# Patient Record
Sex: MALE | Race: WHITE | NOT HISPANIC OR LATINO | Employment: OTHER | URBAN - METROPOLITAN AREA
[De-identification: names, ages, dates, MRNs, and addresses within clinical notes are randomized per-mention and may not be internally consistent; named-entity substitution may affect disease eponyms.]

---

## 2023-01-01 ENCOUNTER — HOSPITAL ENCOUNTER (OUTPATIENT)
Dept: CARDIOLOGY | Facility: HOSPITAL | Age: 83
Discharge: HOME | End: 2023-12-05

## 2023-01-01 ENCOUNTER — APPOINTMENT (OUTPATIENT)
Dept: RADIOLOGY | Facility: HOSPITAL | Age: 83
DRG: 871 | End: 2023-01-01
Payer: MEDICARE

## 2023-01-01 ENCOUNTER — HOSPITAL ENCOUNTER (INPATIENT)
Facility: HOSPITAL | Age: 83
LOS: 1 days | Discharge: ANOTHER HOSPITAL | DRG: 871 | End: 2023-12-05
Attending: STUDENT IN AN ORGANIZED HEALTH CARE EDUCATION/TRAINING PROGRAM | Admitting: STUDENT IN AN ORGANIZED HEALTH CARE EDUCATION/TRAINING PROGRAM
Payer: MEDICARE

## 2023-01-01 ENCOUNTER — HOSPITAL ENCOUNTER (INPATIENT)
Facility: HOSPITAL | Age: 83
LOS: 7 days | DRG: 871 | End: 2023-12-12
Attending: INTERNAL MEDICINE | Admitting: INTERNAL MEDICINE
Payer: MEDICARE

## 2023-01-01 ENCOUNTER — APPOINTMENT (OUTPATIENT)
Dept: CARDIOLOGY | Facility: HOSPITAL | Age: 83
DRG: 871 | End: 2023-01-01
Payer: MEDICARE

## 2023-01-01 ENCOUNTER — OFFICE VISIT (OUTPATIENT)
Dept: PRIMARY CARE | Facility: CLINIC | Age: 83
End: 2023-01-01

## 2023-01-01 ENCOUNTER — TELEPHONE (OUTPATIENT)
Dept: PRIMARY CARE | Facility: CLINIC | Age: 83
End: 2023-01-01

## 2023-01-01 ENCOUNTER — HOSPITAL ENCOUNTER (OUTPATIENT)
Dept: CARDIOLOGY | Facility: HOSPITAL | Age: 83
Discharge: HOME | End: 2023-12-06

## 2023-01-01 VITALS
HEIGHT: 74 IN | OXYGEN SATURATION: 100 % | RESPIRATION RATE: 20 BRPM | SYSTOLIC BLOOD PRESSURE: 102 MMHG | BODY MASS INDEX: 15.67 KG/M2 | DIASTOLIC BLOOD PRESSURE: 56 MMHG | TEMPERATURE: 97.2 F | HEART RATE: 78 BPM | WEIGHT: 122.14 LBS

## 2023-01-01 VITALS
TEMPERATURE: 97.5 F | DIASTOLIC BLOOD PRESSURE: 56 MMHG | SYSTOLIC BLOOD PRESSURE: 106 MMHG | WEIGHT: 138.45 LBS | BODY MASS INDEX: 17.77 KG/M2 | OXYGEN SATURATION: 91 % | HEIGHT: 74 IN | HEART RATE: 101 BPM | RESPIRATION RATE: 15 BRPM

## 2023-01-01 VITALS
HEIGHT: 72 IN | SYSTOLIC BLOOD PRESSURE: 104 MMHG | OXYGEN SATURATION: 93 % | HEART RATE: 66 BPM | BODY MASS INDEX: 19.26 KG/M2 | TEMPERATURE: 97.7 F | RESPIRATION RATE: 16 BRPM | DIASTOLIC BLOOD PRESSURE: 56 MMHG

## 2023-01-01 DIAGNOSIS — J96.01 ACUTE HYPOXEMIC RESPIRATORY FAILURE (MULTI): Primary | ICD-10-CM

## 2023-01-01 DIAGNOSIS — I95.9 HYPOTENSION, UNSPECIFIED HYPOTENSION TYPE: ICD-10-CM

## 2023-01-01 DIAGNOSIS — R09.02 HYPOXIA: ICD-10-CM

## 2023-01-01 DIAGNOSIS — J43.9 PULMONARY EMPHYSEMA, UNSPECIFIED EMPHYSEMA TYPE (MULTI): Primary | ICD-10-CM

## 2023-01-01 DIAGNOSIS — J18.9 PNEUMONIA OF LEFT LUNG DUE TO INFECTIOUS ORGANISM, UNSPECIFIED PART OF LUNG: Primary | ICD-10-CM

## 2023-01-01 DIAGNOSIS — A41.9 SEPSIS, DUE TO UNSPECIFIED ORGANISM, UNSPECIFIED WHETHER ACUTE ORGAN DYSFUNCTION PRESENT (MULTI): ICD-10-CM

## 2023-01-01 DIAGNOSIS — R63.4 WEIGHT LOSS: ICD-10-CM

## 2023-01-01 DIAGNOSIS — R06.02 SHORTNESS OF BREATH: ICD-10-CM

## 2023-01-01 LAB
ABO GROUP (TYPE) IN BLOOD: NORMAL
ALBUMIN MFR UR ELPH: 20.6 %
ALBUMIN SERPL BCP-MCNC: 2.1 G/DL (ref 3.4–5)
ALBUMIN SERPL BCP-MCNC: 2.1 G/DL (ref 3.4–5)
ALBUMIN SERPL BCP-MCNC: 2.2 G/DL (ref 3.4–5)
ALBUMIN SERPL BCP-MCNC: 2.3 G/DL (ref 3.4–5)
ALBUMIN SERPL BCP-MCNC: 2.3 G/DL (ref 3.4–5)
ALBUMIN SERPL BCP-MCNC: 2.5 G/DL (ref 3.4–5)
ALBUMIN SERPL BCP-MCNC: 3.1 G/DL (ref 3.4–5)
ALBUMIN SERPL BCP-MCNC: 3.2 G/DL (ref 3.4–5)
ALBUMIN SERPL BCP-MCNC: 3.2 G/DL (ref 3.4–5)
ALBUMIN: 2 G/DL (ref 3.4–5)
ALP SERPL-CCNC: 68 U/L (ref 33–136)
ALP SERPL-CCNC: 77 U/L (ref 33–136)
ALP SERPL-CCNC: 80 U/L (ref 33–136)
ALP SERPL-CCNC: 91 U/L (ref 33–136)
ALPHA 1 GLOBULIN: 0.6 G/DL (ref 0.2–0.6)
ALPHA 2 GLOBULIN: 0.8 G/DL (ref 0.4–1.1)
ALPHA1 GLOB MFR UR ELPH: 18.4 %
ALPHA2 GLOB MFR UR ELPH: 10.4 %
ALT SERPL W P-5'-P-CCNC: 6 U/L (ref 10–52)
ALT SERPL W P-5'-P-CCNC: 7 U/L (ref 10–52)
AMMONIA PLAS-SCNC: 78 UMOL/L (ref 16–53)
ANION GAP BLDA CALCULATED.4IONS-SCNC: 10 MMO/L (ref 10–25)
ANION GAP BLDA CALCULATED.4IONS-SCNC: 10 MMO/L (ref 10–25)
ANION GAP BLDA CALCULATED.4IONS-SCNC: 12 MMO/L (ref 10–25)
ANION GAP BLDA CALCULATED.4IONS-SCNC: 5 MMO/L (ref 10–25)
ANION GAP BLDA CALCULATED.4IONS-SCNC: 5 MMO/L (ref 10–25)
ANION GAP BLDA CALCULATED.4IONS-SCNC: 6 MMO/L (ref 10–25)
ANION GAP BLDA CALCULATED.4IONS-SCNC: 6 MMO/L (ref 10–25)
ANION GAP BLDA CALCULATED.4IONS-SCNC: 7 MMO/L (ref 10–25)
ANION GAP BLDA CALCULATED.4IONS-SCNC: 8 MMO/L (ref 10–25)
ANION GAP BLDV CALCULATED.4IONS-SCNC: 10 MMOL/L (ref 10–25)
ANION GAP SERPL CALC-SCNC: 10 MMOL/L (ref 10–20)
ANION GAP SERPL CALC-SCNC: 13 MMOL/L (ref 10–20)
ANION GAP SERPL CALC-SCNC: 13 MMOL/L (ref 10–20)
ANION GAP SERPL CALC-SCNC: 15 MMOL/L (ref 10–20)
ANION GAP SERPL CALC-SCNC: 16 MMOL/L (ref 10–20)
ANTIBODY SCREEN: NORMAL
AORTIC VALVE MEAN GRADIENT: 11
AORTIC VALVE PEAK VELOCITY: 2.48
APPARATUS: ABNORMAL
APPEARANCE UR: ABNORMAL
APTT PPP: 32 SECONDS (ref 27–38)
ARTERIAL PATENCY WRIST A: NEGATIVE
AST SERPL W P-5'-P-CCNC: 13 U/L (ref 9–39)
AST SERPL W P-5'-P-CCNC: 14 U/L (ref 9–39)
AST SERPL W P-5'-P-CCNC: 15 U/L (ref 9–39)
AST SERPL W P-5'-P-CCNC: 17 U/L (ref 9–39)
AV PEAK GRADIENT: 24.6
AVA (PEAK VEL): 1.2
AVA (VTI): 1.33
B-GLOBULIN MFR UR ELPH: 19.2 %
BACTERIA #/AREA URNS AUTO: ABNORMAL /HPF
BACTERIA BLD CULT: NORMAL
BACTERIA BLD CULT: NORMAL
BACTERIA SPEC RESP CULT: ABNORMAL
BACTERIA UR CULT: ABNORMAL
BACTERIA UR CULT: NORMAL
BASE EXCESS BLDA CALC-SCNC: 1.2 MMOL/L (ref -2–3)
BASE EXCESS BLDA CALC-SCNC: 1.8 MMOL/L (ref -2–3)
BASE EXCESS BLDA CALC-SCNC: 1.9 MMOL/L (ref -2–3)
BASE EXCESS BLDA CALC-SCNC: 3 MMOL/L (ref -2–3)
BASE EXCESS BLDA CALC-SCNC: 3.4 MMOL/L (ref -2–3)
BASE EXCESS BLDA CALC-SCNC: 4.3 MMOL/L (ref -2–3)
BASE EXCESS BLDA CALC-SCNC: 4.8 MMOL/L (ref -2–3)
BASE EXCESS BLDA CALC-SCNC: 4.9 MMOL/L (ref -2–3)
BASE EXCESS BLDA CALC-SCNC: 4.9 MMOL/L (ref -2–3)
BASE EXCESS BLDA CALC-SCNC: 5.6 MMOL/L (ref -2–3)
BASE EXCESS BLDA CALC-SCNC: 5.9 MMOL/L (ref -2–3)
BASE EXCESS BLDA CALC-SCNC: 6.3 MMOL/L (ref -2–3)
BASE EXCESS BLDA CALC-SCNC: 8.5 MMOL/L (ref -2–3)
BASE EXCESS BLDA CALC-SCNC: 9.7 MMOL/L (ref -2–3)
BASE EXCESS BLDV CALC-SCNC: 0.8 MMOL/L (ref -2–3)
BASOPHILS # BLD AUTO: 0.01 X10*3/UL (ref 0–0.1)
BASOPHILS # BLD AUTO: 0.02 X10*3/UL (ref 0–0.1)
BASOPHILS # BLD AUTO: 0.03 X10*3/UL (ref 0–0.1)
BASOPHILS # BLD AUTO: 0.04 X10*3/UL (ref 0–0.1)
BASOPHILS # BLD AUTO: 0.11 X10*3/UL (ref 0–0.1)
BASOPHILS NFR BLD AUTO: 0.1 %
BASOPHILS NFR BLD AUTO: 0.3 %
BETA GLOBULIN: 0.7 G/DL (ref 0.5–1.2)
BILIRUB SERPL-MCNC: 0.7 MG/DL (ref 0–1.2)
BILIRUB SERPL-MCNC: 0.8 MG/DL (ref 0–1.2)
BILIRUB SERPL-MCNC: 1.7 MG/DL (ref 0–1.2)
BILIRUB SERPL-MCNC: 1.7 MG/DL (ref 0–1.2)
BILIRUB UR STRIP.AUTO-MCNC: NEGATIVE MG/DL
BNP SERPL-MCNC: 365 PG/ML (ref 0–99)
BODY TEMPERATURE: 37 DEGREES CELSIUS
BODY TEMPERATURE: ABNORMAL
BUN SERPL-MCNC: 27 MG/DL (ref 6–23)
BUN SERPL-MCNC: 28 MG/DL (ref 6–23)
BUN SERPL-MCNC: 29 MG/DL (ref 6–23)
BUN SERPL-MCNC: 29 MG/DL (ref 6–23)
BUN SERPL-MCNC: 34 MG/DL (ref 6–23)
BUN SERPL-MCNC: 34 MG/DL (ref 6–23)
BUN SERPL-MCNC: 35 MG/DL (ref 6–23)
BUN SERPL-MCNC: 37 MG/DL (ref 6–23)
BUN SERPL-MCNC: 37 MG/DL (ref 6–23)
CA-I BLD-SCNC: 1.08 MMOL/L (ref 1.1–1.33)
CA-I BLDA-SCNC: 1.07 MMOL/L (ref 1.1–1.33)
CA-I BLDA-SCNC: 1.07 MMOL/L (ref 1.1–1.33)
CA-I BLDA-SCNC: 1.1 MMOL/L (ref 1.1–1.33)
CA-I BLDA-SCNC: 1.12 MMOL/L (ref 1.1–1.33)
CA-I BLDA-SCNC: 1.12 MMOL/L (ref 1.1–1.33)
CA-I BLDA-SCNC: 1.14 MMOL/L (ref 1.1–1.33)
CA-I BLDA-SCNC: 1.15 MMOL/L (ref 1.1–1.33)
CA-I BLDA-SCNC: 1.16 MMOL/L (ref 1.1–1.33)
CA-I BLDA-SCNC: 1.16 MMOL/L (ref 1.1–1.33)
CA-I BLDA-SCNC: 1.17 MMOL/L (ref 1.1–1.33)
CA-I BLDA-SCNC: 1.2 MMOL/L (ref 1.1–1.33)
CA-I BLDV-SCNC: 1.08 MMOL/L (ref 1.1–1.33)
CALCIUM SERPL-MCNC: 8.3 MG/DL (ref 8.6–10.6)
CALCIUM SERPL-MCNC: 8.3 MG/DL (ref 8.6–10.6)
CALCIUM SERPL-MCNC: 8.4 MG/DL (ref 8.6–10.6)
CALCIUM SERPL-MCNC: 8.5 MG/DL (ref 8.6–10.6)
CALCIUM SERPL-MCNC: 8.8 MG/DL (ref 8.6–10.3)
CALCIUM SERPL-MCNC: 9.2 MG/DL (ref 8.6–10.3)
CALCIUM SERPL-MCNC: 9.2 MG/DL (ref 8.6–10.3)
CARDIAC TROPONIN I PNL SERPL HS: 21 NG/L (ref 0–20)
CARDIAC TROPONIN I PNL SERPL HS: 27 NG/L (ref 0–20)
CARDIAC TROPONIN I PNL SERPL HS: 29 NG/L (ref 0–53)
CARDIAC TROPONIN I PNL SERPL HS: 30 NG/L (ref 0–53)
CARDIAC TROPONIN I PNL SERPL HS: 31 NG/L (ref 0–53)
CARDIAC TROPONIN I PNL SERPL HS: 32 NG/L (ref 0–20)
CHLORIDE BLDA-SCNC: 100 MMOL/L (ref 98–107)
CHLORIDE BLDA-SCNC: 100 MMOL/L (ref 98–107)
CHLORIDE BLDA-SCNC: 101 MMOL/L (ref 98–107)
CHLORIDE BLDA-SCNC: 101 MMOL/L (ref 98–107)
CHLORIDE BLDA-SCNC: 102 MMOL/L (ref 98–107)
CHLORIDE BLDA-SCNC: 107 MMOL/L (ref 98–107)
CHLORIDE BLDA-SCNC: 108 MMOL/L (ref 98–107)
CHLORIDE BLDA-SCNC: 109 MMOL/L (ref 98–107)
CHLORIDE BLDA-SCNC: 113 MMOL/L (ref 98–107)
CHLORIDE BLDA-SCNC: 97 MMOL/L (ref 98–107)
CHLORIDE BLDA-SCNC: 98 MMOL/L (ref 98–107)
CHLORIDE BLDV-SCNC: 107 MMOL/L (ref 98–107)
CHLORIDE SERPL-SCNC: 102 MMOL/L (ref 98–107)
CHLORIDE SERPL-SCNC: 104 MMOL/L (ref 98–107)
CHLORIDE SERPL-SCNC: 105 MMOL/L (ref 98–107)
CHLORIDE SERPL-SCNC: 105 MMOL/L (ref 98–107)
CHLORIDE SERPL-SCNC: 107 MMOL/L (ref 98–107)
CHLORIDE SERPL-SCNC: 95 MMOL/L (ref 98–107)
CHLORIDE SERPL-SCNC: 95 MMOL/L (ref 98–107)
CHLORIDE SERPL-SCNC: 97 MMOL/L (ref 98–107)
CHLORIDE SERPL-SCNC: 99 MMOL/L (ref 98–107)
CK SERPL-CCNC: 64 U/L (ref 0–325)
CO2 SERPL-SCNC: 26 MMOL/L (ref 21–32)
CO2 SERPL-SCNC: 26 MMOL/L (ref 21–32)
CO2 SERPL-SCNC: 28 MMOL/L (ref 21–32)
CO2 SERPL-SCNC: 30 MMOL/L (ref 21–32)
CO2 SERPL-SCNC: 34 MMOL/L (ref 21–32)
CO2 SERPL-SCNC: 35 MMOL/L (ref 21–32)
CO2 SERPL-SCNC: 36 MMOL/L (ref 21–32)
COLOR UR: ABNORMAL
CREAT SERPL-MCNC: 1.08 MG/DL (ref 0.5–1.3)
CREAT SERPL-MCNC: 1.16 MG/DL (ref 0.5–1.3)
CREAT SERPL-MCNC: 1.29 MG/DL (ref 0.5–1.3)
CREAT SERPL-MCNC: 1.4 MG/DL (ref 0.5–1.3)
CREAT SERPL-MCNC: 1.4 MG/DL (ref 0.5–1.3)
CREAT SERPL-MCNC: 1.41 MG/DL (ref 0.5–1.3)
CREAT SERPL-MCNC: 1.42 MG/DL (ref 0.5–1.3)
CREAT SERPL-MCNC: 1.43 MG/DL (ref 0.5–1.3)
CREAT SERPL-MCNC: 1.72 MG/DL (ref 0.5–1.3)
CREAT UR-MCNC: 82.3 MG/DL (ref 20–370)
CRITICAL CALL TIME: 45
CRITICAL CALLED BY: ABNORMAL
CRITICAL CALLED TO: ABNORMAL
CRITICAL READ BACK: ABNORMAL
EJECTION FRACTION APICAL 4 CHAMBER: 63.1
EOSINOPHIL # BLD AUTO: 0 X10*3/UL (ref 0–0.4)
EOSINOPHIL # BLD AUTO: 0.01 X10*3/UL (ref 0–0.4)
EOSINOPHIL NFR BLD AUTO: 0 %
EOSINOPHIL NFR BLD AUTO: 0.1 %
ERYTHROCYTE [DISTWIDTH] IN BLOOD BY AUTOMATED COUNT: 13.6 % (ref 11.5–14.5)
ERYTHROCYTE [DISTWIDTH] IN BLOOD BY AUTOMATED COUNT: 13.8 % (ref 11.5–14.5)
ERYTHROCYTE [DISTWIDTH] IN BLOOD BY AUTOMATED COUNT: 13.9 % (ref 11.5–14.5)
ERYTHROCYTE [DISTWIDTH] IN BLOOD BY AUTOMATED COUNT: 14 % (ref 11.5–14.5)
EST. AVERAGE GLUCOSE BLD GHB EST-MCNC: 126 MG/DL
FLUAV RNA RESP QL NAA+PROBE: NOT DETECTED
FLUBV RNA RESP QL NAA+PROBE: NOT DETECTED
GAMMA GLOB MFR UR ELPH: 31.4 %
GAMMA GLOBULIN: 1.9 G/DL (ref 0.5–1.4)
GFR SERPL CREATININE-BSD FRML MDRD: 39 ML/MIN/1.73M*2
GFR SERPL CREATININE-BSD FRML MDRD: 48 ML/MIN/1.73M*2
GFR SERPL CREATININE-BSD FRML MDRD: 49 ML/MIN/1.73M*2
GFR SERPL CREATININE-BSD FRML MDRD: 49 ML/MIN/1.73M*2
GFR SERPL CREATININE-BSD FRML MDRD: 50 ML/MIN/1.73M*2
GFR SERPL CREATININE-BSD FRML MDRD: 50 ML/MIN/1.73M*2
GFR SERPL CREATININE-BSD FRML MDRD: 55 ML/MIN/1.73M*2
GFR SERPL CREATININE-BSD FRML MDRD: 62 ML/MIN/1.73M*2
GFR SERPL CREATININE-BSD FRML MDRD: 68 ML/MIN/1.73M*2
GLUCOSE BLD MANUAL STRIP-MCNC: 100 MG/DL (ref 74–99)
GLUCOSE BLD MANUAL STRIP-MCNC: 113 MG/DL (ref 74–99)
GLUCOSE BLD MANUAL STRIP-MCNC: 118 MG/DL (ref 74–99)
GLUCOSE BLD MANUAL STRIP-MCNC: 123 MG/DL (ref 74–99)
GLUCOSE BLD MANUAL STRIP-MCNC: 133 MG/DL (ref 74–99)
GLUCOSE BLD MANUAL STRIP-MCNC: 136 MG/DL (ref 74–99)
GLUCOSE BLD MANUAL STRIP-MCNC: 136 MG/DL (ref 74–99)
GLUCOSE BLD MANUAL STRIP-MCNC: 147 MG/DL (ref 74–99)
GLUCOSE BLD MANUAL STRIP-MCNC: 152 MG/DL (ref 74–99)
GLUCOSE BLD MANUAL STRIP-MCNC: 154 MG/DL (ref 74–99)
GLUCOSE BLD MANUAL STRIP-MCNC: 156 MG/DL (ref 74–99)
GLUCOSE BLD MANUAL STRIP-MCNC: 165 MG/DL (ref 74–99)
GLUCOSE BLD MANUAL STRIP-MCNC: 68 MG/DL (ref 74–99)
GLUCOSE BLD MANUAL STRIP-MCNC: 70 MG/DL (ref 74–99)
GLUCOSE BLD MANUAL STRIP-MCNC: 71 MG/DL (ref 74–99)
GLUCOSE BLD MANUAL STRIP-MCNC: 72 MG/DL (ref 74–99)
GLUCOSE BLD MANUAL STRIP-MCNC: 73 MG/DL (ref 74–99)
GLUCOSE BLD MANUAL STRIP-MCNC: 79 MG/DL (ref 74–99)
GLUCOSE BLD MANUAL STRIP-MCNC: 86 MG/DL (ref 74–99)
GLUCOSE BLD MANUAL STRIP-MCNC: 88 MG/DL (ref 74–99)
GLUCOSE BLD MANUAL STRIP-MCNC: 89 MG/DL (ref 74–99)
GLUCOSE BLD MANUAL STRIP-MCNC: 92 MG/DL (ref 74–99)
GLUCOSE BLD MANUAL STRIP-MCNC: 93 MG/DL (ref 74–99)
GLUCOSE BLD MANUAL STRIP-MCNC: 96 MG/DL (ref 74–99)
GLUCOSE BLD MANUAL STRIP-MCNC: 97 MG/DL (ref 74–99)
GLUCOSE BLDA-MCNC: 104 MG/DL (ref 74–99)
GLUCOSE BLDA-MCNC: 113 MG/DL (ref 74–99)
GLUCOSE BLDA-MCNC: 119 MG/DL (ref 74–99)
GLUCOSE BLDA-MCNC: 135 MG/DL (ref 74–99)
GLUCOSE BLDA-MCNC: 144 MG/DL (ref 74–99)
GLUCOSE BLDA-MCNC: 152 MG/DL (ref 74–99)
GLUCOSE BLDA-MCNC: 155 MG/DL (ref 74–99)
GLUCOSE BLDA-MCNC: 174 MG/DL (ref 74–99)
GLUCOSE BLDA-MCNC: 177 MG/DL (ref 74–99)
GLUCOSE BLDA-MCNC: 193 MG/DL (ref 74–99)
GLUCOSE BLDA-MCNC: 88 MG/DL (ref 74–99)
GLUCOSE BLDA-MCNC: 92 MG/DL (ref 74–99)
GLUCOSE BLDA-MCNC: 93 MG/DL (ref 74–99)
GLUCOSE BLDV-MCNC: 148 MG/DL (ref 74–99)
GLUCOSE SERPL-MCNC: 111 MG/DL (ref 74–99)
GLUCOSE SERPL-MCNC: 132 MG/DL (ref 74–99)
GLUCOSE SERPL-MCNC: 141 MG/DL (ref 74–99)
GLUCOSE SERPL-MCNC: 165 MG/DL (ref 74–99)
GLUCOSE SERPL-MCNC: 175 MG/DL (ref 74–99)
GLUCOSE SERPL-MCNC: 67 MG/DL (ref 74–99)
GLUCOSE SERPL-MCNC: 90 MG/DL (ref 74–99)
GLUCOSE SERPL-MCNC: 96 MG/DL (ref 74–99)
GLUCOSE SERPL-MCNC: 96 MG/DL (ref 74–99)
GLUCOSE UR STRIP.AUTO-MCNC: NEGATIVE MG/DL
GRAM STN SPEC: ABNORMAL
GRAM STN SPEC: ABNORMAL
HAPTOGLOB SERPL-MCNC: 265 MG/DL (ref 37–246)
HBA1C MFR BLD: 6 %
HCO3 BLDA-SCNC: 24.7 MMOL/L (ref 22–26)
HCO3 BLDA-SCNC: 26.3 MMOL/L (ref 22–26)
HCO3 BLDA-SCNC: 27.8 MMOL/L (ref 22–26)
HCO3 BLDA-SCNC: 28.2 MMOL/L (ref 22–26)
HCO3 BLDA-SCNC: 28.5 MMOL/L (ref 22–26)
HCO3 BLDA-SCNC: 29.4 MMOL/L (ref 22–26)
HCO3 BLDA-SCNC: 29.7 MMOL/L (ref 22–26)
HCO3 BLDA-SCNC: 29.7 MMOL/L (ref 22–26)
HCO3 BLDA-SCNC: 29.8 MMOL/L (ref 22–26)
HCO3 BLDA-SCNC: 31.6 MMOL/L (ref 22–26)
HCO3 BLDA-SCNC: 31.6 MMOL/L (ref 22–26)
HCO3 BLDA-SCNC: 31.8 MMOL/L (ref 22–26)
HCO3 BLDA-SCNC: 37.9 MMOL/L (ref 22–26)
HCO3 BLDA-SCNC: 38.4 MMOL/L (ref 22–26)
HCO3 BLDV-SCNC: 25.3 MMOL/L (ref 22–26)
HCT VFR BLD AUTO: 29.7 % (ref 41–52)
HCT VFR BLD AUTO: 30.4 % (ref 41–52)
HCT VFR BLD AUTO: 30.4 % (ref 41–52)
HCT VFR BLD AUTO: 32.3 % (ref 41–52)
HCT VFR BLD AUTO: 32.3 % (ref 41–52)
HCT VFR BLD AUTO: 35 % (ref 41–52)
HCT VFR BLD AUTO: 44.6 % (ref 41–52)
HCT VFR BLD AUTO: 45.6 % (ref 41–52)
HCT VFR BLD EST: 28 % (ref 41–52)
HCT VFR BLD EST: 32 % (ref 41–52)
HCT VFR BLD EST: 33 % (ref 41–52)
HCT VFR BLD EST: 34 % (ref 41–52)
HCT VFR BLD EST: 35 % (ref 41–52)
HCT VFR BLD EST: 36 % (ref 41–52)
HCT VFR BLD EST: 36 % (ref 41–52)
HCT VFR BLD EST: 41 % (ref 41–52)
HCT VFR BLD EST: 41 % (ref 41–52)
HGB BLD-MCNC: 10.3 G/DL (ref 13.5–17.5)
HGB BLD-MCNC: 10.5 G/DL (ref 13.5–17.5)
HGB BLD-MCNC: 11.1 G/DL (ref 13.5–17.5)
HGB BLD-MCNC: 13.7 G/DL (ref 13.5–17.5)
HGB BLD-MCNC: 14.3 G/DL (ref 13.5–17.5)
HGB BLD-MCNC: 9.5 G/DL (ref 13.5–17.5)
HGB BLDA-MCNC: 10.5 G/DL (ref 13.5–17.5)
HGB BLDA-MCNC: 10.8 G/DL (ref 13.5–17.5)
HGB BLDA-MCNC: 10.8 G/DL (ref 13.5–17.5)
HGB BLDA-MCNC: 11.1 G/DL (ref 13.5–17.5)
HGB BLDA-MCNC: 11.3 G/DL (ref 13.5–17.5)
HGB BLDA-MCNC: 11.5 G/DL (ref 13.5–17.5)
HGB BLDA-MCNC: 11.6 G/DL (ref 13.5–17.5)
HGB BLDA-MCNC: 11.7 G/DL (ref 13.5–17.5)
HGB BLDA-MCNC: 11.7 G/DL (ref 13.5–17.5)
HGB BLDA-MCNC: 12.1 G/DL (ref 13.5–17.5)
HGB BLDA-MCNC: 13.6 G/DL (ref 13.5–17.5)
HGB BLDA-MCNC: 13.8 G/DL (ref 13.5–17.5)
HGB BLDA-MCNC: 9.3 G/DL (ref 13.5–17.5)
HGB BLDV-MCNC: 12 G/DL (ref 13.5–17.5)
HGB RETIC QN: 32 PG (ref 28–38)
HOLD SPECIMEN: NORMAL
IMM GRANULOCYTES # BLD AUTO: 0.09 X10*3/UL (ref 0–0.5)
IMM GRANULOCYTES # BLD AUTO: 0.1 X10*3/UL (ref 0–0.5)
IMM GRANULOCYTES # BLD AUTO: 0.12 X10*3/UL (ref 0–0.5)
IMM GRANULOCYTES # BLD AUTO: 0.18 X10*3/UL (ref 0–0.5)
IMM GRANULOCYTES # BLD AUTO: 0.19 X10*3/UL (ref 0–0.5)
IMM GRANULOCYTES # BLD AUTO: 0.27 X10*3/UL (ref 0–0.5)
IMM GRANULOCYTES # BLD AUTO: 0.44 X10*3/UL (ref 0–0.5)
IMM GRANULOCYTES NFR BLD AUTO: 0.6 % (ref 0–0.9)
IMM GRANULOCYTES NFR BLD AUTO: 0.7 % (ref 0–0.9)
IMM GRANULOCYTES NFR BLD AUTO: 0.9 % (ref 0–0.9)
IMM GRANULOCYTES NFR BLD AUTO: 0.9 % (ref 0–0.9)
IMM GRANULOCYTES NFR BLD AUTO: 1.1 % (ref 0–0.9)
IMMATURE RETIC FRACTION: 11.3 %
IMMUNOFIXATION COMMENT: ABNORMAL
IMMUNOFIXATION COMMENT: NORMAL
INHALED O2 CONCENTRATION: 100 %
INHALED O2 CONCENTRATION: 15 %
INHALED O2 CONCENTRATION: 30 %
INHALED O2 CONCENTRATION: 40 %
INHALED O2 CONCENTRATION: 50 %
INHALED O2 CONCENTRATION: 90 %
INR PPP: 1.8 (ref 0.9–1.1)
KETONES UR STRIP.AUTO-MCNC: ABNORMAL MG/DL
LACTATE BLDA-SCNC: 0.9 MMOL/L (ref 0.4–2)
LACTATE BLDA-SCNC: 0.9 MMOL/L (ref 0.4–2)
LACTATE BLDA-SCNC: 1 MMOL/L (ref 0.4–2)
LACTATE BLDA-SCNC: 1.2 MMOL/L (ref 0.4–2)
LACTATE BLDA-SCNC: 1.4 MMOL/L (ref 0.4–2)
LACTATE BLDA-SCNC: 1.6 MMOL/L (ref 0.4–2)
LACTATE BLDA-SCNC: 1.6 MMOL/L (ref 0.4–2)
LACTATE BLDA-SCNC: 1.7 MMOL/L (ref 0.4–2)
LACTATE BLDA-SCNC: 1.9 MMOL/L (ref 0.4–2)
LACTATE BLDA-SCNC: 2 MMOL/L (ref 0.4–2)
LACTATE BLDA-SCNC: 2.1 MMOL/L (ref 0.4–2)
LACTATE BLDA-SCNC: 2.3 MMOL/L (ref 0.4–2)
LACTATE BLDA-SCNC: 2.4 MMOL/L (ref 0.4–2)
LACTATE BLDV-SCNC: 1.1 MMOL/L (ref 0.4–2)
LACTATE BLDV-SCNC: 2.1 MMOL/L (ref 0.4–2)
LACTATE SERPL-SCNC: 1.6 MMOL/L (ref 0.4–2)
LACTATE SERPL-SCNC: 1.6 MMOL/L (ref 0.4–2)
LACTATE SERPL-SCNC: 1.9 MMOL/L (ref 0.4–2)
LACTATE SERPL-SCNC: 2.1 MMOL/L (ref 0.4–2)
LACTATE SERPL-SCNC: 2.1 MMOL/L (ref 0.4–2)
LACTATE SERPL-SCNC: 2.7 MMOL/L (ref 0.4–2)
LDH SERPL L TO P-CCNC: 249 U/L (ref 84–246)
LEFT VENTRICLE INTERNAL DIMENSION DIASTOLE: 3.09 (ref 3.5–6)
LEFT VENTRICULAR OUTFLOW TRACT DIAMETER: 2
LEGIONELLA AG UR QL: NEGATIVE
LEUKOCYTE ESTERASE UR QL STRIP.AUTO: ABNORMAL
LYMPHOCYTES # BLD AUTO: 0.41 X10*3/UL (ref 0.8–3)
LYMPHOCYTES # BLD AUTO: 0.57 X10*3/UL (ref 0.8–3)
LYMPHOCYTES # BLD AUTO: 0.59 X10*3/UL (ref 0.8–3)
LYMPHOCYTES # BLD AUTO: 0.75 X10*3/UL (ref 0.8–3)
LYMPHOCYTES # BLD AUTO: 0.85 X10*3/UL (ref 0.8–3)
LYMPHOCYTES # BLD AUTO: 0.88 X10*3/UL (ref 0.8–3)
LYMPHOCYTES # BLD AUTO: 0.95 X10*3/UL (ref 0.8–3)
LYMPHOCYTES NFR BLD AUTO: 1.7 %
LYMPHOCYTES NFR BLD AUTO: 2.2 %
LYMPHOCYTES NFR BLD AUTO: 3.2 %
LYMPHOCYTES NFR BLD AUTO: 3.5 %
LYMPHOCYTES NFR BLD AUTO: 3.5 %
LYMPHOCYTES NFR BLD AUTO: 3.7 %
LYMPHOCYTES NFR BLD AUTO: 6.7 %
M-PROTEIN 1 URINE %: 9 %
M-PROTEIN 1: 0.3 G/DL
MAGNESIUM SERPL-MCNC: 1.76 MG/DL (ref 1.6–2.4)
MAGNESIUM SERPL-MCNC: 1.84 MG/DL (ref 1.6–2.4)
MAGNESIUM SERPL-MCNC: 2.1 MG/DL (ref 1.6–2.4)
MAGNESIUM SERPL-MCNC: 2.14 MG/DL (ref 1.6–2.4)
MAGNESIUM SERPL-MCNC: 2.23 MG/DL (ref 1.6–2.4)
MAGNESIUM SERPL-MCNC: 2.32 MG/DL (ref 1.6–2.4)
MCH RBC QN AUTO: 29 PG (ref 26–34)
MCH RBC QN AUTO: 29.7 PG (ref 26–34)
MCH RBC QN AUTO: 30 PG (ref 26–34)
MCH RBC QN AUTO: 30.1 PG (ref 26–34)
MCH RBC QN AUTO: 30.1 PG (ref 26–34)
MCH RBC QN AUTO: 30.3 PG (ref 26–34)
MCH RBC QN AUTO: 30.7 PG (ref 26–34)
MCH RBC QN AUTO: 30.8 PG (ref 26–34)
MCHC RBC AUTO-ENTMCNC: 30 G/DL (ref 32–36)
MCHC RBC AUTO-ENTMCNC: 30 G/DL (ref 32–36)
MCHC RBC AUTO-ENTMCNC: 31.3 G/DL (ref 32–36)
MCHC RBC AUTO-ENTMCNC: 31.3 G/DL (ref 32–36)
MCHC RBC AUTO-ENTMCNC: 31.9 G/DL (ref 32–36)
MCHC RBC AUTO-ENTMCNC: 32 G/DL (ref 32–36)
MCHC RBC AUTO-ENTMCNC: 32.1 G/DL (ref 32–36)
MCHC RBC AUTO-ENTMCNC: 34.4 G/DL (ref 32–36)
MCV RBC AUTO: 101 FL (ref 80–100)
MCV RBC AUTO: 90 FL (ref 80–100)
MCV RBC AUTO: 93 FL (ref 80–100)
MCV RBC AUTO: 94 FL (ref 80–100)
MCV RBC AUTO: 96 FL (ref 80–100)
MCV RBC AUTO: 97 FL (ref 80–100)
MITRAL VALVE E/A RATIO: 1.12
MITRAL VALVE E/E' RATIO: 17.5
MONOCYTES # BLD AUTO: 0.79 X10*3/UL (ref 0.05–0.8)
MONOCYTES # BLD AUTO: 0.83 X10*3/UL (ref 0.05–0.8)
MONOCYTES # BLD AUTO: 0.85 X10*3/UL (ref 0.05–0.8)
MONOCYTES # BLD AUTO: 0.89 X10*3/UL (ref 0.05–0.8)
MONOCYTES # BLD AUTO: 1.15 X10*3/UL (ref 0.05–0.8)
MONOCYTES # BLD AUTO: 1.73 X10*3/UL (ref 0.05–0.8)
MONOCYTES # BLD AUTO: 2.15 X10*3/UL (ref 0.05–0.8)
MONOCYTES NFR BLD AUTO: 3.4 %
MONOCYTES NFR BLD AUTO: 3.7 %
MONOCYTES NFR BLD AUTO: 5 %
MONOCYTES NFR BLD AUTO: 5.5 %
MONOCYTES NFR BLD AUTO: 5.7 %
MONOCYTES NFR BLD AUTO: 5.9 %
MONOCYTES NFR BLD AUTO: 8.7 %
MRSA DNA SPEC QL NAA+PROBE: NOT DETECTED
MUCOUS THREADS #/AREA URNS AUTO: ABNORMAL /LPF
NEUTROPHILS # BLD AUTO: 11.09 X10*3/UL (ref 1.6–5.5)
NEUTROPHILS # BLD AUTO: 13.97 X10*3/UL (ref 1.6–5.5)
NEUTROPHILS # BLD AUTO: 15.08 X10*3/UL (ref 1.6–5.5)
NEUTROPHILS # BLD AUTO: 19.38 X10*3/UL (ref 1.6–5.5)
NEUTROPHILS # BLD AUTO: 23.2 X10*3/UL (ref 1.6–5.5)
NEUTROPHILS # BLD AUTO: 26.32 X10*3/UL (ref 1.6–5.5)
NEUTROPHILS # BLD AUTO: 35.36 X10*3/UL (ref 1.6–5.5)
NEUTROPHILS NFR BLD AUTO: 83.8 %
NEUTROPHILS NFR BLD AUTO: 89.8 %
NEUTROPHILS NFR BLD AUTO: 89.9 %
NEUTROPHILS NFR BLD AUTO: 90.7 %
NEUTROPHILS NFR BLD AUTO: 90.9 %
NEUTROPHILS NFR BLD AUTO: 91.8 %
NEUTROPHILS NFR BLD AUTO: 94.1 %
NITRITE UR QL STRIP.AUTO: NEGATIVE
NRBC BLD-RTO: 0 /100 WBCS (ref 0–0)
OXYHGB MFR BLDA: 83.1 % (ref 94–98)
OXYHGB MFR BLDA: 88.4 % (ref 94–98)
OXYHGB MFR BLDA: 95.9 % (ref 94–98)
OXYHGB MFR BLDA: 96.2 % (ref 94–98)
OXYHGB MFR BLDA: 96.5 % (ref 94–98)
OXYHGB MFR BLDA: 97.1 % (ref 94–98)
OXYHGB MFR BLDA: 97.4 % (ref 94–98)
OXYHGB MFR BLDA: 97.5 % (ref 94–98)
OXYHGB MFR BLDA: 97.7 % (ref 94–98)
OXYHGB MFR BLDA: 97.9 % (ref 94–98)
OXYHGB MFR BLDA: 98 % (ref 94–98)
OXYHGB MFR BLDA: 98 % (ref 94–98)
OXYHGB MFR BLDA: 98.2 % (ref 94–98)
OXYHGB MFR BLDA: 98.5 % (ref 94–98)
OXYHGB MFR BLDV: 96.6 % (ref 45–75)
PATH REVIEW - SERUM IMMUNOFIXATION: ABNORMAL
PATH REVIEW - URINE IMMUNOFIXATION: NORMAL
PATH REVIEW-SERUM PROTEIN ELECTROPHORESIS: ABNORMAL
PATH REVIEW-URINE PROTEIN ELECTROPHORESIS: ABNORMAL
PCO2 BLDA: 118 MM HG (ref 38–42)
PCO2 BLDA: 31 MM HG (ref 38–42)
PCO2 BLDA: 33 MM HG (ref 38–42)
PCO2 BLDA: 34 MM HG (ref 38–42)
PCO2 BLDA: 36 MM HG (ref 38–42)
PCO2 BLDA: 38 MM HG (ref 38–42)
PCO2 BLDA: 39 MM HG (ref 38–42)
PCO2 BLDA: 47 MM HG (ref 38–42)
PCO2 BLDA: 51 MM HG (ref 38–42)
PCO2 BLDA: 51 MM HG (ref 38–42)
PCO2 BLDA: 55 MM HG (ref 38–42)
PCO2 BLDA: 56 MM HG (ref 38–42)
PCO2 BLDA: 58 MM HG (ref 38–42)
PCO2 BLDA: 67 MM HG (ref 38–42)
PCO2 BLDV: 39 MM HG (ref 41–51)
PEEP CMH2O: 7 CM H2O
PH BLDA: 7.12 PH (ref 7.38–7.42)
PH BLDA: 7.3 PH (ref 7.38–7.42)
PH BLDA: 7.34 PH (ref 7.38–7.42)
PH BLDA: 7.35 PH (ref 7.38–7.42)
PH BLDA: 7.36 PH (ref 7.38–7.42)
PH BLDA: 7.36 PH (ref 7.38–7.42)
PH BLDA: 7.4 PH (ref 7.38–7.42)
PH BLDA: 7.41 PH (ref 7.38–7.42)
PH BLDA: 7.49 PH (ref 7.38–7.42)
PH BLDA: 7.51 PH (ref 7.38–7.42)
PH BLDA: 7.51 PH (ref 7.38–7.42)
PH BLDA: 7.52 PH (ref 7.38–7.42)
PH BLDA: 7.52 PH (ref 7.38–7.42)
PH BLDA: 7.53 PH (ref 7.38–7.42)
PH BLDV: 7.42 PH (ref 7.33–7.43)
PH UR STRIP.AUTO: 5 [PH]
PHOSPHATE SERPL-MCNC: 2.3 MG/DL (ref 2.5–4.9)
PHOSPHATE SERPL-MCNC: 3.2 MG/DL (ref 2.5–4.9)
PHOSPHATE SERPL-MCNC: 3.3 MG/DL (ref 2.5–4.9)
PHOSPHATE SERPL-MCNC: 3.4 MG/DL (ref 2.5–4.9)
PHOSPHATE SERPL-MCNC: 3.5 MG/DL (ref 2.5–4.9)
PHOSPHATE SERPL-MCNC: 8.3 MG/DL (ref 2.5–4.9)
PLATELET # BLD AUTO: 176 X10*3/UL (ref 150–450)
PLATELET # BLD AUTO: 180 X10*3/UL (ref 150–450)
PLATELET # BLD AUTO: 180 X10*3/UL (ref 150–450)
PLATELET # BLD AUTO: 208 X10*3/UL (ref 150–450)
PLATELET # BLD AUTO: 242 X10*3/UL (ref 150–450)
PLATELET # BLD AUTO: 246 X10*3/UL (ref 150–450)
PLATELET # BLD AUTO: 283 X10*3/UL (ref 150–450)
PLATELET # BLD AUTO: 292 X10*3/UL (ref 150–450)
PO2 BLDA: 103 MM HG (ref 85–95)
PO2 BLDA: 115 MM HG (ref 85–95)
PO2 BLDA: 118 MM HG (ref 85–95)
PO2 BLDA: 122 MM HG (ref 85–95)
PO2 BLDA: 143 MM HG (ref 85–95)
PO2 BLDA: 146 MM HG (ref 85–95)
PO2 BLDA: 163 MM HG (ref 85–95)
PO2 BLDA: 185 MM HG (ref 85–95)
PO2 BLDA: 303 MM HG (ref 85–95)
PO2 BLDA: 387 MM HG (ref 85–95)
PO2 BLDA: 60 MM HG (ref 85–95)
PO2 BLDA: 65 MM HG (ref 85–95)
PO2 BLDA: 92 MM HG (ref 85–95)
PO2 BLDA: 93 MM HG (ref 85–95)
PO2 BLDV: 94 MM HG (ref 35–45)
POTASSIUM BLDA-SCNC: 2.9 MMOL/L (ref 3.5–5.3)
POTASSIUM BLDA-SCNC: 3 MMOL/L (ref 3.5–5.3)
POTASSIUM BLDA-SCNC: 3.1 MMOL/L (ref 3.5–5.3)
POTASSIUM BLDA-SCNC: 3.4 MMOL/L (ref 3.5–5.3)
POTASSIUM BLDA-SCNC: 3.4 MMOL/L (ref 3.5–5.3)
POTASSIUM BLDA-SCNC: 3.5 MMOL/L (ref 3.5–5.3)
POTASSIUM BLDA-SCNC: 3.5 MMOL/L (ref 3.5–5.3)
POTASSIUM BLDA-SCNC: 3.6 MMOL/L (ref 3.5–5.3)
POTASSIUM BLDA-SCNC: 3.7 MMOL/L (ref 3.5–5.3)
POTASSIUM BLDA-SCNC: 3.9 MMOL/L (ref 3.5–5.3)
POTASSIUM BLDA-SCNC: 4 MMOL/L (ref 3.5–5.3)
POTASSIUM BLDA-SCNC: 4.2 MMOL/L (ref 3.5–5.3)
POTASSIUM BLDA-SCNC: 4.6 MMOL/L (ref 3.5–5.3)
POTASSIUM BLDV-SCNC: 3.3 MMOL/L (ref 3.5–5.3)
POTASSIUM SERPL-SCNC: 2.9 MMOL/L (ref 3.5–5.3)
POTASSIUM SERPL-SCNC: 3.2 MMOL/L (ref 3.5–5.3)
POTASSIUM SERPL-SCNC: 3.3 MMOL/L (ref 3.5–5.3)
POTASSIUM SERPL-SCNC: 3.5 MMOL/L (ref 3.5–5.3)
POTASSIUM SERPL-SCNC: 3.9 MMOL/L (ref 3.5–5.3)
POTASSIUM SERPL-SCNC: 4.1 MMOL/L (ref 3.5–5.3)
POTASSIUM SERPL-SCNC: 4.5 MMOL/L (ref 3.5–5.3)
PROCALCITONIN SERPL-MCNC: 0.72 NG/ML
PROCALCITONIN SERPL-MCNC: 0.79 NG/ML
PROT SERPL-MCNC: 6 G/DL (ref 6.4–8.2)
PROT SERPL-MCNC: 6.8 G/DL (ref 6.4–8.2)
PROT SERPL-MCNC: 8 G/DL (ref 6.4–8.2)
PROT SERPL-MCNC: 8.4 G/DL (ref 6.4–8.2)
PROT SERPL-MCNC: 8.5 G/DL (ref 6.4–8.2)
PROT UR STRIP.AUTO-MCNC: ABNORMAL MG/DL
PROT UR-ACNC: 156 MG/DL (ref 5–25)
PROT UR-ACNC: 156 MG/DL (ref 5–25)
PROT/CREAT UR: 1.9 MG/MG CREAT (ref 0–0.17)
PROTEIN ELECTROPHORESIS COMMENT: ABNORMAL
PROTHROMBIN TIME: 20.8 SECONDS (ref 9.8–12.8)
RBC # BLD AUTO: 3.16 X10*6/UL (ref 4.5–5.9)
RBC # BLD AUTO: 3.16 X10*6/UL (ref 4.5–5.9)
RBC # BLD AUTO: 3.2 X10*6/UL (ref 4.5–5.9)
RBC # BLD AUTO: 3.43 X10*6/UL (ref 4.5–5.9)
RBC # BLD AUTO: 3.6 X10*6/UL (ref 4.5–5.9)
RBC # BLD AUTO: 3.62 X10*6/UL (ref 4.5–5.9)
RBC # BLD AUTO: 4.52 X10*6/UL (ref 4.5–5.9)
RBC # BLD AUTO: 4.66 X10*6/UL (ref 4.5–5.9)
RBC # UR STRIP.AUTO: ABNORMAL /UL
RBC #/AREA URNS AUTO: ABNORMAL /HPF
RETICS #: 0.04 X10*6/UL (ref 0.02–0.11)
RETICS/RBC NFR AUTO: 1.1 % (ref 0.5–2)
RH FACTOR (ANTIGEN D): NORMAL
RIGHT VENTRICLE FREE WALL PEAK S': 10.3
RIGHT VENTRICLE PEAK SYSTOLIC PRESSURE: 40.5
S PNEUM AG UR QL: NEGATIVE
SAO2 % BLDA: 100 % (ref 94–100)
SAO2 % BLDA: 85 % (ref 94–100)
SAO2 % BLDA: 90 % (ref 94–100)
SAO2 % BLDA: 98 % (ref 94–100)
SAO2 % BLDA: 98 % (ref 94–100)
SAO2 % BLDA: 99 % (ref 94–100)
SAO2 % BLDV: 99 % (ref 45–75)
SARS-COV-2 RNA RESP QL NAA+PROBE: NOT DETECTED
SODIUM BLDA-SCNC: 135 MMOL/L (ref 136–145)
SODIUM BLDA-SCNC: 136 MMOL/L (ref 136–145)
SODIUM BLDA-SCNC: 137 MMOL/L (ref 136–145)
SODIUM BLDA-SCNC: 138 MMOL/L (ref 136–145)
SODIUM BLDA-SCNC: 140 MMOL/L (ref 136–145)
SODIUM BLDV-SCNC: 139 MMOL/L (ref 136–145)
SODIUM SERPL-SCNC: 140 MMOL/L (ref 136–145)
SODIUM SERPL-SCNC: 140 MMOL/L (ref 136–145)
SODIUM SERPL-SCNC: 141 MMOL/L (ref 136–145)
SODIUM SERPL-SCNC: 141 MMOL/L (ref 136–145)
SODIUM SERPL-SCNC: 142 MMOL/L (ref 136–145)
SODIUM SERPL-SCNC: 142 MMOL/L (ref 136–145)
SODIUM SERPL-SCNC: 143 MMOL/L (ref 136–145)
SODIUM SERPL-SCNC: 143 MMOL/L (ref 136–145)
SODIUM SERPL-SCNC: 145 MMOL/L (ref 136–145)
SP GR UR STRIP.AUTO: 1.02
SPECIMEN DRAWN FROM PATIENT: ABNORMAL
SQUAMOUS #/AREA URNS AUTO: ABNORMAL /HPF
STAPHYLOCOCCUS SPEC CULT: NORMAL
STAPHYLOCOCCUS SPEC CULT: NORMAL
TIDAL VOLUME: 450 ML
TSH SERPL-ACNC: 1.59 MIU/L (ref 0.44–3.98)
URINE ELECTROPHORESIS COMMENT: ABNORMAL
UROBILINOGEN UR STRIP.AUTO-MCNC: <2 MG/DL
VANCOMYCIN SERPL-MCNC: 10.6 UG/ML (ref 5–20)
VANCOMYCIN SERPL-MCNC: 11.5 UG/ML (ref 5–20)
VANCOMYCIN SERPL-MCNC: 12.1 UG/ML (ref 5–20)
VENTILATOR MODE: ABNORMAL
VENTILATOR MODE: ABNORMAL
VENTILATOR RATE: 20 BPM
WBC # BLD AUTO: 13.3 X10*3/UL (ref 4.4–11.3)
WBC # BLD AUTO: 13.3 X10*3/UL (ref 4.4–11.3)
WBC # BLD AUTO: 15.6 X10*3/UL (ref 4.4–11.3)
WBC # BLD AUTO: 16.6 X10*3/UL (ref 4.4–11.3)
WBC # BLD AUTO: 21.1 X10*3/UL (ref 4.4–11.3)
WBC # BLD AUTO: 24.7 X10*3/UL (ref 4.4–11.3)
WBC # BLD AUTO: 29.3 X10*3/UL (ref 4.4–11.3)
WBC # BLD AUTO: 38.9 X10*3/UL (ref 4.4–11.3)
WBC #/AREA URNS AUTO: >50 /HPF
WBC CLUMPS #/AREA URNS AUTO: ABNORMAL /HPF

## 2023-01-01 PROCEDURE — 84484 ASSAY OF TROPONIN QUANT: CPT | Performed by: STUDENT IN AN ORGANIZED HEALTH CARE EDUCATION/TRAINING PROGRAM

## 2023-01-01 PROCEDURE — 36620 INSERTION CATHETER ARTERY: CPT

## 2023-01-01 PROCEDURE — 2500000004 HC RX 250 GENERAL PHARMACY W/ HCPCS (ALT 636 FOR OP/ED): Performed by: INTERNAL MEDICINE

## 2023-01-01 PROCEDURE — 36415 COLL VENOUS BLD VENIPUNCTURE: CPT | Performed by: STUDENT IN AN ORGANIZED HEALTH CARE EDUCATION/TRAINING PROGRAM

## 2023-01-01 PROCEDURE — 2500000001 HC RX 250 WO HCPCS SELF ADMINISTERED DRUGS (ALT 637 FOR MEDICARE OP): Performed by: STUDENT IN AN ORGANIZED HEALTH CARE EDUCATION/TRAINING PROGRAM

## 2023-01-01 PROCEDURE — 82435 ASSAY OF BLOOD CHLORIDE: CPT | Performed by: STUDENT IN AN ORGANIZED HEALTH CARE EDUCATION/TRAINING PROGRAM

## 2023-01-01 PROCEDURE — 93010 ELECTROCARDIOGRAM REPORT: CPT | Performed by: INTERNAL MEDICINE

## 2023-01-01 PROCEDURE — 82947 ASSAY GLUCOSE BLOOD QUANT: CPT | Performed by: STUDENT IN AN ORGANIZED HEALTH CARE EDUCATION/TRAINING PROGRAM

## 2023-01-01 PROCEDURE — 99233 SBSQ HOSP IP/OBS HIGH 50: CPT | Performed by: STUDENT IN AN ORGANIZED HEALTH CARE EDUCATION/TRAINING PROGRAM

## 2023-01-01 PROCEDURE — 2500000004 HC RX 250 GENERAL PHARMACY W/ HCPCS (ALT 636 FOR OP/ED): Performed by: STUDENT IN AN ORGANIZED HEALTH CARE EDUCATION/TRAINING PROGRAM

## 2023-01-01 PROCEDURE — 84295 ASSAY OF SERUM SODIUM: CPT

## 2023-01-01 PROCEDURE — 80069 RENAL FUNCTION PANEL: CPT | Performed by: STUDENT IN AN ORGANIZED HEALTH CARE EDUCATION/TRAINING PROGRAM

## 2023-01-01 PROCEDURE — 87081 CULTURE SCREEN ONLY: CPT | Mod: ELYLAB | Performed by: HOSPITALIST

## 2023-01-01 PROCEDURE — 37799 UNLISTED PX VASCULAR SURGERY: CPT

## 2023-01-01 PROCEDURE — 71045 X-RAY EXAM CHEST 1 VIEW: CPT | Performed by: RADIOLOGY

## 2023-01-01 PROCEDURE — 2500000004 HC RX 250 GENERAL PHARMACY W/ HCPCS (ALT 636 FOR OP/ED)

## 2023-01-01 PROCEDURE — 83735 ASSAY OF MAGNESIUM: CPT | Performed by: HOSPITALIST

## 2023-01-01 PROCEDURE — 2020000001 HC ICU ROOM DAILY

## 2023-01-01 PROCEDURE — 36415 COLL VENOUS BLD VENIPUNCTURE: CPT | Performed by: HOSPITALIST

## 2023-01-01 PROCEDURE — 83605 ASSAY OF LACTIC ACID: CPT | Performed by: STUDENT IN AN ORGANIZED HEALTH CARE EDUCATION/TRAINING PROGRAM

## 2023-01-01 PROCEDURE — 87040 BLOOD CULTURE FOR BACTERIA: CPT | Mod: ELYLAB | Performed by: STUDENT IN AN ORGANIZED HEALTH CARE EDUCATION/TRAINING PROGRAM

## 2023-01-01 PROCEDURE — 80202 ASSAY OF VANCOMYCIN: CPT | Performed by: STUDENT IN AN ORGANIZED HEALTH CARE EDUCATION/TRAINING PROGRAM

## 2023-01-01 PROCEDURE — 2500000005 HC RX 250 GENERAL PHARMACY W/O HCPCS: Performed by: HOSPITALIST

## 2023-01-01 PROCEDURE — 87075 CULTR BACTERIA EXCEPT BLOOD: CPT | Mod: ELYLAB | Performed by: STUDENT IN AN ORGANIZED HEALTH CARE EDUCATION/TRAINING PROGRAM

## 2023-01-01 PROCEDURE — 87086 URINE CULTURE/COLONY COUNT: CPT

## 2023-01-01 PROCEDURE — 85025 COMPLETE CBC W/AUTO DIFF WBC: CPT

## 2023-01-01 PROCEDURE — 96372 THER/PROPH/DIAG INJ SC/IM: CPT

## 2023-01-01 PROCEDURE — 87070 CULTURE OTHR SPECIMN AEROBIC: CPT | Mod: ELYLAB | Performed by: HOSPITALIST

## 2023-01-01 PROCEDURE — 70490 CT SOFT TISSUE NECK W/O DYE: CPT

## 2023-01-01 PROCEDURE — 85027 COMPLETE CBC AUTOMATED: CPT | Performed by: STUDENT IN AN ORGANIZED HEALTH CARE EDUCATION/TRAINING PROGRAM

## 2023-01-01 PROCEDURE — 94002 VENT MGMT INPAT INIT DAY: CPT

## 2023-01-01 PROCEDURE — 93306 TTE W/DOPPLER COMPLETE: CPT

## 2023-01-01 PROCEDURE — 94003 VENT MGMT INPAT SUBQ DAY: CPT

## 2023-01-01 PROCEDURE — 93005 ELECTROCARDIOGRAM TRACING: CPT

## 2023-01-01 PROCEDURE — 71250 CT THORAX DX C-: CPT

## 2023-01-01 PROCEDURE — 84166 PROTEIN E-PHORESIS/URINE/CSF: CPT | Performed by: STUDENT IN AN ORGANIZED HEALTH CARE EDUCATION/TRAINING PROGRAM

## 2023-01-01 PROCEDURE — 2580000001 HC RX 258 IV SOLUTIONS: Performed by: STUDENT IN AN ORGANIZED HEALTH CARE EDUCATION/TRAINING PROGRAM

## 2023-01-01 PROCEDURE — 36556 INSERT NON-TUNNEL CV CATH: CPT

## 2023-01-01 PROCEDURE — 82330 ASSAY OF CALCIUM: CPT

## 2023-01-01 PROCEDURE — 82947 ASSAY GLUCOSE BLOOD QUANT: CPT

## 2023-01-01 PROCEDURE — 83735 ASSAY OF MAGNESIUM: CPT | Performed by: STUDENT IN AN ORGANIZED HEALTH CARE EDUCATION/TRAINING PROGRAM

## 2023-01-01 PROCEDURE — 84145 PROCALCITONIN (PCT): CPT | Mod: ELYLAB | Performed by: STUDENT IN AN ORGANIZED HEALTH CARE EDUCATION/TRAINING PROGRAM

## 2023-01-01 PROCEDURE — 86900 BLOOD TYPING SEROLOGIC ABO: CPT | Performed by: STUDENT IN AN ORGANIZED HEALTH CARE EDUCATION/TRAINING PROGRAM

## 2023-01-01 PROCEDURE — 94668 MNPJ CHEST WALL SBSQ: CPT

## 2023-01-01 PROCEDURE — 87449 NOS EACH ORGANISM AG IA: CPT | Mod: ELYLAB | Performed by: HOSPITALIST

## 2023-01-01 PROCEDURE — 96372 THER/PROPH/DIAG INJ SC/IM: CPT | Performed by: STUDENT IN AN ORGANIZED HEALTH CARE EDUCATION/TRAINING PROGRAM

## 2023-01-01 PROCEDURE — 87075 CULTR BACTERIA EXCEPT BLOOD: CPT

## 2023-01-01 PROCEDURE — 1100000001 HC PRIVATE ROOM DAILY

## 2023-01-01 PROCEDURE — 71045 X-RAY EXAM CHEST 1 VIEW: CPT | Mod: FY

## 2023-01-01 PROCEDURE — 84132 ASSAY OF SERUM POTASSIUM: CPT

## 2023-01-01 PROCEDURE — 99291 CRITICAL CARE FIRST HOUR: CPT | Performed by: HOSPITALIST

## 2023-01-01 PROCEDURE — 2500000005 HC RX 250 GENERAL PHARMACY W/O HCPCS: Performed by: INTERNAL MEDICINE

## 2023-01-01 PROCEDURE — 84484 ASSAY OF TROPONIN QUANT: CPT | Performed by: INTERNAL MEDICINE

## 2023-01-01 PROCEDURE — 37799 UNLISTED PX VASCULAR SURGERY: CPT | Performed by: STUDENT IN AN ORGANIZED HEALTH CARE EDUCATION/TRAINING PROGRAM

## 2023-01-01 PROCEDURE — 83010 ASSAY OF HAPTOGLOBIN QUANT: CPT | Performed by: STUDENT IN AN ORGANIZED HEALTH CARE EDUCATION/TRAINING PROGRAM

## 2023-01-01 PROCEDURE — 96375 TX/PRO/DX INJ NEW DRUG ADDON: CPT

## 2023-01-01 PROCEDURE — 2500000001 HC RX 250 WO HCPCS SELF ADMINISTERED DRUGS (ALT 637 FOR MEDICARE OP): Performed by: INTERNAL MEDICINE

## 2023-01-01 PROCEDURE — 80053 COMPREHEN METABOLIC PANEL: CPT | Performed by: STUDENT IN AN ORGANIZED HEALTH CARE EDUCATION/TRAINING PROGRAM

## 2023-01-01 PROCEDURE — 81001 URINALYSIS AUTO W/SCOPE: CPT | Performed by: STUDENT IN AN ORGANIZED HEALTH CARE EDUCATION/TRAINING PROGRAM

## 2023-01-01 PROCEDURE — 5A1945Z RESPIRATORY VENTILATION, 24-96 CONSECUTIVE HOURS: ICD-10-PCS | Performed by: STUDENT IN AN ORGANIZED HEALTH CARE EDUCATION/TRAINING PROGRAM

## 2023-01-01 PROCEDURE — 0BH17EZ INSERTION OF ENDOTRACHEAL AIRWAY INTO TRACHEA, VIA NATURAL OR ARTIFICIAL OPENING: ICD-10-PCS | Performed by: HOSPITALIST

## 2023-01-01 PROCEDURE — 85018 HEMOGLOBIN: CPT

## 2023-01-01 PROCEDURE — 2500000001 HC RX 250 WO HCPCS SELF ADMINISTERED DRUGS (ALT 637 FOR MEDICARE OP)

## 2023-01-01 PROCEDURE — 82947 ASSAY GLUCOSE BLOOD QUANT: CPT | Performed by: HOSPITALIST

## 2023-01-01 PROCEDURE — 99291 CRITICAL CARE FIRST HOUR: CPT

## 2023-01-01 PROCEDURE — 71250 CT THORAX DX C-: CPT | Performed by: RADIOLOGY

## 2023-01-01 PROCEDURE — 84132 ASSAY OF SERUM POTASSIUM: CPT | Performed by: STUDENT IN AN ORGANIZED HEALTH CARE EDUCATION/TRAINING PROGRAM

## 2023-01-01 PROCEDURE — 84484 ASSAY OF TROPONIN QUANT: CPT | Performed by: HOSPITALIST

## 2023-01-01 PROCEDURE — 87086 URINE CULTURE/COLONY COUNT: CPT | Mod: ELYLAB | Performed by: STUDENT IN AN ORGANIZED HEALTH CARE EDUCATION/TRAINING PROGRAM

## 2023-01-01 PROCEDURE — C9113 INJ PANTOPRAZOLE SODIUM, VIA: HCPCS | Performed by: HOSPITALIST

## 2023-01-01 PROCEDURE — 99223 1ST HOSP IP/OBS HIGH 75: CPT | Performed by: STUDENT IN AN ORGANIZED HEALTH CARE EDUCATION/TRAINING PROGRAM

## 2023-01-01 PROCEDURE — 80202 ASSAY OF VANCOMYCIN: CPT

## 2023-01-01 PROCEDURE — 70490 CT SOFT TISSUE NECK W/O DYE: CPT | Performed by: RADIOLOGY

## 2023-01-01 PROCEDURE — 94762 N-INVAS EAR/PLS OXIMTRY CONT: CPT

## 2023-01-01 PROCEDURE — 84145 PROCALCITONIN (PCT): CPT | Mod: ELYLAB | Performed by: HOSPITALIST

## 2023-01-01 PROCEDURE — 71045 X-RAY EXAM CHEST 1 VIEW: CPT

## 2023-01-01 PROCEDURE — 84484 ASSAY OF TROPONIN QUANT: CPT

## 2023-01-01 PROCEDURE — C9113 INJ PANTOPRAZOLE SODIUM, VIA: HCPCS | Performed by: STUDENT IN AN ORGANIZED HEALTH CARE EDUCATION/TRAINING PROGRAM

## 2023-01-01 PROCEDURE — 51702 INSERT TEMP BLADDER CATH: CPT

## 2023-01-01 PROCEDURE — 94640 AIRWAY INHALATION TREATMENT: CPT

## 2023-01-01 PROCEDURE — 86320 SERUM IMMUNOELECTROPHORESIS: CPT | Performed by: STUDENT IN AN ORGANIZED HEALTH CARE EDUCATION/TRAINING PROGRAM

## 2023-01-01 PROCEDURE — 5A0935A ASSISTANCE WITH RESPIRATORY VENTILATION, LESS THAN 24 CONSECUTIVE HOURS, HIGH NASAL FLOW/VELOCITY: ICD-10-PCS | Performed by: STUDENT IN AN ORGANIZED HEALTH CARE EDUCATION/TRAINING PROGRAM

## 2023-01-01 PROCEDURE — 84295 ASSAY OF SERUM SODIUM: CPT | Performed by: HOSPITALIST

## 2023-01-01 PROCEDURE — 82805 BLOOD GASES W/O2 SATURATION: CPT

## 2023-01-01 PROCEDURE — 94667 MNPJ CHEST WALL 1ST: CPT

## 2023-01-01 PROCEDURE — 85025 COMPLETE CBC W/AUTO DIFF WBC: CPT | Performed by: STUDENT IN AN ORGANIZED HEALTH CARE EDUCATION/TRAINING PROGRAM

## 2023-01-01 PROCEDURE — 2500000004 HC RX 250 GENERAL PHARMACY W/ HCPCS (ALT 636 FOR OP/ED): Performed by: HOSPITALIST

## 2023-01-01 PROCEDURE — S0166 INJ OLANZAPINE 2.5MG: HCPCS | Performed by: INTERNAL MEDICINE

## 2023-01-01 PROCEDURE — 83605 ASSAY OF LACTIC ACID: CPT | Performed by: NURSE PRACTITIONER

## 2023-01-01 PROCEDURE — 82550 ASSAY OF CK (CPK): CPT | Performed by: STUDENT IN AN ORGANIZED HEALTH CARE EDUCATION/TRAINING PROGRAM

## 2023-01-01 PROCEDURE — 83880 ASSAY OF NATRIURETIC PEPTIDE: CPT | Performed by: STUDENT IN AN ORGANIZED HEALTH CARE EDUCATION/TRAINING PROGRAM

## 2023-01-01 PROCEDURE — 1159F MED LIST DOCD IN RCRD: CPT | Performed by: FAMILY MEDICINE

## 2023-01-01 PROCEDURE — 87040 BLOOD CULTURE FOR BACTERIA: CPT

## 2023-01-01 PROCEDURE — 84100 ASSAY OF PHOSPHORUS: CPT

## 2023-01-01 PROCEDURE — 96365 THER/PROPH/DIAG IV INF INIT: CPT | Mod: 59

## 2023-01-01 PROCEDURE — 97530 THERAPEUTIC ACTIVITIES: CPT | Mod: GO

## 2023-01-01 PROCEDURE — 0CJS8ZZ INSPECTION OF LARYNX, VIA NATURAL OR ARTIFICIAL OPENING ENDOSCOPIC: ICD-10-PCS

## 2023-01-01 PROCEDURE — 3E033XZ INTRODUCTION OF VASOPRESSOR INTO PERIPHERAL VEIN, PERCUTANEOUS APPROACH: ICD-10-PCS | Performed by: STUDENT IN AN ORGANIZED HEALTH CARE EDUCATION/TRAINING PROGRAM

## 2023-01-01 PROCEDURE — 83036 HEMOGLOBIN GLYCOSYLATED A1C: CPT | Mod: ELYLAB | Performed by: NURSE PRACTITIONER

## 2023-01-01 PROCEDURE — 2500000002 HC RX 250 W HCPCS SELF ADMINISTERED DRUGS (ALT 637 FOR MEDICARE OP, ALT 636 FOR OP/ED): Performed by: EMERGENCY MEDICINE

## 2023-01-01 PROCEDURE — 2500000001 HC RX 250 WO HCPCS SELF ADMINISTERED DRUGS (ALT 637 FOR MEDICARE OP): Performed by: HOSPITALIST

## 2023-01-01 PROCEDURE — 74018 RADEX ABDOMEN 1 VIEW: CPT | Mod: FY

## 2023-01-01 PROCEDURE — 87081 CULTURE SCREEN ONLY: CPT

## 2023-01-01 PROCEDURE — 80069 RENAL FUNCTION PANEL: CPT

## 2023-01-01 PROCEDURE — 31500 INSERT EMERGENCY AIRWAY: CPT | Mod: GC | Performed by: INTERNAL MEDICINE

## 2023-01-01 PROCEDURE — 96372 THER/PROPH/DIAG INJ SC/IM: CPT | Performed by: INTERNAL MEDICINE

## 2023-01-01 PROCEDURE — 31720 CLEARANCE OF AIRWAYS: CPT

## 2023-01-01 PROCEDURE — 85018 HEMOGLOBIN: CPT | Performed by: HOSPITALIST

## 2023-01-01 PROCEDURE — 97166 OT EVAL MOD COMPLEX 45 MIN: CPT | Mod: GO

## 2023-01-01 PROCEDURE — 99213 OFFICE O/P EST LOW 20 MIN: CPT | Performed by: STUDENT IN AN ORGANIZED HEALTH CARE EDUCATION/TRAINING PROGRAM

## 2023-01-01 PROCEDURE — 99231 SBSQ HOSP IP/OBS SF/LOW 25: CPT | Performed by: INTERNAL MEDICINE

## 2023-01-01 PROCEDURE — 84156 ASSAY OF PROTEIN URINE: CPT | Performed by: STUDENT IN AN ORGANIZED HEALTH CARE EDUCATION/TRAINING PROGRAM

## 2023-01-01 PROCEDURE — 5A1935Z RESPIRATORY VENTILATION, LESS THAN 24 CONSECUTIVE HOURS: ICD-10-PCS | Performed by: INTERNAL MEDICINE

## 2023-01-01 PROCEDURE — 84165 PROTEIN E-PHORESIS SERUM: CPT | Performed by: STUDENT IN AN ORGANIZED HEALTH CARE EDUCATION/TRAINING PROGRAM

## 2023-01-01 PROCEDURE — 82140 ASSAY OF AMMONIA: CPT | Performed by: HOSPITALIST

## 2023-01-01 PROCEDURE — 82570 ASSAY OF URINE CREATININE: CPT | Performed by: STUDENT IN AN ORGANIZED HEALTH CARE EDUCATION/TRAINING PROGRAM

## 2023-01-01 PROCEDURE — 83735 ASSAY OF MAGNESIUM: CPT

## 2023-01-01 PROCEDURE — 71045 X-RAY EXAM CHEST 1 VIEW: CPT | Performed by: STUDENT IN AN ORGANIZED HEALTH CARE EDUCATION/TRAINING PROGRAM

## 2023-01-01 PROCEDURE — 5A1935Z RESPIRATORY VENTILATION, LESS THAN 24 CONSECUTIVE HOURS: ICD-10-PCS | Performed by: HOSPITALIST

## 2023-01-01 PROCEDURE — 36415 COLL VENOUS BLD VENIPUNCTURE: CPT

## 2023-01-01 PROCEDURE — 83605 ASSAY OF LACTIC ACID: CPT

## 2023-01-01 PROCEDURE — 83615 LACTATE (LD) (LDH) ENZYME: CPT | Performed by: STUDENT IN AN ORGANIZED HEALTH CARE EDUCATION/TRAINING PROGRAM

## 2023-01-01 PROCEDURE — 99213 OFFICE O/P EST LOW 20 MIN: CPT | Performed by: FAMILY MEDICINE

## 2023-01-01 PROCEDURE — 99232 SBSQ HOSP IP/OBS MODERATE 35: CPT | Performed by: INTERNAL MEDICINE

## 2023-01-01 PROCEDURE — 84295 ASSAY OF SERUM SODIUM: CPT | Performed by: INTERNAL MEDICINE

## 2023-01-01 PROCEDURE — 31500 INSERT EMERGENCY AIRWAY: CPT | Performed by: INTERNAL MEDICINE

## 2023-01-01 PROCEDURE — 80069 RENAL FUNCTION PANEL: CPT | Performed by: INTERNAL MEDICINE

## 2023-01-01 PROCEDURE — 82330 ASSAY OF CALCIUM: CPT | Performed by: STUDENT IN AN ORGANIZED HEALTH CARE EDUCATION/TRAINING PROGRAM

## 2023-01-01 PROCEDURE — 84100 ASSAY OF PHOSPHORUS: CPT | Performed by: HOSPITALIST

## 2023-01-01 PROCEDURE — 96361 HYDRATE IV INFUSION ADD-ON: CPT

## 2023-01-01 PROCEDURE — 1210000001 HC SEMI-PRIVATE ROOM DAILY

## 2023-01-01 PROCEDURE — 74018 RADEX ABDOMEN 1 VIEW: CPT

## 2023-01-01 PROCEDURE — 85025 COMPLETE CBC W/AUTO DIFF WBC: CPT | Performed by: HOSPITALIST

## 2023-01-01 PROCEDURE — 82330 ASSAY OF CALCIUM: CPT | Performed by: HOSPITALIST

## 2023-01-01 PROCEDURE — 84155 ASSAY OF PROTEIN SERUM: CPT | Performed by: STUDENT IN AN ORGANIZED HEALTH CARE EDUCATION/TRAINING PROGRAM

## 2023-01-01 PROCEDURE — 2500000005 HC RX 250 GENERAL PHARMACY W/O HCPCS

## 2023-01-01 PROCEDURE — 97110 THERAPEUTIC EXERCISES: CPT | Mod: GP | Performed by: PHYSICAL THERAPIST

## 2023-01-01 PROCEDURE — 93306 TTE W/DOPPLER COMPLETE: CPT | Performed by: INTERNAL MEDICINE

## 2023-01-01 PROCEDURE — 85610 PROTHROMBIN TIME: CPT | Performed by: STUDENT IN AN ORGANIZED HEALTH CARE EDUCATION/TRAINING PROGRAM

## 2023-01-01 PROCEDURE — 2500000005 HC RX 250 GENERAL PHARMACY W/O HCPCS: Performed by: STUDENT IN AN ORGANIZED HEALTH CARE EDUCATION/TRAINING PROGRAM

## 2023-01-01 PROCEDURE — 36600 WITHDRAWAL OF ARTERIAL BLOOD: CPT

## 2023-01-01 PROCEDURE — 2500000004 HC RX 250 GENERAL PHARMACY W/ HCPCS (ALT 636 FOR OP/ED): Performed by: EMERGENCY MEDICINE

## 2023-01-01 PROCEDURE — 99291 CRITICAL CARE FIRST HOUR: CPT | Mod: 25 | Performed by: STUDENT IN AN ORGANIZED HEALTH CARE EDUCATION/TRAINING PROGRAM

## 2023-01-01 PROCEDURE — 96372 THER/PROPH/DIAG INJ SC/IM: CPT | Performed by: HOSPITALIST

## 2023-01-01 PROCEDURE — 83605 ASSAY OF LACTIC ACID: CPT | Performed by: HOSPITALIST

## 2023-01-01 PROCEDURE — 2500000002 HC RX 250 W HCPCS SELF ADMINISTERED DRUGS (ALT 637 FOR MEDICARE OP, ALT 636 FOR OP/ED): Performed by: STUDENT IN AN ORGANIZED HEALTH CARE EDUCATION/TRAINING PROGRAM

## 2023-01-01 PROCEDURE — 99221 1ST HOSP IP/OBS SF/LOW 40: CPT | Performed by: SURGERY

## 2023-01-01 PROCEDURE — 74176 CT ABD & PELVIS W/O CONTRAST: CPT | Performed by: RADIOLOGY

## 2023-01-01 PROCEDURE — 36620 INSERTION CATHETER ARTERY: CPT | Performed by: EMERGENCY MEDICINE

## 2023-01-01 PROCEDURE — 3074F SYST BP LT 130 MM HG: CPT | Performed by: FAMILY MEDICINE

## 2023-01-01 PROCEDURE — 3078F DIAST BP <80 MM HG: CPT | Performed by: FAMILY MEDICINE

## 2023-01-01 PROCEDURE — 2500000002 HC RX 250 W HCPCS SELF ADMINISTERED DRUGS (ALT 637 FOR MEDICARE OP, ALT 636 FOR OP/ED): Performed by: NURSE PRACTITIONER

## 2023-01-01 PROCEDURE — 84132 ASSAY OF SERUM POTASSIUM: CPT | Performed by: NURSE PRACTITIONER

## 2023-01-01 PROCEDURE — 82805 BLOOD GASES W/O2 SATURATION: CPT | Performed by: STUDENT IN AN ORGANIZED HEALTH CARE EDUCATION/TRAINING PROGRAM

## 2023-01-01 PROCEDURE — 85018 HEMOGLOBIN: CPT | Performed by: INTERNAL MEDICINE

## 2023-01-01 PROCEDURE — 0BH17EZ INSERTION OF ENDOTRACHEAL AIRWAY INTO TRACHEA, VIA NATURAL OR ARTIFICIAL OPENING: ICD-10-PCS | Performed by: INTERNAL MEDICINE

## 2023-01-01 PROCEDURE — 82435 ASSAY OF BLOOD CHLORIDE: CPT

## 2023-01-01 PROCEDURE — 74018 RADEX ABDOMEN 1 VIEW: CPT | Performed by: RADIOLOGY

## 2023-01-01 PROCEDURE — 99239 HOSP IP/OBS DSCHRG MGMT >30: CPT | Performed by: INTERNAL MEDICINE

## 2023-01-01 PROCEDURE — 31500 INSERT EMERGENCY AIRWAY: CPT | Performed by: HOSPITALIST

## 2023-01-01 PROCEDURE — 86325 OTHER IMMUNOELECTROPHORESIS: CPT | Performed by: STUDENT IN AN ORGANIZED HEALTH CARE EDUCATION/TRAINING PROGRAM

## 2023-01-01 PROCEDURE — 74176 CT ABD & PELVIS W/O CONTRAST: CPT

## 2023-01-01 PROCEDURE — 86334 IMMUNOFIX E-PHORESIS SERUM: CPT | Performed by: STUDENT IN AN ORGANIZED HEALTH CARE EDUCATION/TRAINING PROGRAM

## 2023-01-01 PROCEDURE — 3E043XZ INTRODUCTION OF VASOPRESSOR INTO CENTRAL VEIN, PERCUTANEOUS APPROACH: ICD-10-PCS | Performed by: RADIOLOGY

## 2023-01-01 PROCEDURE — 02HV33Z INSERTION OF INFUSION DEVICE INTO SUPERIOR VENA CAVA, PERCUTANEOUS APPROACH: ICD-10-PCS

## 2023-01-01 PROCEDURE — 97162 PT EVAL MOD COMPLEX 30 MIN: CPT | Mod: GP | Performed by: PHYSICAL THERAPIST

## 2023-01-01 PROCEDURE — 87636 SARSCOV2 & INF A&B AMP PRB: CPT | Performed by: STUDENT IN AN ORGANIZED HEALTH CARE EDUCATION/TRAINING PROGRAM

## 2023-01-01 PROCEDURE — 99203 OFFICE O/P NEW LOW 30 MIN: CPT | Performed by: STUDENT IN AN ORGANIZED HEALTH CARE EDUCATION/TRAINING PROGRAM

## 2023-01-01 PROCEDURE — 85045 AUTOMATED RETICULOCYTE COUNT: CPT | Performed by: STUDENT IN AN ORGANIZED HEALTH CARE EDUCATION/TRAINING PROGRAM

## 2023-01-01 PROCEDURE — 87641 MR-STAPH DNA AMP PROBE: CPT | Performed by: EMERGENCY MEDICINE

## 2023-01-01 PROCEDURE — 02HV33Z INSERTION OF INFUSION DEVICE INTO SUPERIOR VENA CAVA, PERCUTANEOUS APPROACH: ICD-10-PCS | Performed by: RADIOLOGY

## 2023-01-01 PROCEDURE — 87899 AGENT NOS ASSAY W/OPTIC: CPT | Mod: ELYLAB | Performed by: HOSPITALIST

## 2023-01-01 PROCEDURE — 36556 INSERT NON-TUNNEL CV CATH: CPT | Performed by: EMERGENCY MEDICINE

## 2023-01-01 PROCEDURE — 84443 ASSAY THYROID STIM HORMONE: CPT | Performed by: HOSPITALIST

## 2023-01-01 RX ORDER — POTASSIUM CHLORIDE 29.8 MG/ML
40 INJECTION INTRAVENOUS ONCE
Status: DISCONTINUED | OUTPATIENT
Start: 2023-01-01 | End: 2023-01-01

## 2023-01-01 RX ORDER — MAGNESIUM SULFATE HEPTAHYDRATE 40 MG/ML
INJECTION, SOLUTION INTRAVENOUS
Status: COMPLETED
Start: 2023-01-01 | End: 2023-01-01

## 2023-01-01 RX ORDER — POLYETHYLENE GLYCOL 3350 17 G/17G
17 POWDER, FOR SOLUTION ORAL DAILY
Status: DISCONTINUED | OUTPATIENT
Start: 2023-01-01 | End: 2023-01-01

## 2023-01-01 RX ORDER — ACETAMINOPHEN 160 MG/5ML
650 SOLUTION ORAL EVERY 4 HOURS PRN
Status: DISCONTINUED | OUTPATIENT
Start: 2023-01-01 | End: 2023-01-01 | Stop reason: HOSPADM

## 2023-01-01 RX ORDER — CLINDAMYCIN PHOSPHATE 900 MG/50ML
900 INJECTION, SOLUTION INTRAVENOUS EVERY 8 HOURS
Status: DISCONTINUED | OUTPATIENT
Start: 2023-01-01 | End: 2023-01-01 | Stop reason: HOSPADM

## 2023-01-01 RX ORDER — FERROUS SULFATE, DRIED 160(50) MG
1 TABLET, EXTENDED RELEASE ORAL DAILY
Status: DISCONTINUED | OUTPATIENT
Start: 2023-01-01 | End: 2023-01-01 | Stop reason: HOSPADM

## 2023-01-01 RX ORDER — ACETAMINOPHEN 650 MG/1
650 SUPPOSITORY RECTAL EVERY 4 HOURS PRN
Status: DISCONTINUED | OUTPATIENT
Start: 2023-01-01 | End: 2023-01-01

## 2023-01-01 RX ORDER — ACETAMINOPHEN 325 MG/1
650 TABLET ORAL EVERY 4 HOURS PRN
COMMUNITY

## 2023-01-01 RX ORDER — SUCCINYLCHOLINE CHLORIDE 20 MG/ML
INJECTION INTRAMUSCULAR; INTRAVENOUS
Status: COMPLETED
Start: 2023-01-01 | End: 2023-01-01

## 2023-01-01 RX ORDER — TIOTROPIUM BROMIDE 18 UG/1
1 CAPSULE ORAL; RESPIRATORY (INHALATION)
Status: ON HOLD | COMMUNITY
End: 2023-01-01 | Stop reason: ENTERED-IN-ERROR

## 2023-01-01 RX ORDER — LEVOFLOXACIN 5 MG/ML
500 INJECTION, SOLUTION INTRAVENOUS ONCE
Status: COMPLETED | OUTPATIENT
Start: 2023-01-01 | End: 2023-01-01

## 2023-01-01 RX ORDER — DEXTROSE 50 % IN WATER (D50W) INTRAVENOUS SYRINGE
25
Status: DISCONTINUED | OUTPATIENT
Start: 2023-01-01 | End: 2023-01-01 | Stop reason: HOSPADM

## 2023-01-01 RX ORDER — METOPROLOL TARTRATE 100 MG/1
100 TABLET ORAL DAILY
COMMUNITY
End: 2023-01-01 | Stop reason: ENTERED-IN-ERROR

## 2023-01-01 RX ORDER — ESOMEPRAZOLE MAGNESIUM 20 MG/1
20 GRANULE, DELAYED RELEASE ORAL
Status: DISCONTINUED | OUTPATIENT
Start: 2023-01-01 | End: 2023-01-01

## 2023-01-01 RX ORDER — HEPARIN SODIUM 5000 [USP'U]/ML
5000 INJECTION, SOLUTION INTRAVENOUS; SUBCUTANEOUS EVERY 8 HOURS
Status: CANCELLED | OUTPATIENT
Start: 2023-01-01

## 2023-01-01 RX ORDER — ALBUTEROL SULFATE 0.83 MG/ML
2.5 SOLUTION RESPIRATORY (INHALATION) EVERY 4 HOURS
Status: DISCONTINUED | OUTPATIENT
Start: 2023-01-01 | End: 2023-01-01 | Stop reason: HOSPADM

## 2023-01-01 RX ORDER — ALBUTEROL SULFATE 0.83 MG/ML
2.5 SOLUTION RESPIRATORY (INHALATION) EVERY 4 HOURS
Status: DISCONTINUED | OUTPATIENT
Start: 2023-01-01 | End: 2023-01-01

## 2023-01-01 RX ORDER — ROCURONIUM BROMIDE 10 MG/ML
100 INJECTION, SOLUTION INTRAVENOUS ONCE
Status: COMPLETED | OUTPATIENT
Start: 2023-01-01 | End: 2023-01-01

## 2023-01-01 RX ORDER — METOPROLOL TARTRATE 100 MG/1
100 TABLET ORAL NIGHTLY
COMMUNITY

## 2023-01-01 RX ORDER — SUCCINYLCHOLINE CHLORIDE 20 MG/ML
50 INJECTION INTRAMUSCULAR; INTRAVENOUS ONCE
Status: COMPLETED | OUTPATIENT
Start: 2023-01-01 | End: 2023-01-01

## 2023-01-01 RX ORDER — HYOSCYAMINE SULFATE 0.12 MG/1
0.12 TABLET, ORALLY DISINTEGRATING ORAL EVERY 4 HOURS PRN
Status: DISCONTINUED | OUTPATIENT
Start: 2023-01-01 | End: 2023-01-01 | Stop reason: HOSPADM

## 2023-01-01 RX ORDER — MORPHINE SULFATE 2 MG/ML
1 INJECTION, SOLUTION INTRAMUSCULAR; INTRAVENOUS ONCE
Status: COMPLETED | OUTPATIENT
Start: 2023-01-01 | End: 2023-01-01

## 2023-01-01 RX ORDER — GUAIFENESIN 1200 MG
TABLET, EXTENDED RELEASE 12 HR ORAL 4 TIMES DAILY
COMMUNITY
End: 2023-01-01 | Stop reason: ENTERED-IN-ERROR

## 2023-01-01 RX ORDER — MIDAZOLAM HYDROCHLORIDE 1 MG/ML
2 INJECTION, SOLUTION INTRAMUSCULAR; INTRAVENOUS ONCE
Status: COMPLETED | OUTPATIENT
Start: 2023-01-01 | End: 2023-01-01

## 2023-01-01 RX ORDER — ACETAMINOPHEN 325 MG/1
650 TABLET ORAL EVERY 4 HOURS PRN
Status: DISCONTINUED | OUTPATIENT
Start: 2023-01-01 | End: 2023-01-01

## 2023-01-01 RX ORDER — FENTANYL CITRATE 50 UG/ML
100 INJECTION, SOLUTION INTRAMUSCULAR; INTRAVENOUS ONCE
Status: COMPLETED | OUTPATIENT
Start: 2023-01-01 | End: 2023-01-01

## 2023-01-01 RX ORDER — LOVASTATIN 40 MG/1
40 TABLET ORAL NIGHTLY
COMMUNITY
End: 2023-01-01

## 2023-01-01 RX ORDER — FENTANYL CITRATE-0.9 % NACL/PF 10 MCG/ML
25-200 PLASTIC BAG, INJECTION (ML) INTRAVENOUS CONTINUOUS
Status: DISCONTINUED | OUTPATIENT
Start: 2023-01-01 | End: 2023-01-01

## 2023-01-01 RX ORDER — ALBUTEROL SULFATE 0.83 MG/ML
2.5 SOLUTION RESPIRATORY (INHALATION) EVERY 6 HOURS PRN
Status: DISCONTINUED | OUTPATIENT
Start: 2023-01-01 | End: 2023-01-01

## 2023-01-01 RX ORDER — DEXMEDETOMIDINE HYDROCHLORIDE 4 UG/ML
.1-1.5 INJECTION, SOLUTION INTRAVENOUS CONTINUOUS
Status: DISCONTINUED | OUTPATIENT
Start: 2023-01-01 | End: 2023-01-01

## 2023-01-01 RX ORDER — MORPHINE SULFATE ORAL SOLUTION 10 MG/5ML
5 SOLUTION ORAL
Status: DISCONTINUED | OUTPATIENT
Start: 2023-01-01 | End: 2023-01-01 | Stop reason: HOSPADM

## 2023-01-01 RX ORDER — ETOMIDATE 2 MG/ML
INJECTION INTRAVENOUS
Status: COMPLETED
Start: 2023-01-01 | End: 2023-01-01

## 2023-01-01 RX ORDER — DEXTROSE MONOHYDRATE 100 MG/ML
0.3 INJECTION, SOLUTION INTRAVENOUS ONCE AS NEEDED
Status: DISCONTINUED | OUTPATIENT
Start: 2023-01-01 | End: 2023-01-01 | Stop reason: HOSPADM

## 2023-01-01 RX ORDER — INSULIN LISPRO 100 [IU]/ML
0-5 INJECTION, SOLUTION INTRAVENOUS; SUBCUTANEOUS
Status: DISCONTINUED | OUTPATIENT
Start: 2023-01-01 | End: 2023-01-01 | Stop reason: HOSPADM

## 2023-01-01 RX ORDER — SODIUM CHLORIDE AND POTASSIUM CHLORIDE 300; 900 MG/100ML; MG/100ML
50 INJECTION, SOLUTION INTRAVENOUS CONTINUOUS
Status: DISCONTINUED | OUTPATIENT
Start: 2023-01-01 | End: 2023-01-01

## 2023-01-01 RX ORDER — ALBUTEROL SULFATE 0.83 MG/ML
2.5 SOLUTION RESPIRATORY (INHALATION)
Status: ON HOLD | COMMUNITY
End: 2023-01-01 | Stop reason: ENTERED-IN-ERROR

## 2023-01-01 RX ORDER — FENTANYL CITRATE 50 UG/ML
INJECTION, SOLUTION INTRAMUSCULAR; INTRAVENOUS
Status: COMPLETED
Start: 2023-01-01 | End: 2023-01-01

## 2023-01-01 RX ORDER — HEPARIN SODIUM 5000 [USP'U]/ML
5000 INJECTION, SOLUTION INTRAVENOUS; SUBCUTANEOUS EVERY 8 HOURS
Status: DISCONTINUED | OUTPATIENT
Start: 2023-01-01 | End: 2023-01-01

## 2023-01-01 RX ORDER — CLINDAMYCIN PHOSPHATE 900 MG/50ML
900 INJECTION, SOLUTION INTRAVENOUS EVERY 8 HOURS
Status: DISCONTINUED | OUTPATIENT
Start: 2023-01-01 | End: 2023-01-01

## 2023-01-01 RX ORDER — ATORVASTATIN CALCIUM 10 MG/1
10 TABLET, FILM COATED ORAL NIGHTLY
Status: DISCONTINUED | OUTPATIENT
Start: 2023-01-01 | End: 2023-01-01 | Stop reason: HOSPADM

## 2023-01-01 RX ORDER — ONDANSETRON HYDROCHLORIDE 2 MG/ML
4 INJECTION, SOLUTION INTRAVENOUS EVERY 8 HOURS PRN
Status: DISCONTINUED | OUTPATIENT
Start: 2023-01-01 | End: 2023-01-01

## 2023-01-01 RX ORDER — TRAMADOL HYDROCHLORIDE 50 MG/1
50 TABLET ORAL EVERY 6 HOURS PRN
COMMUNITY

## 2023-01-01 RX ORDER — SENNOSIDES 8.6 MG/1
2 TABLET ORAL 2 TIMES DAILY
Status: DISCONTINUED | OUTPATIENT
Start: 2023-01-01 | End: 2023-01-01

## 2023-01-01 RX ORDER — LOVASTATIN 40 MG/1
40 TABLET ORAL NIGHTLY
COMMUNITY

## 2023-01-01 RX ORDER — LACTULOSE 10 G/15ML
30 SOLUTION ORAL ONCE
Status: COMPLETED | OUTPATIENT
Start: 2023-01-01 | End: 2023-01-01

## 2023-01-01 RX ORDER — NOREPINEPHRINE BITARTRATE/D5W 8 MG/250ML
.01-1 PLASTIC BAG, INJECTION (ML) INTRAVENOUS CONTINUOUS
Status: DISCONTINUED | OUTPATIENT
Start: 2023-01-01 | End: 2023-01-01 | Stop reason: HOSPADM

## 2023-01-01 RX ORDER — ALBUTEROL SULFATE 0.83 MG/ML
2.5 SOLUTION RESPIRATORY (INHALATION) EVERY 4 HOURS PRN
Status: DISCONTINUED | OUTPATIENT
Start: 2023-01-01 | End: 2023-01-01

## 2023-01-01 RX ORDER — POTASSIUM CHLORIDE 1.5 G/1.58G
40 POWDER, FOR SOLUTION ORAL ONCE
Status: COMPLETED | OUTPATIENT
Start: 2023-01-01 | End: 2023-01-01

## 2023-01-01 RX ORDER — FENTANYL CITRATE-0.9 % NACL/PF 10 MCG/ML
PLASTIC BAG, INJECTION (ML) INTRAVENOUS
Status: COMPLETED
Start: 2023-01-01 | End: 2023-01-01

## 2023-01-01 RX ORDER — L. ACIDOPHILUS/L.BULGARICUS 1MM CELL
1 TABLET ORAL DAILY
Status: DISCONTINUED | OUTPATIENT
Start: 2023-01-01 | End: 2023-01-01 | Stop reason: HOSPADM

## 2023-01-01 RX ORDER — DEXTROSE MONOHYDRATE 100 MG/ML
0.3 INJECTION, SOLUTION INTRAVENOUS ONCE AS NEEDED
Status: COMPLETED | OUTPATIENT
Start: 2023-01-01 | End: 2023-01-01

## 2023-01-01 RX ORDER — ENOXAPARIN SODIUM 100 MG/ML
40 INJECTION SUBCUTANEOUS EVERY 24 HOURS
Status: DISCONTINUED | OUTPATIENT
Start: 2023-01-01 | End: 2023-01-01

## 2023-01-01 RX ORDER — DEXTROSE 50 % IN WATER (D50W) INTRAVENOUS SYRINGE
25
Status: DISCONTINUED | OUTPATIENT
Start: 2023-01-01 | End: 2023-01-01

## 2023-01-01 RX ORDER — ETOMIDATE 2 MG/ML
10 INJECTION INTRAVENOUS ONCE
Status: COMPLETED | OUTPATIENT
Start: 2023-01-01 | End: 2023-01-01

## 2023-01-01 RX ORDER — GUAIFENESIN 100 MG/5ML
200 SOLUTION ORAL EVERY 4 HOURS PRN
COMMUNITY

## 2023-01-01 RX ORDER — ACETAMINOPHEN 160 MG/5ML
650 SOLUTION ORAL EVERY 4 HOURS PRN
Status: DISCONTINUED | OUTPATIENT
Start: 2023-01-01 | End: 2023-01-01

## 2023-01-01 RX ORDER — POLYETHYLENE GLYCOL 3350 17 G/17G
17 POWDER, FOR SOLUTION ORAL DAILY
Status: DISCONTINUED | OUTPATIENT
Start: 2023-01-01 | End: 2023-01-01 | Stop reason: HOSPADM

## 2023-01-01 RX ORDER — POTASSIUM CHLORIDE 29.8 MG/ML
INJECTION INTRAVENOUS
Status: DISPENSED
Start: 2023-01-01 | End: 2023-01-01

## 2023-01-01 RX ORDER — CEFTRIAXONE 1 G/50ML
1 INJECTION, SOLUTION INTRAVENOUS ONCE
Status: COMPLETED | OUTPATIENT
Start: 2023-01-01 | End: 2023-01-01

## 2023-01-01 RX ORDER — LOVASTATIN 40 MG/1
40 TABLET ORAL NIGHTLY
Status: DISCONTINUED | OUTPATIENT
Start: 2023-01-01 | End: 2023-01-01

## 2023-01-01 RX ORDER — PROPOFOL 10 MG/ML
5-50 INJECTION, EMULSION INTRAVENOUS CONTINUOUS
Status: DISCONTINUED | OUTPATIENT
Start: 2023-01-01 | End: 2023-01-01

## 2023-01-01 RX ORDER — MORPHINE SULFATE 20 MG/ML
5 SOLUTION ORAL
Status: DISCONTINUED | OUTPATIENT
Start: 2023-01-01 | End: 2023-01-01

## 2023-01-01 RX ORDER — NOREPINEPHRINE BITARTRATE/D5W 8 MG/250ML
PLASTIC BAG, INJECTION (ML) INTRAVENOUS
Status: COMPLETED
Start: 2023-01-01 | End: 2023-01-01

## 2023-01-01 RX ORDER — ALBUTEROL SULFATE 0.83 MG/ML
2.5 SOLUTION RESPIRATORY (INHALATION)
COMMUNITY
End: 2023-01-01 | Stop reason: ENTERED-IN-ERROR

## 2023-01-01 RX ORDER — FLUTICASONE FUROATE AND VILANTEROL 100; 25 UG/1; UG/1
1 POWDER RESPIRATORY (INHALATION)
Status: DISCONTINUED | OUTPATIENT
Start: 2023-01-01 | End: 2023-01-01 | Stop reason: HOSPADM

## 2023-01-01 RX ORDER — CHOLECALCIFEROL (VITAMIN D3) 25 MCG
1 TABLET,CHEWABLE ORAL 2 TIMES DAILY
COMMUNITY
Start: 2020-01-24 | End: 2023-01-01 | Stop reason: ENTERED-IN-ERROR

## 2023-01-01 RX ORDER — MORPHINE SULFATE 2 MG/ML
2 INJECTION, SOLUTION INTRAMUSCULAR; INTRAVENOUS
Status: DISCONTINUED | OUTPATIENT
Start: 2023-01-01 | End: 2023-01-01

## 2023-01-01 RX ORDER — IPRATROPIUM BROMIDE 0.5 MG/2.5ML
0.5 SOLUTION RESPIRATORY (INHALATION) EVERY 8 HOURS PRN
COMMUNITY

## 2023-01-01 RX ORDER — LEVOFLOXACIN 5 MG/ML
250 INJECTION, SOLUTION INTRAVENOUS EVERY 24 HOURS
Status: DISCONTINUED | OUTPATIENT
Start: 2023-01-01 | End: 2023-01-01

## 2023-01-01 RX ORDER — PANTOPRAZOLE SODIUM 40 MG/10ML
40 INJECTION, POWDER, LYOPHILIZED, FOR SOLUTION INTRAVENOUS DAILY
Status: DISCONTINUED | OUTPATIENT
Start: 2023-01-01 | End: 2023-01-01 | Stop reason: HOSPADM

## 2023-01-01 RX ORDER — NOREPINEPHRINE BITARTRATE/D5W 8 MG/250ML
0-3.3 PLASTIC BAG, INJECTION (ML) INTRAVENOUS CONTINUOUS
Status: DISCONTINUED | OUTPATIENT
Start: 2023-01-01 | End: 2023-01-01

## 2023-01-01 RX ORDER — HYDROCHLOROTHIAZIDE 25 MG/1
25 TABLET ORAL DAILY
COMMUNITY

## 2023-01-01 RX ORDER — METRONIDAZOLE 500 MG/100ML
500 INJECTION, SOLUTION INTRAVENOUS EVERY 8 HOURS
Status: DISCONTINUED | OUTPATIENT
Start: 2023-01-01 | End: 2023-01-01 | Stop reason: HOSPADM

## 2023-01-01 RX ORDER — PANTOPRAZOLE SODIUM 40 MG/10ML
40 INJECTION, POWDER, LYOPHILIZED, FOR SOLUTION INTRAVENOUS
Status: DISCONTINUED | OUTPATIENT
Start: 2023-01-01 | End: 2023-01-01

## 2023-01-01 RX ORDER — FLUTICASONE PROPIONATE AND SALMETEROL 100; 50 UG/1; UG/1
POWDER RESPIRATORY (INHALATION)
Status: ON HOLD | COMMUNITY
End: 2023-01-01 | Stop reason: ENTERED-IN-ERROR

## 2023-01-01 RX ORDER — POTASSIUM CHLORIDE 29.8 MG/ML
40 INJECTION INTRAVENOUS ONCE
Status: COMPLETED | OUTPATIENT
Start: 2023-01-01 | End: 2023-01-01

## 2023-01-01 RX ORDER — LORAZEPAM 0.5 MG/1
0.5 TABLET ORAL EVERY 4 HOURS PRN
Status: DISCONTINUED | OUTPATIENT
Start: 2023-01-01 | End: 2023-01-01 | Stop reason: HOSPADM

## 2023-01-01 RX ORDER — PHENYLEPHRINE HCL IN 0.9% NACL 0.4MG/10ML
SYRINGE (ML) INTRAVENOUS
Status: COMPLETED
Start: 2023-01-01 | End: 2023-01-01

## 2023-01-01 RX ORDER — VANCOMYCIN HYDROCHLORIDE 750 MG/150ML
750 INJECTION, SOLUTION INTRAVENOUS EVERY 24 HOURS
Status: DISCONTINUED | OUTPATIENT
Start: 2023-01-01 | End: 2023-01-01

## 2023-01-01 RX ORDER — METOPROLOL TARTRATE 50 MG/1
1 TABLET ORAL DAILY
Status: ON HOLD | COMMUNITY
End: 2023-01-01 | Stop reason: ENTERED-IN-ERROR

## 2023-01-01 RX ORDER — FENTANYL CITRATE-0.9 % NACL/PF 10 MCG/ML
0-300 PLASTIC BAG, INJECTION (ML) INTRAVENOUS CONTINUOUS
Status: DISCONTINUED | OUTPATIENT
Start: 2023-01-01 | End: 2023-01-01

## 2023-01-01 RX ORDER — ALBUTEROL SULFATE 90 UG/1
2 AEROSOL, METERED RESPIRATORY (INHALATION) EVERY 6 HOURS PRN
COMMUNITY
End: 2023-01-01 | Stop reason: ENTERED-IN-ERROR

## 2023-01-01 RX ORDER — ONDANSETRON 4 MG/1
4 TABLET, FILM COATED ORAL EVERY 8 HOURS PRN
Status: DISCONTINUED | OUTPATIENT
Start: 2023-01-01 | End: 2023-01-01

## 2023-01-01 RX ORDER — METOPROLOL TARTRATE 100 MG/1
50 TABLET ORAL EVERY MORNING
COMMUNITY

## 2023-01-01 RX ORDER — MAGNESIUM SULFATE HEPTAHYDRATE 40 MG/ML
2 INJECTION, SOLUTION INTRAVENOUS ONCE
Status: COMPLETED | OUTPATIENT
Start: 2023-01-01 | End: 2023-01-01

## 2023-01-01 RX ORDER — DEXMEDETOMIDINE HYDROCHLORIDE 4 UG/ML
INJECTION, SOLUTION INTRAVENOUS
Status: COMPLETED
Start: 2023-01-01 | End: 2023-01-01

## 2023-01-01 RX ORDER — METOPROLOL TARTRATE 50 MG/1
50 TABLET ORAL DAILY
Status: DISCONTINUED | OUTPATIENT
Start: 2023-01-01 | End: 2023-01-01 | Stop reason: HOSPADM

## 2023-01-01 RX ORDER — ROCURONIUM BROMIDE 10 MG/ML
INJECTION, SOLUTION INTRAVENOUS
Status: COMPLETED
Start: 2023-01-01 | End: 2023-01-01

## 2023-01-01 RX ORDER — FERROUS SULFATE, DRIED 160(50) MG
1 TABLET, EXTENDED RELEASE ORAL DAILY
COMMUNITY
End: 2023-01-01 | Stop reason: ENTERED-IN-ERROR

## 2023-01-01 RX ORDER — VANCOMYCIN HYDROCHLORIDE 1 G/200ML
1000 INJECTION, SOLUTION INTRAVENOUS ONCE
Status: COMPLETED | OUTPATIENT
Start: 2023-01-01 | End: 2023-01-01

## 2023-01-01 RX ORDER — FENTANYL CITRATE-0.9 % NACL/PF 10 MCG/ML
25-200 PLASTIC BAG, INJECTION (ML) INTRAVENOUS CONTINUOUS
Status: DISCONTINUED | OUTPATIENT
Start: 2023-01-01 | End: 2023-01-01 | Stop reason: HOSPADM

## 2023-01-01 RX ORDER — POLYETHYLENE GLYCOL 3350 17 G/17G
17 POWDER, FOR SOLUTION ORAL DAILY PRN
Status: DISCONTINUED | OUTPATIENT
Start: 2023-01-01 | End: 2023-01-01 | Stop reason: HOSPADM

## 2023-01-01 RX ORDER — VANCOMYCIN HYDROCHLORIDE 750 MG/150ML
750 INJECTION, SOLUTION INTRAVENOUS ONCE
Status: COMPLETED | OUTPATIENT
Start: 2023-01-01 | End: 2023-01-01

## 2023-01-01 RX ORDER — ETOMIDATE 2 MG/ML
40 INJECTION INTRAVENOUS ONCE
Status: COMPLETED | OUTPATIENT
Start: 2023-01-01 | End: 2023-01-01

## 2023-01-01 RX ORDER — TRAMADOL HYDROCHLORIDE 50 MG/1
50 TABLET ORAL EVERY 6 HOURS PRN
Status: DISCONTINUED | OUTPATIENT
Start: 2023-01-01 | End: 2023-01-01

## 2023-01-01 RX ORDER — METOPROLOL TARTRATE 50 MG/1
50 TABLET ORAL DAILY
COMMUNITY
End: 2023-01-01

## 2023-01-01 RX ORDER — MIDAZOLAM HYDROCHLORIDE 1 MG/ML
INJECTION, SOLUTION INTRAMUSCULAR; INTRAVENOUS
Status: COMPLETED
Start: 2023-01-01 | End: 2023-01-01

## 2023-01-01 RX ORDER — HEPARIN SODIUM 5000 [USP'U]/ML
5000 INJECTION, SOLUTION INTRAVENOUS; SUBCUTANEOUS EVERY 8 HOURS
Status: DISCONTINUED | OUTPATIENT
Start: 2023-01-01 | End: 2023-01-01 | Stop reason: HOSPADM

## 2023-01-01 RX ORDER — OLANZAPINE 5 MG/1
2.5 TABLET ORAL EVERY 6 HOURS PRN
Status: DISCONTINUED | OUTPATIENT
Start: 2023-01-01 | End: 2023-01-01 | Stop reason: HOSPADM

## 2023-01-01 RX ORDER — SODIUM CHLORIDE, SODIUM LACTATE, POTASSIUM CHLORIDE, CALCIUM CHLORIDE 600; 310; 30; 20 MG/100ML; MG/100ML; MG/100ML; MG/100ML
75 INJECTION, SOLUTION INTRAVENOUS CONTINUOUS
Status: DISCONTINUED | OUTPATIENT
Start: 2023-01-01 | End: 2023-01-01 | Stop reason: HOSPADM

## 2023-01-01 RX ORDER — MORPHINE SULFATE 2 MG/ML
INJECTION, SOLUTION INTRAMUSCULAR; INTRAVENOUS
Status: COMPLETED
Start: 2023-01-01 | End: 2023-01-01

## 2023-01-01 RX ORDER — DEXMEDETOMIDINE HYDROCHLORIDE 4 UG/ML
.1-1.5 INJECTION, SOLUTION INTRAVENOUS CONTINUOUS
Status: DISCONTINUED | OUTPATIENT
Start: 2023-01-01 | End: 2023-01-01 | Stop reason: HOSPADM

## 2023-01-01 RX ORDER — OLANZAPINE 10 MG/2ML
5 INJECTION, POWDER, FOR SOLUTION INTRAMUSCULAR EVERY 4 HOURS PRN
Status: DISCONTINUED | OUTPATIENT
Start: 2023-01-01 | End: 2023-01-01 | Stop reason: HOSPADM

## 2023-01-01 RX ORDER — GLYCOPYRROLATE 0.2 MG/ML
0.2 INJECTION INTRAMUSCULAR; INTRAVENOUS EVERY 4 HOURS PRN
Status: DISCONTINUED | OUTPATIENT
Start: 2023-01-01 | End: 2023-01-01 | Stop reason: HOSPADM

## 2023-01-01 RX ORDER — FERROUS SULFATE, DRIED 160(50) MG
TABLET, EXTENDED RELEASE ORAL
Status: ON HOLD | COMMUNITY
End: 2023-01-01 | Stop reason: ENTERED-IN-ERROR

## 2023-01-01 RX ORDER — NOREPINEPHRINE BITARTRATE/D5W 8 MG/250ML
.01-1 PLASTIC BAG, INJECTION (ML) INTRAVENOUS CONTINUOUS
Status: DISCONTINUED | OUTPATIENT
Start: 2023-01-01 | End: 2023-01-01

## 2023-01-01 RX ORDER — POLYETHYLENE GLYCOL 3350 17 G/17G
POWDER, FOR SOLUTION ORAL
Status: COMPLETED
Start: 2023-01-01 | End: 2023-01-01

## 2023-01-01 RX ORDER — DEXTROSE MONOHYDRATE 100 MG/ML
50 INJECTION, SOLUTION INTRAVENOUS CONTINUOUS
Status: DISCONTINUED | OUTPATIENT
Start: 2023-01-01 | End: 2023-01-01

## 2023-01-01 RX ORDER — MORPHINE SULFATE 2 MG/ML
1 INJECTION, SOLUTION INTRAMUSCULAR; INTRAVENOUS
Status: DISCONTINUED | OUTPATIENT
Start: 2023-01-01 | End: 2023-01-01 | Stop reason: HOSPADM

## 2023-01-01 RX ADMIN — Medication: at 14:49

## 2023-01-01 RX ADMIN — GLYCOPYRROLATE 0.2 MG: 0.2 INJECTION INTRAMUSCULAR; INTRAVENOUS at 02:29

## 2023-01-01 RX ADMIN — POTASSIUM CHLORIDE 40 MEQ: 1.5 POWDER, FOR SOLUTION ORAL at 11:13

## 2023-01-01 RX ADMIN — DEXMEDETOMIDINE HYDROCHLORIDE 0.25 MCG/KG/HR: 4 INJECTION, SOLUTION INTRAVENOUS at 00:36

## 2023-01-01 RX ADMIN — PIPERACILLIN SODIUM AND TAZOBACTAM SODIUM 3.38 G: 3; .375 INJECTION, SOLUTION INTRAVENOUS at 20:26

## 2023-01-01 RX ADMIN — Medication 100 MCG/HR: at 00:35

## 2023-01-01 RX ADMIN — PANTOPRAZOLE SODIUM 40 MG: 40 INJECTION, POWDER, FOR SOLUTION INTRAVENOUS at 08:39

## 2023-01-01 RX ADMIN — PIPERACILLIN SODIUM AND TAZOBACTAM SODIUM 3.38 G: 3; .375 INJECTION, SOLUTION INTRAVENOUS at 07:47

## 2023-01-01 RX ADMIN — HEPARIN SODIUM 5000 UNITS: 5000 INJECTION INTRAVENOUS; SUBCUTANEOUS at 21:45

## 2023-01-01 RX ADMIN — LACTULOSE 30 G: 20 SOLUTION ORAL at 04:49

## 2023-01-01 RX ADMIN — SODIUM CHLORIDE, POTASSIUM CHLORIDE, SODIUM LACTATE AND CALCIUM CHLORIDE 500 ML: 600; 310; 30; 20 INJECTION, SOLUTION INTRAVENOUS at 02:27

## 2023-01-01 RX ADMIN — SODIUM CHLORIDE, POTASSIUM CHLORIDE, SODIUM LACTATE AND CALCIUM CHLORIDE 500 ML: 600; 310; 30; 20 INJECTION, SOLUTION INTRAVENOUS at 12:02

## 2023-01-01 RX ADMIN — PANTOPRAZOLE SODIUM 40 MG: 40 INJECTION, POWDER, FOR SOLUTION INTRAVENOUS at 10:23

## 2023-01-01 RX ADMIN — SODIUM CHLORIDE 50 MG: 9 INJECTION, SOLUTION INTRAVENOUS at 07:57

## 2023-01-01 RX ADMIN — PIPERACILLIN SODIUM AND TAZOBACTAM SODIUM 3.38 G: 3; .375 INJECTION, SOLUTION INTRAVENOUS at 20:04

## 2023-01-01 RX ADMIN — ROCURONIUM 100 MG: 50 INJECTION, SOLUTION INTRAVENOUS at 22:24

## 2023-01-01 RX ADMIN — DEXTROSE MONOHYDRATE 0.04 G/KG/HR: 100 INJECTION, SOLUTION INTRAVENOUS at 14:48

## 2023-01-01 RX ADMIN — NOREPINEPHRINE BITARTRATE 0.06 MCG/KG/MIN: 8 INJECTION, SOLUTION INTRAVENOUS at 21:39

## 2023-01-01 RX ADMIN — PIPERACILLIN SODIUM AND TAZOBACTAM SODIUM 3.38 G: 3; .375 INJECTION, SOLUTION INTRAVENOUS at 07:13

## 2023-01-01 RX ADMIN — CEFTRIAXONE SODIUM 1 G: 1 INJECTION, SOLUTION INTRAVENOUS at 13:05

## 2023-01-01 RX ADMIN — Medication 1 TABLET: at 10:23

## 2023-01-01 RX ADMIN — SODIUM CHLORIDE 50 MG: 9 INJECTION, SOLUTION INTRAVENOUS at 02:06

## 2023-01-01 RX ADMIN — Medication 6 L/MIN: at 17:40

## 2023-01-01 RX ADMIN — ATORVASTATIN CALCIUM 10 MG: 10 TABLET ORAL at 20:08

## 2023-01-01 RX ADMIN — MORPHINE SULFATE 5 MG: 10 SOLUTION ORAL at 02:25

## 2023-01-01 RX ADMIN — SODIUM CHLORIDE 50 MG: 9 INJECTION, SOLUTION INTRAVENOUS at 19:49

## 2023-01-01 RX ADMIN — DEXTROSE MONOHYDRATE 0.04 G/KG/HR: 100 INJECTION, SOLUTION INTRAVENOUS at 07:47

## 2023-01-01 RX ADMIN — CLINDAMYCIN PHOSPHATE 900 MG: 900 INJECTION, SOLUTION INTRAVENOUS at 20:31

## 2023-01-01 RX ADMIN — POTASSIUM CHLORIDE AND SODIUM CHLORIDE 50 ML/HR: 900; 300 INJECTION, SOLUTION INTRAVENOUS at 20:12

## 2023-01-01 RX ADMIN — LORAZEPAM 0.5 MG: 0.5 TABLET ORAL at 15:18

## 2023-01-01 RX ADMIN — OLANZAPINE 5 MG: 10 INJECTION, POWDER, FOR SOLUTION INTRAMUSCULAR at 15:45

## 2023-01-01 RX ADMIN — POLYETHYLENE GLYCOL 3350 17 G: 17 POWDER, FOR SOLUTION ORAL at 07:57

## 2023-01-01 RX ADMIN — DEXTROSE MONOHYDRATE 50 ML/HR: 100 INJECTION, SOLUTION INTRAVENOUS at 17:39

## 2023-01-01 RX ADMIN — MAGNESIUM SULFATE HEPTAHYDRATE 2 G: 40 INJECTION, SOLUTION INTRAVENOUS at 07:24

## 2023-01-01 RX ADMIN — MORPHINE SULFATE 5 MG: 10 SOLUTION ORAL at 00:58

## 2023-01-01 RX ADMIN — SODIUM CHLORIDE, SODIUM LACTATE, POTASSIUM CHLORIDE, AND CALCIUM CHLORIDE 75 ML/HR: 600; 310; 30; 20 INJECTION, SOLUTION INTRAVENOUS at 02:24

## 2023-01-01 RX ADMIN — HEPARIN SODIUM 5000 UNITS: 5000 INJECTION INTRAVENOUS; SUBCUTANEOUS at 21:41

## 2023-01-01 RX ADMIN — NOREPINEPHRINE BITARTRATE 0.01 MCG/KG/MIN: 8 INJECTION, SOLUTION INTRAVENOUS at 00:45

## 2023-01-01 RX ADMIN — MORPHINE SULFATE 5 MG: 10 SOLUTION ORAL at 00:12

## 2023-01-01 RX ADMIN — PROPOFOL 10 MCG/KG/MIN: 10 INJECTION, EMULSION INTRAVENOUS at 22:00

## 2023-01-01 RX ADMIN — FENTANYL CITRATE 100 MCG: 50 INJECTION, SOLUTION INTRAMUSCULAR; INTRAVENOUS at 22:23

## 2023-01-01 RX ADMIN — DEXMEDETOMIDINE HYDROCHLORIDE 0.2 MCG/KG/HR: 400 INJECTION INTRAVENOUS at 08:25

## 2023-01-01 RX ADMIN — GLYCOPYRROLATE 0.2 MG: 0.2 INJECTION INTRAMUSCULAR; INTRAVENOUS at 06:15

## 2023-01-01 RX ADMIN — ALBUTEROL SULFATE 2.5 MG: 2.5 SOLUTION RESPIRATORY (INHALATION) at 10:50

## 2023-01-01 RX ADMIN — LEVOFLOXACIN 500 MG: 500 INJECTION, SOLUTION INTRAVENOUS at 05:04

## 2023-01-01 RX ADMIN — Medication: at 12:00

## 2023-01-01 RX ADMIN — HEPARIN SODIUM 5000 UNITS: 5000 INJECTION INTRAVENOUS; SUBCUTANEOUS at 13:15

## 2023-01-01 RX ADMIN — VANCOMYCIN HYDROCHLORIDE 750 MG: 750 INJECTION, SOLUTION INTRAVENOUS at 08:43

## 2023-01-01 RX ADMIN — INSULIN LISPRO 1 UNITS: 100 INJECTION, SOLUTION INTRAVENOUS; SUBCUTANEOUS at 17:43

## 2023-01-01 RX ADMIN — VASOPRESSIN 0.03 UNITS/MIN: 0.2 INJECTION INTRAVENOUS at 14:15

## 2023-01-01 RX ADMIN — ATORVASTATIN CALCIUM 10 MG: 10 TABLET ORAL at 20:32

## 2023-01-01 RX ADMIN — GLYCOPYRROLATE 0.2 MG: 0.2 INJECTION INTRAMUSCULAR; INTRAVENOUS at 01:00

## 2023-01-01 RX ADMIN — HEPARIN SODIUM 5000 UNITS: 5000 INJECTION INTRAVENOUS; SUBCUTANEOUS at 17:43

## 2023-01-01 RX ADMIN — PANTOPRAZOLE SODIUM 40 MG: 40 INJECTION, POWDER, FOR SOLUTION INTRAVENOUS at 07:57

## 2023-01-01 RX ADMIN — POTASSIUM CHLORIDE 40 MEQ: 29.8 INJECTION, SOLUTION INTRAVENOUS at 07:48

## 2023-01-01 RX ADMIN — Medication 0.06 MCG/KG/MIN: at 22:30

## 2023-01-01 RX ADMIN — Medication 1 TABLET: at 18:54

## 2023-01-01 RX ADMIN — HEPARIN SODIUM 5000 UNITS: 5000 INJECTION INTRAVENOUS; SUBCUTANEOUS at 13:59

## 2023-01-01 RX ADMIN — HEPARIN SODIUM 5000 UNITS: 5000 INJECTION INTRAVENOUS; SUBCUTANEOUS at 02:24

## 2023-01-01 RX ADMIN — POTASSIUM PHOSPHATE, MONOBASIC POTASSIUM PHOSPHATE, DIBASIC 21 MMOL: 224; 236 INJECTION, SOLUTION, CONCENTRATE INTRAVENOUS at 10:26

## 2023-01-01 RX ADMIN — DEXMEDETOMIDINE HYDROCHLORIDE 0.25 MCG/KG/HR: 400 INJECTION INTRAVENOUS at 00:36

## 2023-01-01 RX ADMIN — Medication 30 L/MIN: at 20:29

## 2023-01-01 RX ADMIN — POTASSIUM CHLORIDE 40 MEQ: 29.8 INJECTION, SOLUTION INTRAVENOUS at 02:07

## 2023-01-01 RX ADMIN — PIPERACILLIN SODIUM AND TAZOBACTAM SODIUM 3.38 G: 3; .375 INJECTION, SOLUTION INTRAVENOUS at 02:07

## 2023-01-01 RX ADMIN — PROPOFOL 20 MCG/KG/MIN: 10 INJECTION, EMULSION INTRAVENOUS at 07:22

## 2023-01-01 RX ADMIN — VANCOMYCIN HYDROCHLORIDE 1500 MG: 1.5 INJECTION, POWDER, LYOPHILIZED, FOR SOLUTION INTRAVENOUS at 10:30

## 2023-01-01 RX ADMIN — PIPERACILLIN SODIUM AND TAZOBACTAM SODIUM 3.38 G: 3; .375 INJECTION, SOLUTION INTRAVENOUS at 21:01

## 2023-01-01 RX ADMIN — CEFEPIME 1 G: 1 INJECTION, POWDER, FOR SOLUTION INTRAMUSCULAR; INTRAVENOUS at 04:49

## 2023-01-01 RX ADMIN — VANCOMYCIN HYDROCHLORIDE 1000 MG: 1 INJECTION, SOLUTION INTRAVENOUS at 06:28

## 2023-01-01 RX ADMIN — ESOMEPRAZOLE MAGNESIUM 20 MG: 20 FOR SUSPENSION ORAL at 13:21

## 2023-01-01 RX ADMIN — PIPERACILLIN SODIUM AND TAZOBACTAM SODIUM 3.38 G: 3; .375 INJECTION, SOLUTION INTRAVENOUS at 02:00

## 2023-01-01 RX ADMIN — POLYETHYLENE GLYCOL 3350 17 G: 17 POWDER, FOR SOLUTION ORAL at 10:23

## 2023-01-01 RX ADMIN — AZITHROMYCIN MONOHYDRATE 500 MG: 500 INJECTION, POWDER, LYOPHILIZED, FOR SOLUTION INTRAVENOUS at 13:05

## 2023-01-01 RX ADMIN — POLYETHYLENE GLYCOL 3350 17 G: 17 POWDER, FOR SOLUTION ORAL at 14:24

## 2023-01-01 RX ADMIN — HEPARIN SODIUM 5000 UNITS: 5000 INJECTION INTRAVENOUS; SUBCUTANEOUS at 05:31

## 2023-01-01 RX ADMIN — PIPERACILLIN SODIUM AND TAZOBACTAM SODIUM 3.38 G: 3; .375 INJECTION, SOLUTION INTRAVENOUS at 02:05

## 2023-01-01 RX ADMIN — SODIUM CHLORIDE, SODIUM LACTATE, POTASSIUM CHLORIDE, AND CALCIUM CHLORIDE 75 ML/HR: 600; 310; 30; 20 INJECTION, SOLUTION INTRAVENOUS at 19:00

## 2023-01-01 RX ADMIN — DEXTROSE MONOHYDRATE 50 ML/HR: 100 INJECTION, SOLUTION INTRAVENOUS at 05:49

## 2023-01-01 RX ADMIN — VASOPRESSIN 0.03 UNITS/MIN: 0.2 INJECTION INTRAVENOUS at 21:39

## 2023-01-01 RX ADMIN — ETOMIDATE 10 MG: 2 INJECTION INTRAVENOUS at 01:33

## 2023-01-01 RX ADMIN — METRONIDAZOLE 500 MG: 500 INJECTION, SOLUTION INTRAVENOUS at 11:43

## 2023-01-01 RX ADMIN — MIDAZOLAM 2 MG: 1 INJECTION INTRAMUSCULAR; INTRAVENOUS at 15:39

## 2023-01-01 RX ADMIN — DEXTROSE MONOHYDRATE 50 ML/HR: 100 INJECTION, SOLUTION INTRAVENOUS at 13:12

## 2023-01-01 RX ADMIN — Medication 50 MCG/HR: at 05:31

## 2023-01-01 RX ADMIN — MORPHINE SULFATE 5 MG: 10 SOLUTION ORAL at 21:29

## 2023-01-01 RX ADMIN — Medication 120 MCG: at 22:25

## 2023-01-01 RX ADMIN — CALCIUM CARBONATE-VITAMIN D TAB 500 MG-200 UNIT 1 TABLET: 500-200 TAB at 10:23

## 2023-01-01 RX ADMIN — HEPARIN SODIUM 5000 UNITS: 5000 INJECTION INTRAVENOUS; SUBCUTANEOUS at 17:57

## 2023-01-01 RX ADMIN — MAGNESIUM SULFATE HEPTAHYDRATE 2 G: 40 INJECTION, SOLUTION INTRAVENOUS at 02:07

## 2023-01-01 RX ADMIN — MORPHINE SULFATE 1 MG: 2 INJECTION, SOLUTION INTRAMUSCULAR; INTRAVENOUS at 17:40

## 2023-01-01 RX ADMIN — NOREPINEPHRINE BITARTRATE 0.02 MCG/KG/MIN: 8 INJECTION, SOLUTION INTRAVENOUS at 00:39

## 2023-01-01 RX ADMIN — PIPERACILLIN SODIUM AND TAZOBACTAM SODIUM 3.38 G: 3; .375 INJECTION, SOLUTION INTRAVENOUS at 13:09

## 2023-01-01 RX ADMIN — ETOMIDATE 20 MG: 2 INJECTION INTRAVENOUS at 22:23

## 2023-01-01 RX ADMIN — MIDAZOLAM HYDROCHLORIDE 2 MG: 1 INJECTION, SOLUTION INTRAMUSCULAR; INTRAVENOUS at 15:39

## 2023-01-01 RX ADMIN — Medication 50 MCG/HR: at 00:43

## 2023-01-01 RX ADMIN — PIPERACILLIN SODIUM AND TAZOBACTAM SODIUM 3.38 G: 3; .375 INJECTION, SOLUTION INTRAVENOUS at 08:39

## 2023-01-01 RX ADMIN — DEXTROSE MONOHYDRATE 50 ML/HR: 100 INJECTION, SOLUTION INTRAVENOUS at 22:58

## 2023-01-01 RX ADMIN — METHYLPREDNISOLONE SODIUM SUCCINATE 40 MG: 40 INJECTION, POWDER, FOR SOLUTION INTRAMUSCULAR; INTRAVENOUS at 04:49

## 2023-01-01 RX ADMIN — CLINDAMYCIN PHOSPHATE 900 MG: 900 INJECTION, SOLUTION INTRAVENOUS at 04:50

## 2023-01-01 RX ADMIN — SODIUM CHLORIDE, POTASSIUM CHLORIDE, SODIUM LACTATE AND CALCIUM CHLORIDE 500 ML: 600; 310; 30; 20 INJECTION, SOLUTION INTRAVENOUS at 12:20

## 2023-01-01 RX ADMIN — ROCURONIUM BROMIDE 100 MG: 10 INJECTION, SOLUTION INTRAVENOUS at 22:24

## 2023-01-01 RX ADMIN — Medication 50 MCG/HR: at 10:32

## 2023-01-01 RX ADMIN — PIPERACILLIN SODIUM AND TAZOBACTAM SODIUM 3.38 G: 3; .375 INJECTION, SOLUTION INTRAVENOUS at 07:57

## 2023-01-01 RX ADMIN — HEPARIN SODIUM 5000 UNITS: 5000 INJECTION INTRAVENOUS; SUBCUTANEOUS at 13:01

## 2023-01-01 RX ADMIN — HEPARIN SODIUM 5000 UNITS: 5000 INJECTION INTRAVENOUS; SUBCUTANEOUS at 05:45

## 2023-01-01 RX ADMIN — METOPROLOL TARTRATE 50 MG: 50 TABLET, FILM COATED ORAL at 18:54

## 2023-01-01 RX ADMIN — SUCCINYLCHOLINE CHLORIDE 50 MG: 20 INJECTION INTRAMUSCULAR; INTRAVENOUS at 01:34

## 2023-01-01 RX ADMIN — HEPARIN SODIUM 5000 UNITS: 5000 INJECTION INTRAVENOUS; SUBCUTANEOUS at 01:43

## 2023-01-01 RX ADMIN — Medication 75 MCG/HR: at 07:27

## 2023-01-01 RX ADMIN — MORPHINE SULFATE 1 MG: 2 INJECTION, SOLUTION INTRAMUSCULAR; INTRAVENOUS at 18:47

## 2023-01-01 RX ADMIN — PIPERACILLIN SODIUM AND TAZOBACTAM SODIUM 3.38 G: 3; .375 INJECTION, SOLUTION INTRAVENOUS at 13:15

## 2023-01-01 RX ADMIN — METRONIDAZOLE 500 MG: 500 INJECTION, SOLUTION INTRAVENOUS at 17:46

## 2023-01-01 RX ADMIN — FENTANYL CITRATE 25 MCG/HR: 0.05 INJECTION, SOLUTION INTRAMUSCULAR; INTRAVENOUS at 01:55

## 2023-01-01 RX ADMIN — HYDROCORTISONE SODIUM SUCCINATE 50 MG: 100 INJECTION, POWDER, FOR SOLUTION INTRAMUSCULAR; INTRAVENOUS at 15:14

## 2023-01-01 RX ADMIN — POLYETHYLENE GLYCOL 3350 17 G: 17 POWDER, FOR SOLUTION ORAL at 17:57

## 2023-01-01 RX ADMIN — FENTANYL CITRATE 50 MCG/HR: 0.05 INJECTION, SOLUTION INTRAMUSCULAR; INTRAVENOUS at 14:55

## 2023-01-01 RX ADMIN — POTASSIUM CHLORIDE 40 MEQ: 29.8 INJECTION, SOLUTION INTRAVENOUS at 13:01

## 2023-01-01 RX ADMIN — VANCOMYCIN HYDROCHLORIDE 750 MG: 750 INJECTION, SOLUTION INTRAVENOUS at 10:31

## 2023-01-01 RX ADMIN — ETOMIDATE INJECTION 20 MG: 2 SOLUTION INTRAVENOUS at 22:23

## 2023-01-01 RX ADMIN — GLYCOPYRROLATE 0.2 MG: 0.2 INJECTION INTRAMUSCULAR; INTRAVENOUS at 10:00

## 2023-01-01 RX ADMIN — ALBUTEROL SULFATE 2.5 MG: 2.5 SOLUTION RESPIRATORY (INHALATION) at 20:11

## 2023-01-01 RX ADMIN — SODIUM CHLORIDE 50 MG: 9 INJECTION, SOLUTION INTRAVENOUS at 08:42

## 2023-01-01 RX ADMIN — ETOMIDATE 10 MG: 20 INJECTION, SOLUTION INTRAVENOUS at 01:33

## 2023-01-01 RX ADMIN — VASOPRESSIN 0.03 UNITS/MIN: 0.2 INJECTION INTRAVENOUS at 00:37

## 2023-01-01 RX ADMIN — HEPARIN SODIUM 5000 UNITS: 5000 INJECTION INTRAVENOUS; SUBCUTANEOUS at 00:33

## 2023-01-01 RX ADMIN — FLUTICASONE FUROATE AND VILANTEROL TRIFENATATE 1 PUFF: 100; 25 POWDER RESPIRATORY (INHALATION) at 20:04

## 2023-01-01 RX ADMIN — SODIUM CHLORIDE, POTASSIUM CHLORIDE, SODIUM LACTATE AND CALCIUM CHLORIDE 500 ML: 600; 310; 30; 20 INJECTION, SOLUTION INTRAVENOUS at 22:28

## 2023-01-01 RX ADMIN — SODIUM CHLORIDE, POTASSIUM CHLORIDE, SODIUM LACTATE AND CALCIUM CHLORIDE 1000 ML: 600; 310; 30; 20 INJECTION, SOLUTION INTRAVENOUS at 01:32

## 2023-01-01 RX ADMIN — HYDROCORTISONE SODIUM SUCCINATE 50 MG: 100 INJECTION, POWDER, FOR SOLUTION INTRAMUSCULAR; INTRAVENOUS at 20:31

## 2023-01-01 RX ADMIN — PIPERACILLIN SODIUM AND TAZOBACTAM SODIUM 3.38 G: 3; .375 INJECTION, SOLUTION INTRAVENOUS at 19:49

## 2023-01-01 RX ADMIN — POTASSIUM CHLORIDE 40 MEQ: 29.8 INJECTION, SOLUTION INTRAVENOUS at 07:13

## 2023-01-01 RX ADMIN — HEPARIN SODIUM 5000 UNITS: 5000 INJECTION INTRAVENOUS; SUBCUTANEOUS at 05:49

## 2023-01-01 RX ADMIN — SUCCINYLCHOLINE CHLORIDE 50 MG: 20 INJECTION, SOLUTION INTRAMUSCULAR; INTRAVENOUS at 01:34

## 2023-01-01 RX ADMIN — SODIUM CHLORIDE, POTASSIUM CHLORIDE, SODIUM LACTATE AND CALCIUM CHLORIDE 500 ML: 600; 310; 30; 20 INJECTION, SOLUTION INTRAVENOUS at 16:53

## 2023-01-01 RX ADMIN — CLINDAMYCIN PHOSPHATE 900 MG: 900 INJECTION, SOLUTION INTRAVENOUS at 13:10

## 2023-01-01 RX ADMIN — PIPERACILLIN SODIUM AND TAZOBACTAM SODIUM 3.38 G: 3; .375 INJECTION, SOLUTION INTRAVENOUS at 13:59

## 2023-01-01 RX ADMIN — HEPARIN SODIUM 5000 UNITS: 5000 INJECTION INTRAVENOUS; SUBCUTANEOUS at 10:24

## 2023-01-01 RX ADMIN — SODIUM CHLORIDE 50 MG: 9 INJECTION, SOLUTION INTRAVENOUS at 14:23

## 2023-01-01 RX ADMIN — ALBUTEROL SULFATE 2.5 MG: 2.5 SOLUTION RESPIRATORY (INHALATION) at 15:08

## 2023-01-01 RX ADMIN — SODIUM CHLORIDE 3 G: 900 INJECTION INTRAVENOUS at 01:55

## 2023-01-01 RX ADMIN — SODIUM CHLORIDE, SODIUM LACTATE, POTASSIUM CHLORIDE, AND CALCIUM CHLORIDE 500 ML: 600; 310; 30; 20 INJECTION, SOLUTION INTRAVENOUS at 22:27

## 2023-01-01 RX ADMIN — CALCIUM CARBONATE-VITAMIN D TAB 500 MG-200 UNIT 1 TABLET: 500-200 TAB at 18:54

## 2023-01-01 RX ADMIN — HEPARIN SODIUM 5000 UNITS: 5000 INJECTION INTRAVENOUS; SUBCUTANEOUS at 14:23

## 2023-01-01 RX ADMIN — PIPERACILLIN SODIUM AND TAZOBACTAM SODIUM 3.38 G: 3; .375 INJECTION, SOLUTION INTRAVENOUS at 14:23

## 2023-01-01 RX ADMIN — MORPHINE SULFATE 1 MG: 2 INJECTION, SOLUTION INTRAMUSCULAR; INTRAVENOUS at 15:22

## 2023-01-01 SDOH — SOCIAL STABILITY: SOCIAL INSECURITY: ARE THERE ANY APPARENT SIGNS OF INJURIES/BEHAVIORS THAT COULD BE RELATED TO ABUSE/NEGLECT?: NO

## 2023-01-01 SDOH — SOCIAL STABILITY: SOCIAL INSECURITY: DO YOU FEEL UNSAFE GOING BACK TO THE PLACE WHERE YOU ARE LIVING?: UNABLE TO ASSESS

## 2023-01-01 SDOH — SOCIAL STABILITY: SOCIAL INSECURITY: WITHIN THE LAST YEAR, HAVE YOU BEEN HUMILIATED OR EMOTIONALLY ABUSED IN OTHER WAYS BY YOUR PARTNER OR EX-PARTNER?: NO

## 2023-01-01 SDOH — SOCIAL STABILITY: SOCIAL INSECURITY: DOES ANYONE TRY TO KEEP YOU FROM HAVING/CONTACTING OTHER FRIENDS OR DOING THINGS OUTSIDE YOUR HOME?: UNABLE TO ASSESS

## 2023-01-01 SDOH — ECONOMIC STABILITY: FOOD INSECURITY: WITHIN THE PAST 12 MONTHS, YOU WORRIED THAT YOUR FOOD WOULD RUN OUT BEFORE YOU GOT MONEY TO BUY MORE.: NEVER TRUE

## 2023-01-01 SDOH — SOCIAL STABILITY: SOCIAL INSECURITY: DO YOU FEEL UNSAFE GOING BACK TO THE PLACE WHERE YOU ARE LIVING?: NO

## 2023-01-01 SDOH — ECONOMIC STABILITY: TRANSPORTATION INSECURITY
IN THE PAST 12 MONTHS, HAS LACK OF TRANSPORTATION KEPT YOU FROM MEETINGS, WORK, OR FROM GETTING THINGS NEEDED FOR DAILY LIVING?: NO

## 2023-01-01 SDOH — SOCIAL STABILITY: SOCIAL INSECURITY: HAS ANYONE EVER THREATENED TO HURT YOUR FAMILY OR YOUR PETS?: UNABLE TO ASSESS

## 2023-01-01 SDOH — SOCIAL STABILITY: SOCIAL INSECURITY
WITHIN THE LAST YEAR, HAVE TO BEEN RAPED OR FORCED TO HAVE ANY KIND OF SEXUAL ACTIVITY BY YOUR PARTNER OR EX-PARTNER?: NO

## 2023-01-01 SDOH — ECONOMIC STABILITY: FOOD INSECURITY: WITHIN THE PAST 12 MONTHS, THE FOOD YOU BOUGHT JUST DIDN'T LAST AND YOU DIDN'T HAVE MONEY TO GET MORE.: NEVER TRUE

## 2023-01-01 SDOH — SOCIAL STABILITY: SOCIAL INSECURITY: POSSIBLE ABUSE REPORTED TO:: ADVOCATE

## 2023-01-01 SDOH — ECONOMIC STABILITY: TRANSPORTATION INSECURITY
IN THE PAST 12 MONTHS, HAS THE LACK OF TRANSPORTATION KEPT YOU FROM MEDICAL APPOINTMENTS OR FROM GETTING MEDICATIONS?: NO

## 2023-01-01 SDOH — ECONOMIC STABILITY: INCOME INSECURITY: IN THE LAST 12 MONTHS, WAS THERE A TIME WHEN YOU WERE NOT ABLE TO PAY THE MORTGAGE OR RENT ON TIME?: NO

## 2023-01-01 SDOH — HEALTH STABILITY: MENTAL HEALTH: EXPERIENCED ANY OF THE FOLLOWING LIFE EVENTS: DEATH OF FAMILY/FRIEND

## 2023-01-01 SDOH — SOCIAL STABILITY: SOCIAL INSECURITY: ARE YOU OR HAVE YOU BEEN THREATENED OR ABUSED PHYSICALLY, EMOTIONALLY, OR SEXUALLY BY ANYONE?: NO

## 2023-01-01 SDOH — SOCIAL STABILITY: SOCIAL INSECURITY: ABUSE: ADULT

## 2023-01-01 SDOH — ECONOMIC STABILITY: HOUSING INSECURITY
IN THE LAST 12 MONTHS, WAS THERE A TIME WHEN YOU DID NOT HAVE A STEADY PLACE TO SLEEP OR SLEPT IN A SHELTER (INCLUDING NOW)?: NO

## 2023-01-01 SDOH — SOCIAL STABILITY: SOCIAL INSECURITY: WITHIN THE LAST YEAR, HAVE YOU BEEN AFRAID OF YOUR PARTNER OR EX-PARTNER?: NO

## 2023-01-01 SDOH — SOCIAL STABILITY: SOCIAL INSECURITY: HAS ANYONE EVER THREATENED TO HURT YOUR FAMILY OR YOUR PETS?: NO

## 2023-01-01 SDOH — SOCIAL STABILITY: SOCIAL INSECURITY: DO YOU FEEL ANYONE HAS EXPLOITED OR TAKEN ADVANTAGE OF YOU FINANCIALLY OR OF YOUR PERSONAL PROPERTY?: UNABLE TO ASSESS

## 2023-01-01 SDOH — SOCIAL STABILITY: SOCIAL INSECURITY
WITHIN THE LAST YEAR, HAVE YOU BEEN KICKED, HIT, SLAPPED, OR OTHERWISE PHYSICALLY HURT BY YOUR PARTNER OR EX-PARTNER?: NO

## 2023-01-01 SDOH — ECONOMIC STABILITY: HOUSING INSECURITY: IN THE LAST 12 MONTHS, HOW MANY PLACES HAVE YOU LIVED?: 1

## 2023-01-01 SDOH — ECONOMIC STABILITY: INCOME INSECURITY: HOW HARD IS IT FOR YOU TO PAY FOR THE VERY BASICS LIKE FOOD, HOUSING, MEDICAL CARE, AND HEATING?: NOT HARD AT ALL

## 2023-01-01 SDOH — SOCIAL STABILITY: SOCIAL INSECURITY: DO YOU FEEL ANYONE HAS EXPLOITED OR TAKEN ADVANTAGE OF YOU FINANCIALLY OR OF YOUR PERSONAL PROPERTY?: NO

## 2023-01-01 SDOH — ECONOMIC STABILITY: INCOME INSECURITY: IN THE PAST 12 MONTHS, HAS THE ELECTRIC, GAS, OIL, OR WATER COMPANY THREATENED TO SHUT OFF SERVICE IN YOUR HOME?: NO

## 2023-01-01 SDOH — SOCIAL STABILITY: SOCIAL INSECURITY: HAVE YOU HAD THOUGHTS OF HARMING ANYONE ELSE?: UNABLE TO ASSESS

## 2023-01-01 SDOH — SOCIAL STABILITY: SOCIAL INSECURITY: HAVE YOU HAD THOUGHTS OF HARMING ANYONE ELSE?: NO

## 2023-01-01 SDOH — SOCIAL STABILITY: SOCIAL INSECURITY: ARE YOU OR HAVE YOU BEEN THREATENED OR ABUSED PHYSICALLY, EMOTIONALLY, OR SEXUALLY BY ANYONE?: UNABLE TO ASSESS

## 2023-01-01 SDOH — SOCIAL STABILITY: SOCIAL INSECURITY: WERE YOU ABLE TO COMPLETE ALL THE BEHAVIORAL HEALTH SCREENINGS?: NO

## 2023-01-01 SDOH — SOCIAL STABILITY: SOCIAL INSECURITY: DOES ANYONE TRY TO KEEP YOU FROM HAVING/CONTACTING OTHER FRIENDS OR DOING THINGS OUTSIDE YOUR HOME?: NO

## 2023-01-01 SDOH — SOCIAL STABILITY: SOCIAL INSECURITY: WERE YOU ABLE TO COMPLETE ALL THE BEHAVIORAL HEALTH SCREENINGS?: YES

## 2023-01-01 SDOH — SOCIAL STABILITY: SOCIAL INSECURITY: ARE THERE ANY APPARENT SIGNS OF INJURIES/BEHAVIORS THAT COULD BE RELATED TO ABUSE/NEGLECT?: UNABLE TO ASSESS

## 2023-01-01 ASSESSMENT — LIFESTYLE VARIABLES
REASON UNABLE TO ASSESS: NO
SUBSTANCE_ABUSE_PAST_12_MONTHS: NO
EVER HAD A DRINK FIRST THING IN THE MORNING TO STEADY YOUR NERVES TO GET RID OF A HANGOVER: NO
HOW OFTEN DO YOU HAVE 6 OR MORE DRINKS ON ONE OCCASION: NEVER
HOW OFTEN DO YOU HAVE A DRINK CONTAINING ALCOHOL: NEVER
HOW MANY STANDARD DRINKS CONTAINING ALCOHOL DO YOU HAVE ON A TYPICAL DAY: PATIENT DECLINED
AUDIT-C TOTAL SCORE: 0
EVER FELT BAD OR GUILTY ABOUT YOUR DRINKING: NO
AUDIT-C TOTAL SCORE: 0
PRESCIPTION_ABUSE_PAST_12_MONTHS: NO
HAVE YOU EVER FELT YOU SHOULD CUT DOWN ON YOUR DRINKING: NO
HOW OFTEN DO YOU HAVE A DRINK CONTAINING ALCOHOL: PATIENT DECLINED
HAVE PEOPLE ANNOYED YOU BY CRITICIZING YOUR DRINKING: NO
AUDIT-C TOTAL SCORE: -1
HOW MANY STANDARD DRINKS CONTAINING ALCOHOL DO YOU HAVE ON A TYPICAL DAY: PATIENT DOES NOT DRINK
SKIP TO QUESTIONS 9-10: 1
SKIP TO QUESTIONS 9-10: 0
AUDIT-C TOTAL SCORE: -1
HOW OFTEN DO YOU HAVE 6 OR MORE DRINKS ON ONE OCCASION: PATIENT DECLINED

## 2023-01-01 ASSESSMENT — COLUMBIA-SUICIDE SEVERITY RATING SCALE - C-SSRS
1. IN THE PAST MONTH, HAVE YOU WISHED YOU WERE DEAD OR WISHED YOU COULD GO TO SLEEP AND NOT WAKE UP?: NO
2. HAVE YOU ACTUALLY HAD ANY THOUGHTS OF KILLING YOURSELF?: NO
5. HAVE YOU STARTED TO WORK OUT OR WORKED OUT THE DETAILS OF HOW TO KILL YOURSELF? DO YOU INTEND TO CARRY OUT THIS PLAN?: NO
2. HAVE YOU ACTUALLY HAD ANY THOUGHTS OF KILLING YOURSELF?: NO
1. IN THE PAST MONTH, HAVE YOU WISHED YOU WERE DEAD OR WISHED YOU COULD GO TO SLEEP AND NOT WAKE UP?: NO
6. HAVE YOU EVER DONE ANYTHING, STARTED TO DO ANYTHING, OR PREPARED TO DO ANYTHING TO END YOUR LIFE?: NO
4. HAVE YOU HAD THESE THOUGHTS AND HAD SOME INTENTION OF ACTING ON THEM?: NO
1. IN THE PAST MONTH, HAVE YOU WISHED YOU WERE DEAD OR WISHED YOU COULD GO TO SLEEP AND NOT WAKE UP?: NO
6. HAVE YOU EVER DONE ANYTHING, STARTED TO DO ANYTHING, OR PREPARED TO DO ANYTHING TO END YOUR LIFE?: NO
2. HAVE YOU ACTUALLY HAD ANY THOUGHTS OF KILLING YOURSELF?: NO

## 2023-01-01 ASSESSMENT — COGNITIVE AND FUNCTIONAL STATUS - GENERAL
DAILY ACTIVITIY SCORE: 15
MOBILITY SCORE: 6
PERSONAL GROOMING: A LITTLE
STANDING UP FROM CHAIR USING ARMS: TOTAL
TOILETING: A LOT
DRESSING REGULAR UPPER BODY CLOTHING: A LITTLE
WALKING IN HOSPITAL ROOM: TOTAL
HELP NEEDED FOR BATHING: A LOT
TOILETING: A LOT
DRESSING REGULAR LOWER BODY CLOTHING: TOTAL
PERSONAL GROOMING: A LITTLE
MOVING TO AND FROM BED TO CHAIR: TOTAL
PATIENT BASELINE BEDBOUND: YES
STANDING UP FROM CHAIR USING ARMS: TOTAL
WALKING IN HOSPITAL ROOM: TOTAL
CLIMB 3 TO 5 STEPS WITH RAILING: TOTAL
TOILETING: A LOT
MOVING FROM LYING ON BACK TO SITTING ON SIDE OF FLAT BED WITH BEDRAILS: TOTAL
DAILY ACTIVITIY SCORE: 15
TURNING FROM BACK TO SIDE WHILE IN FLAT BAD: TOTAL
MOVING FROM LYING ON BACK TO SITTING ON SIDE OF FLAT BED WITH BEDRAILS: TOTAL
MOVING FROM LYING ON BACK TO SITTING ON SIDE OF FLAT BED WITH BEDRAILS: TOTAL
CLIMB 3 TO 5 STEPS WITH RAILING: TOTAL
MOBILITY SCORE: 6
DAILY ACTIVITIY SCORE: 15
STANDING UP FROM CHAIR USING ARMS: TOTAL
TURNING FROM BACK TO SIDE WHILE IN FLAT BAD: TOTAL
HELP NEEDED FOR BATHING: A LOT
MOVING TO AND FROM BED TO CHAIR: TOTAL
HELP NEEDED FOR BATHING: A LOT
MOBILITY SCORE: 6
MOVING TO AND FROM BED TO CHAIR: TOTAL
DRESSING REGULAR UPPER BODY CLOTHING: A LITTLE
DRESSING REGULAR LOWER BODY CLOTHING: TOTAL
PATIENT BASELINE BEDBOUND: UNABLE TO ASSESS AT THIS TIME
DRESSING REGULAR UPPER BODY CLOTHING: A LITTLE
WALKING IN HOSPITAL ROOM: TOTAL
DRESSING REGULAR LOWER BODY CLOTHING: TOTAL
PERSONAL GROOMING: A LITTLE
CLIMB 3 TO 5 STEPS WITH RAILING: TOTAL
TURNING FROM BACK TO SIDE WHILE IN FLAT BAD: TOTAL

## 2023-01-01 ASSESSMENT — ACTIVITIES OF DAILY LIVING (ADL)
DRESSING YOURSELF: UNABLE TO ASSESS
TOILETING: UNABLE TO ASSESS
GROOMING: NEEDS ASSISTANCE
LACK_OF_TRANSPORTATION: NO
LACK_OF_TRANSPORTATION: NO
HEARING - LEFT EAR: UNABLE TO ASSESS
TOILETING: NEEDS ASSISTANCE
FEEDING YOURSELF: UNABLE TO ASSESS
LACK_OF_TRANSPORTATION: PATIENT DECLINED
ADL_ASSISTANCE: INDEPENDENT
WALKS IN HOME: UNABLE TO ASSESS
BATHING: NEEDS ASSISTANCE
ADEQUATE_TO_COMPLETE_ADL: UNABLE TO ASSESS
WALKS IN HOME: NEEDS ASSISTANCE
JUDGMENT_ADEQUATE_SAFELY_COMPLETE_DAILY_ACTIVITIES: NO
LACK_OF_TRANSPORTATION: PATIENT DECLINED
BATHING_ASSISTANCE: MODERATE
ADL_ASSISTANCE: INDEPENDENT
FEEDING YOURSELF: NEEDS ASSISTANCE
PATIENT'S MEMORY ADEQUATE TO SAFELY COMPLETE DAILY ACTIVITIES?: NO
BATHING: UNABLE TO ASSESS
JUDGMENT_ADEQUATE_SAFELY_COMPLETE_DAILY_ACTIVITIES: UNABLE TO ASSESS
HEARING - LEFT EAR: UNABLE TO ASSESS
HEARING - RIGHT EAR: UNABLE TO ASSESS
PATIENT'S MEMORY ADEQUATE TO SAFELY COMPLETE DAILY ACTIVITIES?: UNABLE TO ASSESS
ADEQUATE_TO_COMPLETE_ADL: NO
GROOMING: UNABLE TO ASSESS
DRESSING YOURSELF: NEEDS ASSISTANCE
HEARING - RIGHT EAR: UNABLE TO ASSESS

## 2023-01-01 ASSESSMENT — PATIENT HEALTH QUESTIONNAIRE - PHQ9
SUM OF ALL RESPONSES TO PHQ9 QUESTIONS 1 & 2: 0
2. FEELING DOWN, DEPRESSED OR HOPELESS: NOT AT ALL
1. LITTLE INTEREST OR PLEASURE IN DOING THINGS: NOT AT ALL
SUM OF ALL RESPONSES TO PHQ9 QUESTIONS 1 & 2: 0
1. LITTLE INTEREST OR PLEASURE IN DOING THINGS: NOT AT ALL
2. FEELING DOWN, DEPRESSED OR HOPELESS: NOT AT ALL

## 2023-01-01 ASSESSMENT — PAIN SCALES - GENERAL
PAINLEVEL_OUTOF10: 7
PAINLEVEL_OUTOF10: 0 - NO PAIN
PAINLEVEL_OUTOF10: 6
PAINLEVEL_OUTOF10: 4
PAINLEVEL_OUTOF10: 6
PAINLEVEL_OUTOF10: 2
PAINLEVEL_OUTOF10: 0 - NO PAIN
PAINLEVEL_OUTOF10: 7
PAINLEVEL_OUTOF10: 0 - NO PAIN
PAINLEVEL_OUTOF10: 7
PAINLEVEL_OUTOF10: 0 - NO PAIN
PAINLEVEL_OUTOF10: 7
PAINLEVEL_OUTOF10: 0 - NO PAIN
PAINLEVEL_OUTOF10: 6
PAINLEVEL_OUTOF10: 0 - NO PAIN
PAINLEVEL_OUTOF10: 5 - MODERATE PAIN

## 2023-01-01 ASSESSMENT — PAIN - FUNCTIONAL ASSESSMENT
PAIN_FUNCTIONAL_ASSESSMENT: 0-10
PAIN_FUNCTIONAL_ASSESSMENT: CPOT (CRITICAL CARE PAIN OBSERVATION TOOL)
PAIN_FUNCTIONAL_ASSESSMENT: 0-10
PAIN_FUNCTIONAL_ASSESSMENT: CPOT (CRITICAL CARE PAIN OBSERVATION TOOL)
PAIN_FUNCTIONAL_ASSESSMENT: 0-10
PAIN_FUNCTIONAL_ASSESSMENT: CPOT (CRITICAL CARE PAIN OBSERVATION TOOL)
PAIN_FUNCTIONAL_ASSESSMENT: CPOT (CRITICAL CARE PAIN OBSERVATION TOOL)
PAIN_FUNCTIONAL_ASSESSMENT: 0-10
PAIN_FUNCTIONAL_ASSESSMENT: CPOT (CRITICAL CARE PAIN OBSERVATION TOOL)
PAIN_FUNCTIONAL_ASSESSMENT: 0-10
PAIN_FUNCTIONAL_ASSESSMENT: CPOT (CRITICAL CARE PAIN OBSERVATION TOOL)
PAIN_FUNCTIONAL_ASSESSMENT: 0-10
PAIN_FUNCTIONAL_ASSESSMENT: 0-10
PAIN_FUNCTIONAL_ASSESSMENT: CPOT (CRITICAL CARE PAIN OBSERVATION TOOL)
PAIN_FUNCTIONAL_ASSESSMENT: 0-10
PAIN_FUNCTIONAL_ASSESSMENT: CPOT (CRITICAL CARE PAIN OBSERVATION TOOL)

## 2023-01-01 ASSESSMENT — PAIN DESCRIPTION - PAIN TYPE: TYPE: ACUTE PAIN

## 2023-01-20 PROBLEM — S72.90XA FRACTURE, FEMUR (MULTI): Status: ACTIVE | Noted: 2023-01-01

## 2023-01-20 PROBLEM — K80.20 CHOLELITHIASIS: Status: ACTIVE | Noted: 2023-01-01

## 2023-01-20 PROBLEM — L03.90 CELLULITIS: Status: ACTIVE | Noted: 2023-01-01

## 2023-01-20 PROBLEM — R19.7 LIQUID STOOL: Status: ACTIVE | Noted: 2023-01-01

## 2023-01-20 PROBLEM — K80.50 COMMON BILE DUCT STONE: Status: ACTIVE | Noted: 2023-01-01

## 2023-01-20 PROBLEM — E78.5 HYPERLIPEMIA: Status: ACTIVE | Noted: 2023-01-01

## 2023-01-20 PROBLEM — H61.23 BILATERAL IMPACTED CERUMEN: Status: ACTIVE | Noted: 2023-01-01

## 2023-01-20 PROBLEM — I73.9 PVD (PERIPHERAL VASCULAR DISEASE) (CMS-HCC): Status: ACTIVE | Noted: 2023-01-01

## 2023-01-20 PROBLEM — Z93.1 GASTROSTOMY TUBE IN PLACE (MULTI): Status: ACTIVE | Noted: 2023-01-01

## 2023-01-20 PROBLEM — I10 HTN (HYPERTENSION): Status: ACTIVE | Noted: 2023-01-01

## 2023-01-20 PROBLEM — K85.90 ACUTE PANCREATITIS (HHS-HCC): Status: ACTIVE | Noted: 2023-01-01

## 2023-01-20 PROBLEM — R13.10 DIFFICULTY SWALLOWING: Status: ACTIVE | Noted: 2023-01-01

## 2023-01-21 PROBLEM — J44.9 COPD (CHRONIC OBSTRUCTIVE PULMONARY DISEASE) (MULTI): Status: ACTIVE | Noted: 2023-01-01

## 2023-01-23 PROBLEM — E78.5 HYPERLIPIDEMIA: Status: ACTIVE | Noted: 2023-01-01

## 2023-01-23 PROBLEM — K80.50 CHOLEDOCHOLITHIASIS: Status: ACTIVE | Noted: 2023-01-01

## 2023-04-12 NOTE — PROGRESS NOTES
Subjective   Patient ID: Daniel Robb is a 82 y.o. male who presents for COPD, Hypertension, and Hyperlipidemia.    HPI  Presents today for above reason    Last eye exam: unknown  Last dental: long time  Pt tries to follow low fat/sugar/salt diet  Pt has decreased appetite  Denies SOB/chest pain/palpitations  Pt is not a smoker  No urinary or bowel issues per pt.  Sleeping well  Medications working well    Pt would like to discuss supplement for weight  Refused weight today    Pt states COPD is not better or worse.    Paperwork given to pt by Remington Sheridan to be filled out      Review of Systems  Constitutional:  no chills, no fever and no night sweats.  Eyes: no blurred vision and no eyesight problems.  ENT: no hearing loss, no nasal congestion, no hoarseness and no sore throat.  Neck: no mass (es) and no swelling.  Cardiovascular: no chest pain, no intermittent leg claudication, no lower extremity edema, no palpitation and no syncope.  Respiratory: no cough, no shortness of breath during exertion, no shortness of breath at rest and no wheezing.  Gastrointestinal: no abdominal pain, no blood in stools, no constipation, no diarrhea, no melena, no nausea, no rectal pain and no vomiting.  Genitourinary: no dysuria, no change in urinary frequency, no urinary hesitancy and no feelings of urinary urgency.  Musculoskeletal: no arthralgias, no back pain and no myalgias.  Integumentary: no new skin lesions and no rashes.  Neurological: no difficulty walking, no headache, no limb weakness, no numbness and no tingling.  Psychiatric/Behavioral: no anxiety, no depression, no anhedonia and no substance use disorders.  Endocrine: no recent weight gain and no recent weight loss.  Hematologic/Lymphatic: no tendency for easy bruising and no swollen glands    Objective   Physical Exam  Patient here for follow-up from [facility] continues to and not put on weight states she is eating okay we talked about increased  calorie intake he has not been getting a boost or Ensure with every meal we will written an order for that to be returned also recommend that he get increased calorie snacks in between mealtimes and I requested nutritionist to contact me.  Overall his breathing is better history of COPD lungs clear with some scattered rhonchi but overall good air entry cardiac and abdominal exams benign no peripheral edema.  He is weak and fairly well wheelchair-bound.  Needs help transferring.  Cannot stand for weight here in the office last only have his over 2 years old.  /56   Pulse 66   Temp 36.5 °C (97.7 °F)   Resp 16   Ht 1.829 m (6')   SpO2 93%   BMI 19.26 kg/m² hypertension hypothyroidism hyperlipidemia.  Anemia    Lab Results   Component Value Date    WBC 13.4 (H) 09/27/2022    HGB 11.9 (L) 09/27/2022    HCT 38.3 (L) 09/27/2022    MCV 95 09/27/2022     09/27/2022       Assessment/Plan plan follow-up with him in the office for weight recheck in 3 months he is doing we written an order that at [facility] again I need to get him on a chair where they can get a weight on him.  Problem List Items Addressed This Visit    None

## 2023-11-27 NOTE — TELEPHONE ENCOUNTER
TRIED TO CALL THE HOME NUMBER LISTED - JUST [facility] [employer] [facility]  NO ONE IS LISTED ON CURRENT HIPAA  TRIED TO CALL PHILIPPE BACK AT NUMBER LISTED - RINGS BUSY.

## 2023-11-27 NOTE — TELEPHONE ENCOUNTER
Britni from [facility] is calling to request an order from you to move him from assisted living to home care. He's no longer to stay in assisted living because he has failure to thrive, he's not eating, not able to use his slide board and he's a two person assist.   Ok for home care order?    If ok, please fax to [phone]    [facility] phone # [phone]

## 2023-12-04 PROBLEM — J18.9 PNEUMONIA OF LEFT LUNG DUE TO INFECTIOUS ORGANISM, UNSPECIFIED PART OF LUNG: Status: ACTIVE | Noted: 2023-01-01

## 2023-12-04 NOTE — TELEPHONE ENCOUNTER
[facility]IL[facility] CALLING US BACK - JUST WANTED TO LET YOU KNOW THAT THEY DID SEND HIM OUT TO THE ER.

## 2023-12-04 NOTE — H&P
History Of Present Illness  Daniel Robb is a 83 y.o. male presenting with Shortness of breath and hypoxemia.  Patient has a past medical history that includes hypertension, COPD, peripheral vascular disease, hyperlipidemia, colon cancer status post colostomy.  Patient lives in [facility] and was complaining of pain in his right hip when palpating, EMS was called and his pulse ox was 85%.  Patient does complain of some shortness of breath that has been going on for a while but cannot tell me exactly since when, also complains of some chest pain but cannot specify or point out.  Days, denies any cough, trouble swallowing, fevers, chills, runny nose, sore throat, vomiting or diarrhea.  He does use an oxygen concentrator at night.  On arrival to the ER he has sinus tachycardia with a heart rate of 123, respiratory rate 22, blood pressure 124/61 and he was saturating 93% on room air.  Blood work showed leukocytosis with a white cell count of 38,000, metabolic panel showed a creatinine of 1.4  and a potassium of 3.3.  He was given ceftriaxone and azithromycin in the ER.  Chest x-ray showed small left-sided pleural effusion and left retrocardiac opacities secondary to aspiration or pneumonia, chronic interstitial and airspace opacities in the right lower lung..     Past Medical History  Past Medical History:   Diagnosis Date    Encounter for follow-up examination after completed treatment for conditions other than malignant neoplasm 10/02/2020    Hospital discharge follow-up    Encounter for immunization     Encounter for immunization    Gastrostomy status (CMS/[facility]) 06/23/2021    Gastrostomy tube in place    Personal history of other malignant neoplasm of rectum, rectosigmoid junction, and anus     History of rectal cancer    Personal history of urinary calculi     History of nephrolithiasis   Rest as per HPI    Surgical History  Past Surgical History:   Procedure Laterality Date    OTHER SURGICAL HISTORY  10/02/2020     "Tonsillectomy    OTHER SURGICAL HISTORY  10/02/2020    Colectomy    OTHER SURGICAL HISTORY  10/02/2020    Joint surgery        Social History  Former heavy smoker, quit a long time ago, no alcohol use    Family History  Noncontributory     Allergies  Patient has no known allergies.    Review of Systems  As per HPI, comprehensive review of systems performed  Physical Exam  Constitutional:       General: He is in acute distress.      Appearance: He is ill-appearing.   HENT:      Head: Normocephalic and atraumatic.      Mouth/Throat:      Mouth: Mucous membranes are dry.   Eyes:      Extraocular Movements: Extraocular movements intact.      Conjunctiva/sclera: Conjunctivae normal.   Cardiovascular:      Rate and Rhythm: Normal rate and regular rhythm.      Heart sounds: No murmur heard.  Pulmonary:      Comments: Tachypneic, in mild respiratory distress, decreased breathing sounds bilaterally  Abdominal:      General: There is no distension.      Palpations: Abdomen is soft.   Musculoskeletal:         General: No swelling or deformity.   Neurological:      Mental Status: Mental status is at baseline.   Psychiatric:         Mood and Affect: Mood normal.          Last Recorded Vitals  Blood pressure (!) 147/93, pulse 94, resp. rate 24, height 1.88 m (6' 2\"), weight 63.5 kg (140 lb), SpO2 95 %.    Relevant Results             Assessment/Plan   Principal Problem:    Pneumonia of left lung due to infectious organism, unspecified part of lung      This 83-year-old male with past medical history of COPD colon cancer status post colostomy?  Hypertension, peripheral vascular disease, hyperlipidemia admitted with acute hypoxemia, possible aspiration pneumonia leukocytosis, hypokalemia, CHARY and severe protein calorie malnutrition    Start the patient on Zosyn and azithromycin to cover for aspiration and community-acquired pneumonia  Will order a speech and swallow eval  Procalcitonin level  Currently he seems to be in respiratory " distress we will put him on Airvo  DuoNebs  Wean as tolerated  Follow leukocytosis  Will check a C. difficile as well due to abnormally high white cell count he does have a colostomy  Gentle IV fluid hydration with potassium supplementation for CHARY and hypokalemia  Repeat BMP tomorrow  Consult dietitian for severe protein calorie malnutrition  Resume home meds once med rec is done  Once his creatinine improves we will check a CTA to rule out PE  X-ray of the right hip due to pain  DVT prophylaxis             Otto Painting MD

## 2023-12-04 NOTE — PROGRESS NOTES
I was contacted by the nurse as pt was in respiratory distress on inquiring it seem like pt was never put on airvo despite myself asking the respiratory therapist on duty earlier to do so. Pt will be put on airvo, will be given 1 mg of iv morphine to help with breathing and distress relief, we will order an abg stat as well!

## 2023-12-04 NOTE — ED PROVIDER NOTES
HPI   No chief complaint on file.      Patient is an 83-year-old male with history of hypertension, COPD, peripheral vascular disease, hyperlipidemia who presents for hypoxia.  Patient was at [facility] and was complaining of diffuse pain when pivoting.  EMS was called for unclear reasons, though his pulse ox was 85%.  Patient states he has been eating less lately.  Denies any change in his sense of smell.  Endorses occasional shortness pain or shortness of breath, though does not denies any today.  Denies any fevers, chills, cough or nose, sore throat, vomiting, diarrhea.  Patient uses an oxygen concentrator at night, though does not know his settings.                           No data recorded                Patient History   Past Medical History:   Diagnosis Date    Encounter for follow-up examination after completed treatment for conditions other than malignant neoplasm 10/02/2020    Hospital discharge follow-up    Encounter for immunization     Encounter for immunization    Gastrostomy status (CMS/[facility]) 06/23/2021    Gastrostomy tube in place    Personal history of other malignant neoplasm of rectum, rectosigmoid junction, and anus     History of rectal cancer    Personal history of urinary calculi     History of nephrolithiasis     Past Surgical History:   Procedure Laterality Date    OTHER SURGICAL HISTORY  10/02/2020    Tonsillectomy    OTHER SURGICAL HISTORY  10/02/2020    Colectomy    OTHER SURGICAL HISTORY  10/02/2020    Joint surgery     No family history on file.  Social History     Tobacco Use    Smoking status: Not on file    Smokeless tobacco: Not on file   Substance Use Topics    Alcohol use: Not on file    Drug use: Not on file       Physical Exam   ED Triage Vitals   Temp Pulse Resp BP   -- -- -- --      SpO2 Temp src Heart Rate Source Patient Position   -- -- -- --      BP Location FiO2 (%)     -- --       Physical Exam  Constitutional:       General: He is not in acute distress.  HENT:      Head:  Normocephalic.   Eyes:      Extraocular Movements: Extraocular movements intact.      Conjunctiva/sclera: Conjunctivae normal.      Pupils: Pupils are equal, round, and reactive to light.   Cardiovascular:      Rate and Rhythm: Regular rhythm. Tachycardia present.      Pulses: Normal pulses.      Heart sounds: Normal heart sounds.   Pulmonary:      Effort: Pulmonary effort is normal.      Breath sounds: Rales (left anterior lung) present.   Abdominal:      General: There is no distension.      Palpations: Abdomen is soft. There is no mass.      Tenderness: There is no abdominal tenderness. There is no guarding.   Musculoskeletal:         General: No deformity.      Cervical back: Normal range of motion and neck supple.      Right lower leg: No edema.      Left lower leg: No edema.   Skin:     General: Skin is warm and dry.      Findings: Lesion (grade 2 ulcer over right sacrum, grade 1 midline sacral ulcer) present. No rash.   Neurological:      General: No focal deficit present.      Mental Status: He is alert and oriented to person, place, and time. Mental status is at baseline.      Cranial Nerves: No cranial nerve deficit.      Sensory: No sensory deficit.      Motor: No weakness.   Psychiatric:         Mood and Affect: Mood normal.         ED Course & MDM   Diagnoses as of 12/04/23 1333   Pneumonia of left lung due to infectious organism, unspecified part of lung   Sepsis, due to unspecified organism, unspecified whether acute organ dysfunction present (CMS/[facility])       Medical Decision Making  EKG on interpretation: Rate 120, rhythm irregular, axis normal, , , QTc 477, T waves: Inversion in V2/V3, ST segments: No elevations or depressions, dictation: Sinus tachycardia, PVCs versus fusion complexes, no STEMI    Patient is a 83-year-old male with above-stated past medical history presents for hypoxia.  Patient's chest x-ray shows signs of pneumonia consistent with physical exam and history.  Patient  was started on ceftriaxone and azithromycin in the emergency department.  Heart rate improved with a small fluid bolus.  Urinalysis is pending.  Sacral ulcers do not appear grossly infected, low suspicion that these are cause for his sepsis.  EKG is nonischemic.  He has a mild elevation in his troponin which I believe is likely secondary to his tachycardia.  Patient is a mild elevation in his creatinine versus his baseline, fluids were given.  Influenza and COVID are negative.  Patient is clinically well-appearing nontoxic low suspicion for dissection, Boerhaave's, cardiac tamponade, pulmonary embolism..   Given his infection as well as his new oxygen requirement, he was admitted to the medicine service for further management.    Disclaimer: This note was dictated using speech recognition software. Minor errors in transcription may be present. Please call if questions.     Gael Marroquin MD  [facility] Emergency Medicine  Contact on [employer]        Problems Addressed:  Pneumonia of left lung due to infectious organism, unspecified part of lung: acute illness or injury  Sepsis, due to unspecified organism, unspecified whether acute organ dysfunction present (CMS/[facility]): acute illness or injury    Amount and/or Complexity of Data Reviewed  Labs: ordered.  Radiology: ordered.  ECG/medicine tests: ordered and independent interpretation performed.        Procedure  Procedures     Gael Marroquin MD  12/04/23 8759

## 2023-12-04 NOTE — ED PROCEDURE NOTE
Procedure  Critical Care    Performed by: aGel Marroquin MD  Authorized by: Gael Marroquin MD    Critical care provider statement:     Critical care time (minutes):  31    Critical care time was exclusive of:  Separately billable procedures and treating other patients    Critical care was necessary to treat or prevent imminent or life-threatening deterioration of the following conditions:  Sepsis    Critical care was time spent personally by me on the following activities:  Evaluation of patient's response to treatment, examination of patient, obtaining history from patient or surrogate, ordering and performing treatments and interventions, ordering and review of laboratory studies, ordering and review of radiographic studies, pulse oximetry and re-evaluation of patient's condition    I assumed direction of critical care for this patient from another provider in my specialty: no      Care discussed with: admitting provider                 Gael Marroquin MD  12/04/23 [id number]

## 2023-12-04 NOTE — TELEPHONE ENCOUNTER
[facility] CALLED BACK.  I SPOKE TO PHILIPPE THE DIRECTOR.  THIS IS FOR SKILLED NURSING FACILITY AT [facility].  HE IS ON THE ASSISTANT SIDE RIGHT NOW AND THEY HAVE TO MOVE HIM TODAY OR SHE STATED SHE IS SENDING HIM TO THE HOSPITAL.    SHE WILL FAX THE REQUEST FOR REFERRAL OVER.  OK FOR REFERRAL FOR SKILLED?

## 2023-12-05 PROBLEM — J96.01 ACUTE HYPOXEMIC RESPIRATORY FAILURE (MULTI): Status: ACTIVE | Noted: 2023-01-01

## 2023-12-05 NOTE — PROGRESS NOTES
[facility] Critical Care Medicine       Date:  12/5/2023  Patient:  Daniel Robb  YOB: 1940  MRN:  [id number]   Admit Date:  12/4/2023  ========================================================================================================    No chief complaint on file.        History of Present Illness:  Daniel Robb is a 83 y.o. year old male patient with significant PMH listed below including chronic respiratory failure on 2 L nasal cannula as needed, COPD, colon cancer status post partial colectomy with end colostomy, dysphagia resulting in aspiration pneumonia, initially presented to the ED yesterday afternoon after patient was found at SNF complaining of diffuse pain when pivoting, although EMS was unsure whether they were called.  Apparently when they showed up SpO2 was 85% on room air.  Information in this document obtained from records review only as patient is currently intubated and sedated, unable to contact family at this time.  Admitted yesterday initially under hospital medicine for pneumonia as well as sepsis without septic shock.  Patient was started on azithromycin, Zosyn.  Initially hypokalemic which has been replaced.  No echo on file.      Interval ICU Events:  Rapid response in the middle of the night.  Patient was apparently on 15 L Oxymizer with SpO2 high 80s to 90%.  Patient was nonresponsive to verbal or painful stimuli.  Respirations were unlabored, obvious respiratory distress.  Patient was initially hypertensive.  Was placed on 100% nonrebreather.  Immediately transferred to ICU.  Attempted to call emergency contact, [place], on 2 different numbers however the first number was disconnected, message left at the second number.  ABGs were obtained when patient was on RMF, resulted when patient arrived to ICU, pH 7.12 with pCO2 118 and pO2 65 SO2 85.  Intubated with direct visualization without incident.  Patient became hypotensive, IV fluids started, started  norepinephrine.  Started on fentanyl drip for sedation.  Patient does appear quite dry, giving more fluids.  No documented prior echo or known history of heart failure.    AM ASSESSMENT:  Pt is alert, nodding appropriately and following commands, moves all extremities. Remains intubated on pressors.     Medical History:  Past Medical History:   Diagnosis Date    Acute on chronic respiratory failure (CMS/[facility])     Aspiration into respiratory tract     Asthma     Bile duct calculus     Choledocholithiasis 01/23/2023    Cholelithiasis 01/20/2023    COPD (chronic obstructive pulmonary disease) (CMS/[facility])     Dysphagia     HLD (hyperlipidemia)     Hypertension     On home oxygen therapy     PRN basis    Personal history of other malignant neoplasm of rectum, rectosigmoid junction, and anus     History of rectal cancer    Personal history of urinary calculi     History of nephrolithiasis    Protein calorie malnutrition (CMS/[facility])     PVD (peripheral vascular disease) (CMS/[facility]) 01/20/2023     Past Surgical History:   Procedure Laterality Date    BILE DUCT STENT PLACEMENT      COLECTOMY PARTIAL / TOTAL      Partial with end colostomy    ERCP      bile duct stent removal    JOINT REPLACEMENT Right     Hip    TONSILLECTOMY       Medications Prior to Admission   Medication Sig Dispense Refill Last Dose    acetaminophen (Tylenol) 325 mg tablet Take 2 tablets (650 mg) by mouth every 4 hours if needed for mild pain (1 - 3) or fever (temp greater than 38.0 C). Do Not Exceed 3 grams in 24 hours   Unknown    albuterol 108 (90 Base) MCG/ACT inhaler Inhale 2 puffs every 4 hours if needed for wheezing.       albuterol 2.5 mg /3 mL (0.083 %) nebulizer solution Take 3 mL (2.5 mg) by nebulization.       calcium carbonate-vitamin D3 500 mg-5 mcg (200 unit) tablet        fluticasone propion-salmeteroL (Advair Diskus) 100-50 mcg/dose diskus inhaler Inhale. Rinse mouth with water after use to reduce aftertaste and incidence of candidiasis. Do  not swallow.       guaiFENesin (Robitussin) 100 mg/5 mL syrup Take 10 mL (200 mg) by mouth every 4 hours if needed for cough.   Unknown    hydroCHLOROthiazide (HYDRODiuril) 25 mg tablet Take 1 tablet (25 mg) by mouth once daily.   12/4/2023 at am    ipratropium (Atrovent) 0.02 % nebulizer solution Take 2.5 mL (0.5 mg) by nebulization every 8 hours if needed for shortness of breath.   12/4/2023 at 1000    Lactobacillus acidophilus (ACIDOPHILUS ORAL) Take 1 tablet by mouth in the morning and at bedtime.       lovastatin (Mevacor) 40 mg tablet Take 1 tablet (40 mg) by mouth once daily at bedtime.   12/3/2023 at pm    metoprolol tartrate (Lopressor) 100 mg tablet Take 1 tablet (100 mg) by mouth once daily at bedtime.   12/3/2023 at pm    metoprolol tartrate (Lopressor) 100 mg tablet Take 0.5 tablets (50 mg) by mouth once daily in the morning.   12/4/2023 at am    metoprolol tartrate (Lopressor) 50 mg tablet Take 1 tablet by mouth once daily.       oxygen (O2) therapy Inhale 2 L/min at 120,000 mL/hr Every night at midnight.       tiotropium (Spiriva) 18 mcg inhalation capsule Place 1 capsule (18 mcg) into inhaler and inhale once daily.       traMADol (Ultram) 50 mg tablet Take 1 tablet (50 mg) by mouth every 6 hours if needed for severe pain (7 - 10).   Unknown     Patient has no known allergies.  Social History     Tobacco Use    Smoking status: Former     Types: Cigarettes     Passive exposure: Never    Smokeless tobacco: Never   Substance Use Topics    Alcohol use: Never    Drug use: Never     Family History   Problem Relation Name Age of Onset    Heart disease Mother      Heart disease Father         Hospital Medications:    dexmedeTOMIDine, 0.1-1.5 mcg/kg/hr, Last Rate: 0.2 mcg/kg/hr (12/05/23 0825)  fentaNYL,  mcg/hr, Last Rate: 50 mcg/hr (12/05/23 0800)  lactated Ringer's, 75 mL/hr, Last Rate: 75 mL/hr (12/05/23 0224)  norepinephrine, 0.01-1 mcg/kg/min, Last Rate: 0.07 mcg/kg/min (12/05/23  8820)          Current Facility-Administered Medications:     [DISCONTINUED] acetaminophen (Tylenol) tablet 650 mg, 650 mg, oral, q4h PRN **OR** acetaminophen (Tylenol) oral liquid 650 mg, 650 mg, nasogastric tube, q4h PRN **OR** [DISCONTINUED] acetaminophen (Tylenol) suppository 650 mg, 650 mg, rectal, q4h PRN, Qiana E Pentito, APRN-CNP    albuterol 2.5 mg /3 mL (0.083 %) nebulizer solution 2.5 mg, 2.5 mg, nebulization, q6h PRN, Qiana E Pentito, APRN-CNP    atorvastatin (Lipitor) tablet 10 mg, 10 mg, oral, Nightly, Qiana E Pentito, APRN-CNP, 10 mg at 12/04/23 2008    [Held by provider] benzocaine-menthol (Cepastat Sore Throat) 15-3.6 mg lozenge 1 lozenge, 1 lozenge, Mouth/Throat, q2h PRN, Qiana E Pentito, APRN-CNP    calcium carbonate-vitamin D3 500 mg-5 mcg (200 unit) per tablet 1 tablet, 1 tablet, oral, Daily, Qiana E Pentito, APRN-CNP, 1 tablet at 12/04/23 1854    cefepime (Maxipime) 1 g in dextrose 5 % 50 mL IV, 1 g, intravenous, q24h, Qiana E Pentito, APRN-CNP, Stopped at 12/05/23 0519    dexmedeTOMIDine in NS (Precedex) 400 mcg in 100 mL (4 mcg/mL) infusion, 0.1-1.5 mcg/kg/hr, intravenous, Continuous, Ru Reed, APRN-CNP, Last Rate: 2.77 mL/hr at 12/05/23 0825, 0.2 mcg/kg/hr at 12/05/23 0825    fentanyl (Sublimaze) 1000 mcg in sodium chloride 0.9% 100 mL (10 mcg/mL) infusion (premix),  mcg/hr, intravenous, Continuous, Qiana E Pentito, APRN-CNP, Last Rate: 5 mL/hr at 12/05/23 0800, 50 mcg/hr at 12/05/23 0800    fluticasone furoate-vilanteroL (Breo Ellipta) 100-25 mcg/dose inhaler 1 puff, 1 puff, inhalation, Daily, Qiana Mayer APRN-CNP, 1 puff at 12/04/23 2004    heparin (porcine) injection 5,000 Units, 5,000 Units, subcutaneous, q8h, Qiana Mayer, APRN-CNP, 5,000 Units at 12/05/23 0224    lactated Ringer's infusion, 75 mL/hr, intravenous, Continuous, Qiana Mayer APRN-CNP, Last Rate: 75 mL/hr at 12/05/23 0224, 75 mL/hr at 12/05/23 0224    lactobacillus acidophilus tablet 1  "tablet, 1 tablet, oral, Daily, Qiana E Pentito, APRN-CNP, 1 tablet at 12/04/23 1854    [START ON 12/6/2023] levoFLOXacin (Levaquin)  mg, 250 mg, intravenous, q24h, Qiana E Pentito, APRN-CNP    [START ON 12/6/2023] methylPREDNISolone sod succinate (PF) (SOLU-Medrol) 40 mg/mL injection 40 mg, 40 mg, intravenous, q24h, Qiana E Pentito, APRN-CNP    [Held by provider] metoprolol tartrate (Lopressor) tablet 50 mg, 50 mg, oral, Daily, Qiana E Pentito, APRN-CNP, 50 mg at 12/04/23 1854    norepinephrine (Levophed) 8 mg in dextrose 5% 250 mL (0.032 mg/mL) infusion (premix), 0.01-1 mcg/kg/min, intravenous, Continuous, Qiana E Pentito, APRN-CNP, Last Rate: 7.27 mL/hr at 12/05/23 0820, 0.07 mcg/kg/min at 12/05/23 0820    oxygen (O2) therapy, , inhalation, Continuous PRN - O2/gases, Qiana E Pentito, APRN-CNP, Rate Verify at 12/05/23 0739    pantoprazole (ProtoNix) injection 40 mg, 40 mg, intravenous, Daily, Qiana E Pentito, APRN-CNP    perflutren lipid microspheres (Definity) injection 0.5-10 mL of dilution, 0.5-10 mL of dilution, intravenous, Once in imaging, Qiana E Pentito, APRN-CNP    polyethylene glycol (Glycolax, Miralax) packet 17 g, 17 g, oral, Daily, Qiana E Pentito, APRN-CNP, 17 g at 12/04/23 1757    Review of Systems:  14 point review of systems was completed and negative except for those specially mention in my HPI    Physical Exam:    Heart Rate:  []   Temp:  [36.1 °C (97 °F)-36.6 °C (97.9 °F)]   Resp:  [17-38]   BP: ()/(44-93)   Height:  [188 cm (6' 2\")]   Weight:  [55.4 kg (122 lb 2.2 oz)-63.5 kg (140 lb)]   SpO2:  [80 %-100 %]     Physical Exam  Constitutional:       Appearance: He is ill-appearing.   HENT:      Mouth/Throat:      Mouth: Mucous membranes are dry.   Eyes:      Pupils: Pupils are equal, round, and reactive to light.   Neck:      Comments: (+) crepitus   Cardiovascular:      Rate and Rhythm: Normal rate and regular rhythm.      Pulses: Normal pulses.      Comments: On " pressors  Pulmonary:      Comments: Intubated; Diminished BLL, no adventitious lung sounds  Abdominal:      General: Abdomen is flat. Bowel sounds are normal.      Palpations: Abdomen is soft.   Genitourinary:     Comments: CHARY; making urine  Musculoskeletal:      Comments: Moves all extremities   Skin:     General: Skin is warm and dry.      Coloration: Skin is pale.   Neurological:      Comments: Awake, nodding appropriately and following commands upon initial assessment   Psychiatric:      Comments: CORINE       Objective:    I have reviewed all medications, laboratory results, and imaging pertinent for today's encounter.    Vent Mode: Volume control/assist control  FiO2 (%):  [50 %-100 %] 50 %  S RR:  [20] 20  S VT:  [450 mL] 450 mL  PEEP/CPAP (cm H2O):  [7 cm H20] 7 cm H20  MAP (cm H2O):  [16-20] 20      Intake/Output Summary (Last 24 hours) at 12/5/2023 0837  Last data filed at 12/5/2023 0800  Gross per 24 hour   Intake 2784 ml   Output 225 ml   Net 2559 ml         Assessment/Plan:    I am currently managing this critically ill patient for the following problems:  Acute on chronic respiratory failure with hypoxia and hypercapnia  CO2 narcosis  Septic shock  CAP requiring mechanical ventilation  UTI  R/O Aspiration pna  COPD w/ acute exacerbation  CHARY  FTT  Hyperammonemia     Neuro/Psych/Pain Ctrl/Sedation:  CO2 narcosis  Mechanical ventilation  -Titrate to RASS 0 to -2  -Fentanyl for sedation for now, if needed may need ketamine given BP fluctuations  -Frequent neurochecks, sedation vacation, wean from vent when able  -Provide ICU delirium postextubation     Respiratory/ENT:  Acute on chronic respiratory failure with hypoxia and hypercapnia  CAP requiring mechanical ventilation  COPD w/ acute exacerbation  -On 2 L nasal cannula as needed  -In with pneumonia, wheezing and rhonchorous, retaining CO2  -Pulmonary toilet  -ABG's prn, CXR prn to monitor for resolution  -Titrate FiO2 to maintain PO2 > 75 and SpO2 >  92%  -Solumedrol 40 mg bid x 4 days  -Nebulizers     Cardiovascular:  -No known history of CAD only HLD and HTN  -Currently hypotensive although appears to be more medication related than from true septic shock  -Vasopressor support as needed to maintain MAP > 65 mmHg for adequate perfusion  -Continuous cardiac monitoring  -Echo in AM (no Echo on file anywhere in records, dyspnea, hypotension, copd)  -Antihypertensives suspended  -1.5 L bolus administered overnight     GI:  Hyperammonemia  -Only slightly elevated at 78, suspect more CO2 involvement causing mental status change  -Lactulose 30 g x 1  -N.p.o.  -OG to LIWS     Renal/Volume Status (Intra & Extravascular)/Electrolytes:  CHARY  -Possible prior CKD 3 however prior renal function mostly normal  -Suspect multifactorial given dehydration as patient does appear volume depleted, poor nutritional status  -Replace volume, give 1 L LR followed by continuous at 75 an hour given unknown EF  -Avoid nephrotoxic drugs, strict intake and output  -Monitor replace electrolytes, keep potassium greater than 4 and magnesium greater than 2  -LR at 75cc/hr, continuous (will dc when TF are initiated)    Endocrine  -No history of diabetes or thyroid dysfunction  -Check TSH with reflex  -Monitor glucose levels with steroids, initiate SSI coverage if pt remains hyperglycemic  -Check HgbA1c     Infectious Disease:  Septic shock  UTI  R/O Aspiration pna  CAP  -Mild lactic acidosis most likely 2/2 hypoxia, hypotension possibly more medication induced however there is significant leukocytosis  -UA (+) for possible source, no prior (+) cx's noted  -History of aspiration pneumonia from dysphagia, evolving pneumonia on chest x-ray  -N.p.o. after extubation pending SLP, may need MBS  -Follow-up on cultures  -Given risk factors will cover with vancomycin, cefepime instead of Zosyn given renal function and Levaquin  -Check urine for Legionella and strep  -Check MRSA PCR, if negative  discontinue Vanco  -Check procalcitonin  -Add flagyl  -DC vanco - MRSA negative     Heme/Onc:  -Pretty significant leukocytosis, WBC is improving initially 38 now 29.3 with ANC 26, looks like pretty chronic leukocytosis     OBGYN/MSK:  FTT  -Social work for DC planning  -PT/OT when extubated  -SLP when extubated  -High risk for readmission     Ethics/Code Status:  Full code     :  DVT Prophylaxis: Heparin sq  GI Prophylaxis: Protonix  Bowel Regimen: Miralax            Diet: NPO  CVC: NA  [place]: NA  [place]ta: Inserted 12/5  Restraints: Yes, bilateral wrist, soft  Dispo: ICU    Critical Care Time:  60 min    Sabina Donohue, APRN-CNP

## 2023-12-05 NOTE — PROGRESS NOTES
12/05/23 1217   Discharge Planning   Living Arrangements Alone   Support Systems Children   Type of Residence Assisted living   Care Facility Name [facility] or [place]   Patient expects to be discharged to: [facility]   Does the patient need discharge transport arranged? Yes   RoundTrip coordination needed? Yes   Has discharge transport been arranged? No   Patient Choice   Provider Choice list and CMS website (https://medicare.gov/care-compare#search) for post-acute Quality and Resource Measure Data were provided and reviewed with: Family;Patient   Patient / Family choosing to utilize agency / facility established prior to hospitalization Yes     Pt now intubated, 50% FiO2, PEEP 7.    Base on address, pt is from [facility] or [place]. TCC placed call to [place] their facility liaison to confirm.

## 2023-12-05 NOTE — PROGRESS NOTES
Physical Therapy                 Therapy Communication Note    Patient Name: Daniel Robb  MRN: [id number]  Today's Date: 12/5/2023 640    Discipline: Physical Therapy    Missed Visit Reason: Missed Visit Reason:  (Note RR called last night. Patient intubated and transfered to ICU. Suggest reorder once patient can ACTIVELY  participate with PT.)

## 2023-12-05 NOTE — PROCEDURES
Radial Arterial Line Insertion    Indication: Monitor accurate blood pressure, patient was hypotensive    Catheter: 20g, 5cm    Procedure  The patient was prepped and draped in the normal sterile fashion.  The ultrasound probe was draped with a sterile probe cover. Using ultrasound guidance, the Radial artery was identified and cannulated.  There was good pulsatile flow through the needle.  A wire was then introduced into the artery.  The needle was removed and using the seldinger technique, the catheter was advanced over the wire.  The catheter was then secured with sutures and connected to the monitor with a good arterial waveform noted.  A dressing was applied.    Proctored by: Ru Reed    Complications: None      The patient tolerated the procedure well.    Yaneth Aparicio PA-C

## 2023-12-05 NOTE — PROCEDURES
CENTRAL LINE PLACEMENT     Indication(s):  -Inadequate IV access  -Administration of vasoactive or caustic agents  -CVP monitoring    Site:  - left internal jugular vein    Catheter: 8.5 Fr, 4 lumen, 20 cm radiopaque polyurethane    Sedation: fentanyl   Patient positioned supine +/- slight Trendelenburg.  Sterility: Chlorhexidine skin prep. Hat and mask on myself and assistant(s). Antiseptic hand foam. Sterile gown, gloves and drape.  Local anesthesia: 2 mL of 1% lidocaine subcutaneously.  Ultrasound-guided insertion:  -YES (with sterile ultrasound probe cover)    Seldinger technique with 18 Ga x 2.5 in introducer needle. Guidewire thread easily through introducer needle.  -Needle position confirmed to be intravenous by fall of blood to gravity within pressure transduction tubing.  -Guidewire position confirmed to be intravenous by visualization on ultrasound in short and long axis views.  Small skin incision adjacent to guidewire with #11 scalpel. 10 Fr tissue dilator over guidewire. Catheter thread easily over guidewire and guidewire removed. All ports aspirated for complete removal of air, then flushed with sterile NS.  Catheter secured with sutures @ 20 cm at skin.  -Biopatch and transparent film dressing.  -Transparent film dressing with CHG.  Sterility maintained throughout procedure. No complications. Patient tolerated well.    Chest x-ray  -done. Catheter in acceptable central venous position. No PTX. (My read).    Other details: None.    ARROW Quad-Lumen Central Venous Catheterization Kit  Lot # [id number]  Exp: 2024-06-01

## 2023-12-05 NOTE — PROGRESS NOTES
"Vancomycin Dosing by Pharmacy- INITIAL    Daniel Robb is a 83 y.o. year old male who Pharmacy has been consulted for vancomycin dosing for pneumonia. Based on the patient's indication and renal status this patient will be dosed based on a goal trough/random level of 15-20.     Renal function is currently declining.    Visit Vitals  BP 85/53   Pulse 70   Temp 36.4 °C (97.5 °F)   Resp 20        Lab Results   Component Value Date    CREATININE 1.72 (H) 12/05/2023    CREATININE 1.42 (H) 12/04/2023    CREATININE 1.40 (H) 12/04/2023    CREATININE 1.10 09/27/2022    CREATININE 1.04 09/26/2022    CREATININE 1.07 09/25/2022    CREATININE 1.05 09/24/2022        Patient weight is No results found for: \"PTWEIGHT\"    No results found for: \"CULTURE\"     I/O last 3 completed shifts:  In: 800 (14.4 mL/kg) [IV Piggyback:800]  Out: - (0 mL/kg)   Weight: 55.4 kg   [unfilled]    Lab Results   Component Value Date    PATIENTTEMP  12/05/2023      Comment:      NOTE: Patient Results are Not Corrected for Temperature    PATIENTTEMP  12/05/2023      Comment:      NOTE: Patient Results are Not Corrected for Temperature    PATIENTTEMP  12/04/2023      Comment:      NOTE: Patient Results are Not Corrected for Temperature          Assessment/Plan     Patient will not be given a loading dose.  Will initiate vancomycin maintenance, a one time dose of 1000 mg on 12/05/2023 at 0600.  Follow-up level will be ordered on 12/06/2023 at 0500 unless clinically indicated sooner.  Will continue to monitor renal function daily while on vancomycin and order serum creatinine at least every 48 hours if not already ordered.  Follow for continued vancomycin needs, clinical response, and signs/symptoms of toxicity.       ANTHONY Julio. Ph.        "

## 2023-12-05 NOTE — PROCEDURES
INTUBATION      Indication(s):  -Hypercapnic respiratory failure  -Hypoxemic respiratory failure     Induction agents:  -Etomidate 10 mg  -Succinylcholine 50 mg     Procedure:  Pt was on NRB with 100% FiO2. RSI performed without cricoid pressure. NRB removed and BMV performed with 100% O2 by ambu.  Induction agents as above. DL with Mac 3, grade I view. Atraumatic intubation with #7.5 ETT @ 26 cm @ lips. +EtCO2 color change. Breath sound equal bilaterally. Patient tolerated well. No complications.     Patient was in respiratory distress, unresponsive, listed as full code. Attempted to call [place], family member listed, no response x 2 attempts.    Laryngoscope: Direct M3  Size: 7.5   Location: 26 cm at the lip  CXR: ETT and OG in appropriate position

## 2023-12-05 NOTE — PROGRESS NOTES
Occupational Therapy                 Therapy Communication Note    Patient Name: Daniel Robb  MRN: [id number]  Today's Date: 12/5/2023     Discipline: Occupational Therapy    Missed Visit Reason: Missed Visit Reason:  Note RR called last night. Patient intubated and transfered to ICU. Suggest reorder once patient can ACTIVELY  participate with OT

## 2023-12-05 NOTE — PROGRESS NOTES
Patients colostomy pouch was not attached. Stoma red healthy and peristomal tissue intact. Washed peristomal tissue with soap and water. Applied Srinivasa 2 piece cut to fit with paste. Stama measures at 1 1/4. Extra supplies left at bedside.  Wound Care Progress Note     Visit Date: 12/5/2023      Patient Name: Daniel Robb         MRN: [id number]                Reason for Visit: Colostomy Check        Wound History:       Pertinent Labs:   Albumin   Date Value Ref Range Status   12/05/2023 3.1 (L) 3.4 - 5.0 g/dL Final           Wound Assessment:           NEW    NG/OG/Feeding Tube OG - [place] sump 16 Fr Center mouth (Active)   Placement Date/Time: 12/05/23 0100   Hand Hygiene Completed: Yes  Type of Tube: NG/OG Tube  Tube Length: 60 cm  Tube Type: OG - [place] sump  NG/OG Tube Size: 16 Fr  Tube Location: Center mouth   Number of days: 0       Colostomy LLQ (Active)   Earliest Known Present: 12/05/23   Location: LLQ   Number of days: 0       Urethral Catheter Double-lumen 16 Fr. (Active)   Placement Date/Time: 12/05/23 0130   Hand Hygiene Completed: Yes  Catheter Type: Double-lumen  Tube Size (Fr.): 16 Fr.  Catheter Balloon Size: 10 mL  Urine Returned: Yes   Number of days: 0     NG/OG/Feeding Tube OG - [place] sump 16 Fr Center mouth (Active)   Site Assessment Clean;Dry;Intact 12/05/23 0952       Colostomy LLQ (Active)   Site/Stoma Assessment Clean;Intact;Red 12/05/23 1247   Peristomal Assessment Clean;Intact 12/05/23 1247       Urethral Catheter Double-lumen 16 Fr. (Active)   Site Assessment Clean;Skin intact;Pink 12/05/23 0951   Output (mL) 20 mL 12/05/23 1100                   Wound 12/05/23 Pressure Injury Sacrum (Active)   Date First Assessed/Time First Assessed: 12/05/23 0100   Present on Original Admission: Yes  Primary Wound Type: Pressure Injury  Location: Sacrum   Number of days: 0       Wound 12/05/23 Pressure Injury Buttocks Right (Active)   Date First Assessed/Time First Assessed: 12/05/23 0100    Present on Original Admission: Yes  Primary Wound Type: Pressure Injury  Location: Buttocks  Wound Location Orientation: Right   Number of days: 0     Wound 12/05/23 Pressure Injury Sacrum (Active)       Wound 12/05/23 Pressure Injury Buttocks Right (Active)                   Wound Team Plan:       Lorenzo Can LPN  12/5/2023  1:26 PM

## 2023-12-05 NOTE — PROGRESS NOTES
[facility] Critical Care Medicine       Date:  12/5/2023  Patient:  Daniel Robb  YOB: 1940  MRN:  [id number]   Admit Date:  12/4/2023  ========================================================================================================    No chief complaint on file.        History of Present Illness:  Daniel Robb is a 83 y.o. year old male patient with significant PMH listed below including chronic respiratory failure on 2 L nasal cannula as needed, COPD, colon cancer status post partial colectomy with end colostomy, dysphagia resulting in aspiration pneumonia, initially presented to the ED yesterday afternoon after patient was found at SNF complaining of diffuse pain when pivoting, although EMS was unsure whether they were called.  Apparently when they showed up SpO2 was 85% on room air.  Information in this document obtained from records review only as patient is currently intubated and sedated, unable to contact family at this time.  Admitted yesterday initially under hospital medicine for pneumonia as well as sepsis without septic shock.  Patient was started on azithromycin, Zosyn.  Initially hypokalemic which has been replaced.  No echo on file.      Interval ICU Events:  Rapid response in the middle of the night.  Patient was apparently on 15 L Oxymizer with SpO2 high 80s to 90%.  Patient was nonresponsive to verbal or painful stimuli.  Respirations were unlabored, obvious respiratory distress.  Patient was initially hypertensive.  Was placed on 100% nonrebreather.  Immediately transferred to ICU.  Attempted to call emergency contact, [place], on 2 different numbers however the first number was disconnected, message left at the second number.  ABGs were obtained when patient was on RMF, resulted when patient arrived to ICU, pH 7.12 with pCO2 118 and pO2 65 SO2 85.  Intubated with direct visualization without incident.  Patient became hypotensive, IV fluids started, started  norepinephrine.  Started on fentanyl drip for sedation.  Patient does appear quite dry, giving more fluids.  No documented prior echo or known history of heart failure.        Medical History:  Past Medical History:   Diagnosis Date   • Acute on chronic respiratory failure (CMS/HCC)    • Aspiration into respiratory tract    • Asthma    • Bile duct calculus    • Choledocholithiasis 01/23/2023   • Cholelithiasis 01/20/2023   • COPD (chronic obstructive pulmonary disease) (CMS/[facility])    • Dysphagia    • HLD (hyperlipidemia)    • Hypertension    • On home oxygen therapy     PRN basis   • Personal history of other malignant neoplasm of rectum, rectosigmoid junction, and anus     History of rectal cancer   • Personal history of urinary calculi     History of nephrolithiasis   • Protein calorie malnutrition (CMS/[facility])    • PVD (peripheral vascular disease) (CMS/[facility]) 01/20/2023     Past Surgical History:   Procedure Laterality Date   • BILE DUCT STENT PLACEMENT     • COLECTOMY PARTIAL / TOTAL      Partial with end colostomy   • ERCP      bile duct stent removal   • JOINT REPLACEMENT Right     Hip   • TONSILLECTOMY       Medications Prior to Admission   Medication Sig Dispense Refill Last Dose   • acetaminophen (Tylenol) 325 mg tablet Take 2 tablets (650 mg) by mouth every 4 hours if needed for mild pain (1 - 3) or fever (temp greater than 38.0 C). Do Not Exceed 3 grams in 24 hours   Unknown   • albuterol 108 (90 Base) MCG/ACT inhaler Inhale 2 puffs every 4 hours if needed for wheezing.      • albuterol 2.5 mg /3 mL (0.083 %) nebulizer solution Take 3 mL (2.5 mg) by nebulization.      • calcium carbonate-vitamin D3 500 mg-5 mcg (200 unit) tablet       • fluticasone propion-salmeteroL (Advair Diskus) 100-50 mcg/dose diskus inhaler Inhale. Rinse mouth with water after use to reduce aftertaste and incidence of candidiasis. Do not swallow.      • guaiFENesin (Robitussin) 100 mg/5 mL syrup Take 10 mL (200 mg) by mouth every 4 hours  if needed for cough.   Unknown   • hydroCHLOROthiazide (HYDRODiuril) 25 mg tablet Take 1 tablet (25 mg) by mouth once daily.   12/4/2023 at am   • ipratropium (Atrovent) 0.02 % nebulizer solution Take 2.5 mL (0.5 mg) by nebulization every 8 hours if needed for shortness of breath.   12/4/2023 at 1000   • Lactobacillus acidophilus (ACIDOPHILUS ORAL) Take 1 tablet by mouth in the morning and at bedtime.      • lovastatin (Mevacor) 40 mg tablet Take 1 tablet (40 mg) by mouth once daily at bedtime.   12/3/2023 at pm   • metoprolol tartrate (Lopressor) 100 mg tablet Take 1 tablet (100 mg) by mouth once daily at bedtime.   12/3/2023 at pm   • metoprolol tartrate (Lopressor) 100 mg tablet Take 0.5 tablets (50 mg) by mouth once daily in the morning.   12/4/2023 at am   • metoprolol tartrate (Lopressor) 50 mg tablet Take 1 tablet by mouth once daily.      • oxygen (O2) therapy Inhale 2 L/min at 120,000 mL/hr Every night at midnight.      • tiotropium (Spiriva) 18 mcg inhalation capsule Place 1 capsule (18 mcg) into inhaler and inhale once daily.      • traMADol (Ultram) 50 mg tablet Take 1 tablet (50 mg) by mouth every 6 hours if needed for severe pain (7 - 10).   Unknown     Patient has no known allergies.  Social History     Tobacco Use   • Smoking status: Former     Types: Cigarettes     Passive exposure: Never   • Smokeless tobacco: Never   Substance Use Topics   • Alcohol use: Never   • Drug use: Never     Family History   Problem Relation Name Age of Onset   • Heart disease Mother     • Heart disease Father         Hospital Medications:    dexmedeTOMIDine, 0.1-1.5 mcg/kg/hr, Last Rate: 0.2 mcg/kg/hr (12/05/23 0825)  fentaNYL,  mcg/hr, Last Rate: 50 mcg/hr (12/05/23 0800)  lactated Ringer's, 75 mL/hr, Last Rate: 75 mL/hr (12/05/23 0224)  norepinephrine, 0.01-1 mcg/kg/min, Last Rate: 0.07 mcg/kg/min (12/05/23 0820)          Current Facility-Administered Medications:   •  [DISCONTINUED] acetaminophen (Tylenol)  tablet 650 mg, 650 mg, oral, q4h PRN **OR** acetaminophen (Tylenol) oral liquid 650 mg, 650 mg, nasogastric tube, q4h PRN **OR** [DISCONTINUED] acetaminophen (Tylenol) suppository 650 mg, 650 mg, rectal, q4h PRN, Qiana E Pentito, APRN-CNP  •  albuterol 2.5 mg /3 mL (0.083 %) nebulizer solution 2.5 mg, 2.5 mg, nebulization, q6h PRN, Qiana E Pentito, APRN-CNP  •  atorvastatin (Lipitor) tablet 10 mg, 10 mg, oral, Nightly, Qiana E Pentito, APRN-CNP, 10 mg at 12/04/23 2008  •  [Held by provider] benzocaine-menthol (Cepastat Sore Throat) 15-3.6 mg lozenge 1 lozenge, 1 lozenge, Mouth/Throat, q2h PRN, Qiana E Pentito, APRN-CNP  •  calcium carbonate-vitamin D3 500 mg-5 mcg (200 unit) per tablet 1 tablet, 1 tablet, oral, Daily, Qiana E Pentito, APRN-CNP, 1 tablet at 12/04/23 1854  •  cefepime (Maxipime) 1 g in dextrose 5 % 50 mL IV, 1 g, intravenous, q24h, Qiana E Pentito, APRN-CNP, Stopped at 12/05/23 0519  •  dexmedeTOMIDine in NS (Precedex) 400 mcg in 100 mL (4 mcg/mL) infusion, 0.1-1.5 mcg/kg/hr, intravenous, Continuous, Ru Reed, APRN-CNP, Last Rate: 2.77 mL/hr at 12/05/23 0825, 0.2 mcg/kg/hr at 12/05/23 0825  •  fentanyl (Sublimaze) 1000 mcg in sodium chloride 0.9% 100 mL (10 mcg/mL) infusion (premix),  mcg/hr, intravenous, Continuous, Qiana E Dagobertoito, APRN-CNP, Last Rate: 5 mL/hr at 12/05/23 0800, 50 mcg/hr at 12/05/23 0800  •  fluticasone furoate-vilanteroL (Breo Ellipta) 100-25 mcg/dose inhaler 1 puff, 1 puff, inhalation, Daily, Qiana E Pentito, APRN-CNP, 1 puff at 12/04/23 2004  •  heparin (porcine) injection 5,000 Units, 5,000 Units, subcutaneous, q8h, Qiana E Pentito, APRN-CNP, 5,000 Units at 12/05/23 0224  •  lactated Ringer's infusion, 75 mL/hr, intravenous, Continuous, Qiana E Pentito, APRN-CNP, Last Rate: 75 mL/hr at 12/05/23 0224, 75 mL/hr at 12/05/23 0224  •  lactobacillus acidophilus tablet 1 tablet, 1 tablet, oral, Daily, Qiana E Pentito, APRN-CNP, 1 tablet at 12/04/23 1854  •   "[START ON 12/6/2023] levoFLOXacin (Levaquin)  mg, 250 mg, intravenous, q24h, Qiana E Pentito, APRN-CNP  •  [START ON 12/6/2023] methylPREDNISolone sod succinate (PF) (SOLU-Medrol) 40 mg/mL injection 40 mg, 40 mg, intravenous, q24h, Qiana E Pentito, APRN-CNP  •  [Held by provider] metoprolol tartrate (Lopressor) tablet 50 mg, 50 mg, oral, Daily, Qiana E Pentito, APRN-CNP, 50 mg at 12/04/23 1854  •  norepinephrine (Levophed) 8 mg in dextrose 5% 250 mL (0.032 mg/mL) infusion (premix), 0.01-1 mcg/kg/min, intravenous, Continuous, Qiana E Pentito, APRN-CNP, Last Rate: 7.27 mL/hr at 12/05/23 0820, 0.07 mcg/kg/min at 12/05/23 0820  •  oxygen (O2) therapy, , inhalation, Continuous PRN - O2/gases, Qiana E Pentito, APRN-CNP, Rate Verify at 12/05/23 0739  •  pantoprazole (ProtoNix) injection 40 mg, 40 mg, intravenous, Daily, Qiana E Pentito, APRN-CNP  •  perflutren lipid microspheres (Definity) injection 0.5-10 mL of dilution, 0.5-10 mL of dilution, intravenous, Once in imaging, Qiana E Pentito, APRN-CNP  •  polyethylene glycol (Glycolax, Miralax) packet 17 g, 17 g, oral, Daily, Qiana E Pentito, APRN-CNP, 17 g at 12/04/23 0787    Review of Systems:  14 point review of systems was completed and negative except for those specially mention in my HPI    Physical Exam:    Heart Rate:  []   Temp:  [36.1 °C (97 °F)-36.6 °C (97.9 °F)]   Resp:  [17-38]   BP: ()/(44-93)   Height:  [188 cm (6' 2\")]   Weight:  [55.4 kg (122 lb 2.2 oz)-63.5 kg (140 lb)]   SpO2:  [80 %-100 %]     Physical Exam    Objective:    I have reviewed all medications, laboratory results, and imaging pertinent for today's encounter.    Vent Mode: Volume control/assist control  FiO2 (%):  [50 %-100 %] 50 %  S RR:  [20] 20  S VT:  [450 mL] 450 mL  PEEP/CPAP (cm H2O):  [7 cm H20] 7 cm H20  MAP (cm H2O):  [16-20] 20      Intake/Output Summary (Last 24 hours) at 12/5/2023 0833  Last data filed at 12/5/2023 0800  Gross per 24 hour   Intake 2784 ml "   Output 225 ml   Net 2559 ml         Assessment/Plan:    I am currently managing this critically ill patient for the following problems:    Neuro/Psych/Pain Ctrl/Sedation:  ***    Respiratory/ENT:  ***    Cardiovascular:  ***    GI:  ***    Renal/Volume Status (Intra & Extravascular):  ***    Endocrine  ***    Infectious Disease:  ***    Heme/Onc:  ***    OBGYN/MSK:  ***    Ethics/Code Status:  ***    :  DVT Prophylaxis: ***  GI Prophylaxis: ***  Bowel Regimen: ***  Diet: ***  CVC: ***  [place]: ***  [place]: ***  Restraints: ***  Dispo: ***    Critical Care Time:  ***    Sabina Donohue, APRN-CNP

## 2023-12-05 NOTE — CONSULTS
"Nutrition Note  Reason for Assessment  Reason for Assessment: Provider consult order     Assessment    Nutrition History:  Energy Intake:  (NPO on vent)  Food and Nutrient History: Pt admitted from SNF. He was intubated and sedated with no family present upon RD visit. Pt receiving low dose levophed at 0.07 mcg/kg/min at time of RD visit. Per NP plan to start tube feeding today.  Vitamin/Herbal Supplement Use: 500 mg calcium + 5 mcg vitamin D3, Bacid    Difficulty chewing: intubated  Difficulty swallowing: intubated    PMH, meds, and labs reviewed.  Dietary Orders (From admission, onward)       Start     Ordered    12/05/23 1423  Enteral feeding with NPO OG (orogastic tube); 40; 500; Every 6 hours  Diet effective now        Question Answer Comment   Tube feeding formula: Jevity 1.5    Feeding route: OG (orogastic tube)    Tube feeding continuous rate (mL/hr): 40    Tube feeding flush (mL): 500    Flush frequency: Every 6 hours        12/05/23 1426 12/04/23 2051  May Not Participate in Room Service  Once        Question:  .  Answer:  Yes    12/04/23 2051                       GI per flowsheet:  Gastrointestinal  Gastrointestinal (WDL): Exceptions to WDL  Abdomen Inspection: Soft, Distended (colostomy)  Abdominal Tenderness: Soft, Nontender  Bowel Sounds: All quadrants  Bowel Sounds (All Quadrants): Hypoactive  Passing Flatus: Yes  Bowel Incontinence: No  Last bowel movement documented:    Allergies: Patient has no known allergies.     Anthropometrics:  Height: 188 cm (6' 2\")  Weight: 55.4 kg (122 lb 2.2 oz)  BMI (Calculated): 15.67  IBW: 80.9 kg      Weight History / % Weight Change: No EMR weight records for the past year                   Estimated Nutritional Needs:  Method for Estimating Needs: 1374-5539 (25-30 kcals/kg)    Method for Estimating Needs: 83-94 (1.5-1.7 g/kg) as renal function permits    Method for Estimating Needs: 1 mL/kcal or as per MD    Nutrition Focused Physical Findings:   Orbital Fat " Pads: Defer (constraints of critical care. Pt does have visible loses. Will attempt NFPE as clinical course permits)         Edema  Edema: none    Skin: Positive (Pressure ulcers on sacrum and R buttocks per flowsheet but no stage documented)  Pain Score: 2  Critical-Care Pain Observation Score:  [2]      Nutrition Diagnosis   Patient has Malnutrition Diagnosis:  (suspect malnutrition but insufficient data available at this time to make diagnosis)    Patient has Nutrition Diagnosis: Yes  New  Nutrition Diagnosis 1: Inadequate energy intake  Related to (1): impaired respiratory status; impaired ability to consume sufficient energy  As Evidenced by (1): NPO on vent         Plan    Interventions  Individualized Nutrition Prescription Provided for : Jevity 1.5 at 40 mL/hr x24 hrs, 100  mL water flush Q4 hrs, Prostat to be given with water flush BID (for an additional 100 kcals, 15 gm protein each). This provides 960 mL tube feeding formula, 1640 kcals (1440 kcals from TF), 91 gm protein (61 gm from TF), 1330 mL free water.  Collaboration and Referral of Nutrition Care: Collaboration by nutrition professional with other providers (Sabina sesay NP)  Interventions: Enteral intake  Enteral Intake: Other (Comment) (Initiate tube feeding)  Goal: Pt to tolerate tube feeding to meet >75% of estimated needs      Nutrition Monitoring and Evaluation   Body Composition/Growth/Weight History  Monitoring and Evaluation Plan: Weight  Weight: Measured weight  Criteria: maintain weight, reweigh at least every 5 days  Biochemical Data, Medical Tests and Procedures  Monitoring and Evaluation Plan: Electrolyte/renal panel  Electrolyte and Renal Panel: Phosphorus, Potassium, Sodium  Criteria: WNL  Food/Nutrient Related History Monitoring  Monitoring and Evaluation Plan: Enteral and parenteral nutrition intake  Enteral and Parenteral Nutrition Intake: Enteral nutrition intake  Criteria: provision of EN versus recommendations and estimated  needs  Nutrition Focused Physical Findings  Monitoring and Evaluation Plan: Skin  Skin: Impaired wound healing  Criteria: wound healing      Education Documentation  No documentation found.      Time Spent (min): 45 minutes  Last Date of Nutrition Visit: 12/05/23  Nutrition Follow-Up Needed?: 3-5 days

## 2023-12-05 NOTE — CONSULTS
Vancomycin Dosing by Pharmacy- Cessation of Therapy    Consult to pharmacy for vancomycin dosing has been discontinued by the prescriber, pharmacy will sign off at this time.    Please call pharmacy if there are further questions or re-enter a consult if vancomycin is resumed.     Kendra Fregoso, PharmD

## 2023-12-05 NOTE — H&P
[facility] Critical Care Medicine       Date:  12/5/2023  Patient:  Daniel Robb  YOB: 1940  MRN:  [id number]   Admit Date:  12/4/2023  ========================================================================================================    No chief complaint on file.        History of Present Illness:  Daniel Robb is a 83 y.o. year old male patient with significant PMH listed below including chronic respiratory failure on 2 L nasal cannula as needed, COPD, colon cancer status post partial colectomy with end colostomy, dysphagia resulting in aspiration pneumonia, initially presented to the ED yesterday afternoon after patient was found at [facility]F complaining of diffuse pain when pivoting, although EMS was unsure whether they were called.  Apparently when they showed up SpO2 was 85% on room air.  Information in this document obtained from records review only as patient is currently intubated and sedated, unable to contact family at this time.  Admitted yesterday initially under hospital medicine for pneumonia as well as sepsis without septic shock.  Patient was started on azithromycin, Zosyn.  Initially hypokalemic which has been replaced.  No echo on file.     Interval ICU Events:  Rapid response in the middle of the night.  Patient was apparently on 15 L Oxymizer with SpO2 high 80s to 90%.  Patient was nonresponsive to verbal or painful stimuli.  Respirations were unlabored, obvious respiratory distress.  Patient was initially hypertensive.  Was placed on 100% nonrebreather.  Immediately transferred to ICU.  Attempted to call emergency contact, [place], on 2 different numbers however the first number was disconnected, message left at the second number.  ABGs were obtained when patient was on RMF, resulted when patient arrived to ICU, pH 7.12 with pCO2 118 and pO2 65 SO2 85.  Intubated with direct visualization without incident.  Patient became hypotensive, IV fluids started, started  norepinephrine.  Started on fentanyl drip for sedation.  Patient does appear quite dry, giving more fluids.  No documented prior echo or known history of heart failure.    Medical History:  Past Medical History:   Diagnosis Date    Acute on chronic respiratory failure (CMS/[facility])     Aspiration into respiratory tract     Asthma     Bile duct calculus     Choledocholithiasis 01/23/2023    Cholelithiasis 01/20/2023    COPD (chronic obstructive pulmonary disease) (CMS/[facility])     Dysphagia     HLD (hyperlipidemia)     Hypertension     On home oxygen therapy     PRN basis    Personal history of other malignant neoplasm of rectum, rectosigmoid junction, and anus     History of rectal cancer    Personal history of urinary calculi     History of nephrolithiasis    Protein calorie malnutrition (CMS/[facility])     PVD (peripheral vascular disease) (CMS/[facility]) 01/20/2023     Past Surgical History:   Procedure Laterality Date    BILE DUCT STENT PLACEMENT      COLECTOMY PARTIAL / TOTAL      Partial with end colostomy    ERCP      bile duct stent removal    JOINT REPLACEMENT Right     Hip    TONSILLECTOMY       Medications Prior to Admission   Medication Sig Dispense Refill Last Dose    acetaminophen (Tylenol) 325 mg tablet Take 2 tablets (650 mg) by mouth every 4 hours if needed for mild pain (1 - 3) or fever (temp greater than 38.0 C). Do Not Exceed 3 grams in 24 hours   Unknown    albuterol 108 (90 Base) MCG/ACT inhaler Inhale 2 puffs every 4 hours if needed for wheezing.       albuterol 2.5 mg /3 mL (0.083 %) nebulizer solution Take 3 mL (2.5 mg) by nebulization.       calcium carbonate-vitamin D3 500 mg-5 mcg (200 unit) tablet        fluticasone propion-salmeteroL (Advair Diskus) 100-50 mcg/dose diskus inhaler Inhale. Rinse mouth with water after use to reduce aftertaste and incidence of candidiasis. Do not swallow.       guaiFENesin (Robitussin) 100 mg/5 mL syrup Take 10 mL (200 mg) by mouth every 4 hours if needed for cough.   Unknown     hydroCHLOROthiazide (HYDRODiuril) 25 mg tablet Take 1 tablet (25 mg) by mouth once daily.   12/4/2023 at am    ipratropium (Atrovent) 0.02 % nebulizer solution Take 2.5 mL (0.5 mg) by nebulization every 8 hours if needed for shortness of breath.   12/4/2023 at 1000    Lactobacillus acidophilus (ACIDOPHILUS ORAL) Take 1 tablet by mouth in the morning and at bedtime.       lovastatin (Mevacor) 40 mg tablet Take 1 tablet (40 mg) by mouth once daily at bedtime.   12/3/2023 at pm    metoprolol tartrate (Lopressor) 100 mg tablet Take 1 tablet (100 mg) by mouth once daily at bedtime.   12/3/2023 at pm    metoprolol tartrate (Lopressor) 100 mg tablet Take 0.5 tablets (50 mg) by mouth once daily in the morning.   12/4/2023 at am    metoprolol tartrate (Lopressor) 50 mg tablet Take 1 tablet by mouth once daily.       oxygen (O2) therapy Inhale 2 L/min at 120,000 mL/hr Every night at midnight.       tiotropium (Spiriva) 18 mcg inhalation capsule Place 1 capsule (18 mcg) into inhaler and inhale once daily.       traMADol (Ultram) 50 mg tablet Take 1 tablet (50 mg) by mouth every 6 hours if needed for severe pain (7 - 10).   Unknown     Patient has no known allergies.  Social History     Tobacco Use    Smoking status: Former     Types: Cigarettes     Passive exposure: Never    Smokeless tobacco: Never   Substance Use Topics    Alcohol use: Never    Drug use: Never     Family History   Problem Relation Name Age of Onset    Heart disease Mother      Heart disease Father         Hospital Medications:    fentaNYL,  mcg/hr, Last Rate: 50 mcg/hr (12/05/23 0300)  lactated Ringer's, 75 mL/hr, Last Rate: 75 mL/hr (12/05/23 0224)  norepinephrine, 0.01-1 mcg/kg/min, Last Rate: 0.14 mcg/kg/min (12/05/23 0300)          Current Facility-Administered Medications:     [DISCONTINUED] acetaminophen (Tylenol) tablet 650 mg, 650 mg, oral, q4h PRN **OR** acetaminophen (Tylenol) oral liquid 650 mg, 650 mg, nasogastric tube, q4h PRN **OR**  [DISCONTINUED] acetaminophen (Tylenol) suppository 650 mg, 650 mg, rectal, q4h PRN, Qiana E Pentito, APRN-CNP    albuterol 2.5 mg /3 mL (0.083 %) nebulizer solution 2.5 mg, 2.5 mg, nebulization, q6h PRN, Qiana E Pentito, APRN-CNP    atorvastatin (Lipitor) tablet 10 mg, 10 mg, oral, Nightly, Qiana E Pentito, APRN-CNP, 10 mg at 12/04/23 2008    [Held by provider] benzocaine-menthol (Cepastat Sore Throat) 15-3.6 mg lozenge 1 lozenge, 1 lozenge, Mouth/Throat, q2h PRN, Qiana E Pentito, APRN-CNP    calcium carbonate-vitamin D3 500 mg-5 mcg (200 unit) per tablet 1 tablet, 1 tablet, oral, Daily, Qiana E Pentito, APRN-CNP, 1 tablet at 12/04/23 1854    cefepime (Maxipime) 1 g in dextrose 5 % 50 mL IV, 1 g, intravenous, q24h, Qiana E Pentito, APRN-CNP    fentanyl (Sublimaze) 1000 mcg in sodium chloride 0.9% 100 mL (10 mcg/mL) infusion (premix),  mcg/hr, intravenous, Continuous, Qiana E Pentito, APRN-CNP, Last Rate: 5 mL/hr at 12/05/23 0300, 50 mcg/hr at 12/05/23 0300    fluticasone furoate-vilanteroL (Breo Ellipta) 100-25 mcg/dose inhaler 1 puff, 1 puff, inhalation, Daily, Qiana E Pentito, APRN-CNP, 1 puff at 12/04/23 2004    heparin (porcine) injection 5,000 Units, 5,000 Units, subcutaneous, q8h, Qiana E Pentito, APRN-CNP, 5,000 Units at 12/05/23 0224    lactated Ringer's infusion, 75 mL/hr, intravenous, Continuous, Qiana E Pentito, APRN-CNP, Last Rate: 75 mL/hr at 12/05/23 0224, 75 mL/hr at 12/05/23 0224    lactobacillus acidophilus tablet 1 tablet, 1 tablet, oral, Daily, GILLIAN Angulo, 1 tablet at 12/04/23 1854    [START ON 12/6/2023] levoFLOXacin (Levaquin)  mg, 250 mg, intravenous, q24h, Qiana Mayer, APRN-CNP    levoFLOXacin (Levaquin)  mg, 500 mg, intravenous, Once, AIDAN Angulo-CNP    methylPREDNISolone sod succinate (PF) (SOLU-Medrol) 40 mg/mL injection 40 mg, 40 mg, intravenous, q24h, AIDAN Angulo-CNP    [Held by provider] metoprolol tartrate  "(Lopressor) tablet 50 mg, 50 mg, oral, Daily, Qiana E Pentito, APRN-CNP, 50 mg at 12/04/23 1854    norepinephrine (Levophed) 8 mg in dextrose 5% 250 mL (0.032 mg/mL) infusion (premix), 0.01-1 mcg/kg/min, intravenous, Continuous, Qiana E Pentito, APRN-CNP, Last Rate: 14.54 mL/hr at 12/05/23 0300, 0.14 mcg/kg/min at 12/05/23 0300    oxygen (O2) therapy, , inhalation, Continuous PRN - O2/gases, Qiana E Pentito, APRN-CNP    pantoprazole (ProtoNix) injection 40 mg, 40 mg, intravenous, Daily, Qiana E Pentito, APRN-CNP    polyethylene glycol (Glycolax, Miralax) packet 17 g, 17 g, oral, Daily, Qiana E Pentito, APRN-CNP, 17 g at 12/04/23 1757    Review of Systems:  14 point review of systems was completed and negative except for those specially mention in my HPI    Physical Exam:    Heart Rate:  []   Temp:  [36.1 °C (97 °F)-36.6 °C (97.9 °F)]   Resp:  [17-24]   BP: ()/(44-93)   Height:  [188 cm (6' 2\")]   Weight:  [55.4 kg (122 lb 2.2 oz)-63.5 kg (140 lb)]   SpO2:  [80 %-100 %]     Physical Exam  Vitals and nursing note reviewed.   Constitutional:       General: He is in acute distress.      Appearance: He is ill-appearing.      Comments: Frail, malnourished   HENT:      Head: Normocephalic.      Mouth/Throat:      Mouth: Mucous membranes are dry.      Comments: +ETT/OG  Eyes:      General: No scleral icterus.  Cardiovascular:      Rate and Rhythm: Normal rate and regular rhythm.   Pulmonary:      Breath sounds: Wheezing and rhonchi present.      [facility]  Abdominal:      General: Abdomen is flat.      Palpations: Abdomen is soft.      Comments: + ostomy, red beefy stoma, solid stool from ostomy, no suspicion for c-diff   Genitourinary:     Comments: Peralta cath with  kristy yellow urine (inserted 12/5)  Musculoskeletal:      Comments: Generalized weakness, muscle atrophy   Skin:     General: Skin is warm and dry.      Comments: Stage 2 PU buttock   Psychiatric:      Comments: Sedated, " some response when sedation dropped, not following commands         Objective:    I have reviewed all medications, laboratory results, and imaging pertinent for today's encounter.    Vent Mode: Volume control/assist control  FiO2 (%):  [50 %-100 %] 50 %  S RR:  [20] 20  S VT:  [450 mL] 450 mL  PEEP/CPAP (cm H2O):  [7 cm H20] 7 cm H20  MAP (cm H2O):  [16] 16      Intake/Output Summary (Last 24 hours) at 12/5/2023 0405  Last data filed at 12/5/2023 0132  Gross per 24 hour   Intake 1850 ml   Output --   Net 1850 ml         Assessment/Plan:    I am currently managing this critically ill patient for the following problems:  Acute on chronic respiratory failure with hypoxia and hypercapnia  CO2 narcosis  Septic shock  CAP requiring mechanical ventilation  UTI  R/O Aspiration pna  COPD w/ acute exacerbation  CHARY  FTT  Hyperammonemia    Neuro/Psych/Pain Ctrl/Sedation:  CO2 narcosis  Mechanical ventilation  -Titrate to RASS 0 to -2  -Fentanyl for sedation for now, if needed may need ketamine given BP fluctuations  -Frequent neurochecks, sedation vacation, wean from vent when able  -Provide ICU delirium postextubation    Respiratory/ENT:  Acute on chronic respiratory failure with hypoxia and hypercapnia  CAP requiring mechanical ventilation  COPD w/ acute exacerbation  -On 2 L nasal cannula as needed  -In with pneumonia, wheezing and rhonchorous, retaining CO2  -Pulmonary toilet  -ABG's prn, CXR prn to monitor for resolution  -Titrate FiO2 to maintain PO2 > 75 and SpO2 > 92%  -Solumedrol 40 mg daily    Cardiovascular:  -No known history of CAD only HLD and HTN  -Currently hypotensive although appears to be more medication related than from true septic shock  -Vasopressor support as needed to maintain MAP > 65 mmHg for adequate perfusion  -Continuous cardiac monitoring  -Echo in AM (no Echo on file anywhere in records, dyspnea, hypotension, copd)    GI:  Hyperammonemia  -Only slightly elevated at 78, suspect more CO2  involvement causing mental status change  -Lactulose 30 g x 1  -N.p.o.  -OG to LIWS    Renal/Volume Status (Intra & Extravascular)/Electrolytes:  CHARY  -Possible prior CKD 3 however prior renal function mostly normal  -Suspect multifactorial given dehydration as patient does appear volume depleted, poor nutritional status  -Replace volume, give 1 L LR followed by continuous at 75 an hour given unknown EF  -Avoid nephrotoxic drugs, strict intake and output  -Monitor replace electrolytes, keep potassium greater than 4 and magnesium greater than 2    Endocrine  -No history of diabetes or thyroid dysfunction  -Check TSH with reflex  -Monitor glucose levels with steroids, may need to initiate SSI    Infectious Disease:  Septic shock  UTI  R/O Aspiration pna  CAP  -Mild lactic acidosis most likely 2/2 hypoxia, hypotension possibly more medication induced however there is significant leukocytosis  -UA (+) for possible source, no prior (+) cx's noted  -History of aspiration pneumonia from dysphagia, evolving pneumonia on chest x-ray  -N.p.o. after extubation pending SLP, may need MBS  -Follow-up on cultures  -Given risk factors will cover with vancomycin, cefepime instead of Zosyn given renal function and Levaquin  -Check urine for Legionella and strep  -Check MRSA PCR, if negative discontinue Vanco  -Check procalcitonin    Heme/Onc:  -Pretty significant leukocytosis, WBC is improving initially 38 now 29.3 with ANC 26, looks like pretty chronic leukocytosis    OBGYN/MSK:  FTT  -Social work for DC planning  -PT/OT when extubated  -SLP when extubated  -High risk for readmission    Ethics/Code Status:  Full code    :  DVT Prophylaxis: Heparin sq  GI Prophylaxis: Protonix  Bowel Regimen: Miralax   Diet: NPO  CVC: NA  [place]: NA  Peralta: Inserted 12/5  Restraints: Yes, bilateral wrist, soft  Dispo: ICU    Critical Care Time:  50    Qiana Mayer, APRN-CNP

## 2023-12-05 NOTE — PROGRESS NOTES
Speech-Language Pathology                 Therapy Communication Note    Patient Name: Daniel Robb  MRN: [id number]  Today's Date: 12/5/2023     Discipline: Speech Language Pathology    Missed Visit Reason:  Pt intubated. Will discontinue orders at this time. Please place new orders when appropriate for P.O. intake and skilled dysphagia therapy.     Missed Time: Attempt    Comment:

## 2023-12-05 NOTE — PROGRESS NOTES
"Spoke to Dr Watts, ENT, regarding CT results (neck). It is his recommendation to discuss further with [facility] as pt may require transfer.   I did speak to the transfer center and informed them of Dr Riley suspicions regarding the diagnosis. Dr Holloway is on call currently at [facility] and will call with further recommendations.     UPDATE: Dr Holloway returned my call and is curretly reviewing CT scans and will call back with further recommendations, including the possibility of a tx to [facility]    I called the pts first point of contact number (no answer) and will continue to call alternative phone numbers provided to update family accordingly    1835: I tried to contact family at each number provided in the chart. [place]'s (pts son) phoe states that the number is not in service and there was no answer on the otherwise listed \"home phone\" numbers (not even an answering machine for voicemail). We will attempt again once we hear back from [facility] regarding transfer    1915 UPDATE: I spoke to Dr Holloway who strongly recommended our ENT perform a stat bedside evaluation. He reviewed the CT scans and stated that he will remain available to our critical care team as well as Dr Watts, ENT, should there be concerns or questions (or to discuss the case further). I reached out to Dr Watts and informed him of these recommendations.. Dr Watts stated that he is unavailable tonight and can see the pt tomorrow.   Dr Green made aware and is reaching out to [facility], Dr Holloway, to let him know that Dr Watts is unavailable tonight and inquire about transferring the pt immediately for further examination should he feel it is necessary based on his interpretation of the CT results (unfortunately, without visual examination of the pt)  "

## 2023-12-05 NOTE — SIGNIFICANT EVENT
[facility] SIGNIFICANT EVENT NOTE:    Date:  12/5/2023  Patient:  Daniel Robb  YOB: 1940  MRN:  [id number]   Admit Date:  12/4/2023  =============================================================================================    Author was called to bedside by nursing at 0036 for Rapid response. Came to bedside, nurse reports patient became unresponsive despite sternal rubs. He was on oximizer and saturating 90%, switched to nonrebreather oxygen. Noted to be in labored breathing, not opening eyes to stimuli or responding to voice. Nurse reports patient had nebulizer treatment earlier, and was refusing hip Xray at start of shift.    Gen: Elderly adult male, labored breathing, not responding to voice or touch.  HEENT: Normocephalic, atraumatic  CV: Rapid regular rate and rhythm, S1 and S2 present, no murmurs rubs or gallops appreciated  Resp: Lungs diminished to auscultation bilaterally, no ronchi, rales or wheezes appreciated  Abdomen: soft, nondistended  MSK: No joint swelling appreciated  Neuro: unresponsive to stimuli or sternal rub    Action Plan:  Discussed with ICU team, who agreed to transfer patient to unit for closer monitoring and respiratory interventions. ABG drawn at bedside and results pending.     Daniel Green MD, MPH, [facility]  Hospitalist Medicine

## 2023-12-06 NOTE — CONSULTS
Reason For Consult  Concern for necrotizing fasciitis vs subcutaneous air    History Of Present Illness  Daniel Robb is a 83 y.o. male with PMH HTN, COPD (2L PRN at baseline), colon cancer s/p end colostomy, PVD, who presented as a transfer from [place] ED with acute hypoxic/hypercapnic respiratory failure requiring intubation. Patient was initially presented to [place] with hypoxia and started on abx for pneumonia and sepsis. Patient course was complicated by respiratory failure requiring intubation. CT with evidence of soft tissue gas at the base of the amarilis extending in the right supraclavicular region and axilla. Patient transferred to [facility] for further evaluation by ENT/ Thoracic surgery.  Patient currently intubated and sedated with no family at bedside. On 0.03 of vaso and 0.01 of levo. He appears in no acute distress. On 5 PEEP 40% FIO2.      Past Medical History  He has a past medical history of Acute on chronic respiratory failure (CMS/HCC), Aspiration into respiratory tract, Asthma, Bile duct calculus, Choledocholithiasis (01/23/2023), Cholelithiasis (01/20/2023), COPD (chronic obstructive pulmonary disease) (CMS/HCC), Dysphagia, HLD (hyperlipidemia), Hypertension, On home oxygen therapy, Personal history of other malignant neoplasm of rectum, rectosigmoid junction, and anus, Personal history of urinary calculi, Protein calorie malnutrition (CMS/HCC), and PVD (peripheral vascular disease) (CMS/HCC) (01/20/2023).    He has no past medical history of AAA (abdominal aortic aneurysm) (CMS/[facility]).    Surgical History  He has a past surgical history that includes ERCP; Colectomy partial / total; Tonsillectomy; Joint replacement (Right); and Bile duct stent placement.     Social History  He reports that he has quit smoking. His smoking use included cigarettes. He has never been exposed to tobacco smoke. He has never used smokeless tobacco. He reports that he does not drink alcohol and does not use  drugs.    Family History  Family History   Problem Relation Name Age of Onset    Heart disease Mother      Heart disease Father          Allergies  Patient has no known allergies.    Review of Systems  Review of Systems   Reason unable to perform ROS: intubated and sedated with no family at bedside.      Medication  Current Outpatient Medications   Medication Instructions    acetaminophen (TYLENOL) 650 mg, oral, Every 4 hours PRN, Do Not Exceed 3 grams in 24 hours    guaiFENesin (ROBITUSSIN) 200 mg, oral, Every 4 hours PRN    hydroCHLOROthiazide (HYDRODIURIL) 25 mg, oral, Daily    ipratropium (ATROVENT) 0.5 mg, nebulization, Every 8 hours PRN    lovastatin (MEVACOR) 40 mg, oral, Nightly    metoprolol tartrate (LOPRESSOR) 100 mg, oral, Nightly    metoprolol tartrate (LOPRESSOR) 50 mg, oral, Every morning    oxygen DME/Hospice (O2) 2 L/min, inhalation, Nightly    traMADol (ULTRAM) 50 mg, oral, Every 6 hours PRN       Current Facility-Administered Medications:     albuterol 2.5 mg /3 mL (0.083 %) nebulizer solution 2.5 mg, 2.5 mg, nebulization, q4h, Ilan Campuzano MD    clindamycin in D5W (Cleocin) IVPB 900 mg, 900 mg, intravenous, q8h, Ilan Campuzano MD    dexmedeTOMIDine in NS (Precedex) 400 mcg in 100 mL (4 mcg/mL) infusion, 0.1-1.5 mcg/kg/hr, intravenous, Continuous, Ilan Campuzano MD, Last Rate: 2.77 mL/hr at 12/06/23 0045, 0.2 mcg/kg/hr at 12/06/23 0045    fentanyl (Sublimaze) 1000 mcg in sodium chloride 0.9% 100 mL (10 mcg/mL) infusion (premix),  mcg/hr, intravenous, Continuous, Ilan Campuzano MD, Last Rate: 10 mL/hr at 12/06/23 0035, 100 mcg/hr at 12/06/23 0035    heparin (porcine) injection 5,000 Units, 5,000 Units, subcutaneous, q8h, Ilan Campuzano MD, 5,000 Units at 12/06/23 0143    hydrocortisone sod succ (PF) (Solu-CORTEF) injection 50 mg, 50 mg, intravenous, q6h, Ilan Campuzano MD    lactated Ringer's bolus 500 mL, 500 mL, intravenous, Once, Ilan PERRY  "MD Justen, Last Rate: 1,000 mL/hr at 12/06/23 0227, 500 mL at 12/06/23 0227    magnesium sulfate IV 2 g, 2 g, intravenous, Once, Ilan Campuzano MD, Last Rate: 25 mL/hr at 12/06/23 0207, 2 g at 12/06/23 0207    norepinephrine (Levophed) 8 mg in dextrose 5% 250 mL (0.032 mg/mL) infusion (premix), 0.01-1 mcg/kg/min, intravenous, Continuous, Ilan Campuzano MD, Stopped at 12/06/23 0218    oxygen (O2) therapy, , inhalation, Continuous PRN - O2/gases, Ilan Campuzano MD    piperacillin-tazobactam-dextrose (Zosyn) IV 3.375 g, 3.375 g, intravenous, q6h, Ilan Campuzano MD, Last Rate: 100 mL/hr at 12/06/23 0207, 3.375 g at 12/06/23 0207    potassium chloride 40 mEq in 100 mL IV premix, 40 mEq, intravenous, Once, Ilan Campuzano MD, Last Rate: 25 mL/hr at 12/06/23 0207, 40 mEq at 12/06/23 0207    vasopressin (Vasostrict) 0.2 unit/mL infusion, 0.03 Units/min, intravenous, Continuous, Ilan Campuzano MD, Last Rate: 9 mL/hr at 12/06/23 0037, 0.03 Units/min at 12/06/23 0037      Physical Exam  Constitutional: no acute distress, intubated and sedated  Neuro: A/O x4, no gross deficits   Psych: normal affect  HEENT: No deformities, no scleral icterus, no subcutaneous emphysema/ crepitus on exam  Cardiac: regular rate and rhythm  Pulmonary: unlabored respirations   Abdomen: soft, non distended, non tender  Skin: warm and dry overall    Extremities: no swelling noted         Last Recorded Vitals  Blood pressure 138/55, pulse 66, temperature 36.2 °C (97.2 °F), temperature source Temporal, resp. rate 18, height 1.88 m (6' 2.02\"), weight 57.9 kg (127 lb 10.3 oz), SpO2 96 %.    Relevant Results  Results for orders placed or performed during the hospital encounter of 12/05/23 (from the past 24 hour(s))   CBC and Auto Differential   Result Value Ref Range    WBC 24.7 (H) 4.4 - 11.3 x10*3/uL    nRBC 0.0 0.0 - 0.0 /100 WBCs    RBC 3.60 (L) 4.50 - 5.90 x10*6/uL    Hemoglobin 11.1 (L) 13.5 - 17.5 g/dL "    Hematocrit 32.3 (L) 41.0 - 52.0 %    MCV 90 80 - 100 fL    MCH 30.8 26.0 - 34.0 pg    MCHC 34.4 32.0 - 36.0 g/dL    RDW 13.6 11.5 - 14.5 %    Platelets 246 150 - 450 x10*3/uL    Neutrophils % 94.1 40.0 - 80.0 %    Immature Granulocytes %, Automated 0.7 0.0 - 0.9 %    Lymphocytes % 1.7 13.0 - 44.0 %    Monocytes % 3.4 2.0 - 10.0 %    Eosinophils % 0.0 0.0 - 6.0 %    Basophils % 0.1 0.0 - 2.0 %    Neutrophils Absolute 23.20 (H) 1.60 - 5.50 x10*3/uL    Immature Granulocytes Absolute, Automated 0.18 0.00 - 0.50 x10*3/uL    Lymphocytes Absolute 0.41 (L) 0.80 - 3.00 x10*3/uL    Monocytes Absolute 0.85 (H) 0.05 - 0.80 x10*3/uL    Eosinophils Absolute 0.00 0.00 - 0.40 x10*3/uL    Basophils Absolute 0.03 0.00 - 0.10 x10*3/uL   Comprehensive Metabolic Panel   Result Value Ref Range    Glucose 175 (H) 74 - 99 mg/dL    Sodium 140 136 - 145 mmol/L    Potassium 2.9 (LL) 3.5 - 5.3 mmol/L    Chloride 99 98 - 107 mmol/L    Bicarbonate 28 21 - 32 mmol/L    Anion Gap 16 10 - 20 mmol/L    Urea Nitrogen 37 (H) 6 - 23 mg/dL    Creatinine 1.43 (H) 0.50 - 1.30 mg/dL    eGFR 48 (L) >60 mL/min/1.73m*2    Calcium 8.3 (L) 8.6 - 10.6 mg/dL    Albumin 2.5 (L) 3.4 - 5.0 g/dL    Alkaline Phosphatase 68 33 - 136 U/L    Total Protein 6.8 6.4 - 8.2 g/dL    AST 17 9 - 39 U/L    Bilirubin, Total 0.7 0.0 - 1.2 mg/dL    ALT 6 (L) 10 - 52 U/L   Coagulation Screen   Result Value Ref Range    Protime 20.8 (H) 9.8 - 12.8 seconds    INR 1.8 (H) 0.9 - 1.1    aPTT 32 27 - 38 seconds   Lactate   Result Value Ref Range    Lactate 2.1 (H) 0.4 - 2.0 mmol/L   Magnesium   Result Value Ref Range    Magnesium 1.76 1.60 - 2.40 mg/dL   Creatine Kinase   Result Value Ref Range    Creatine Kinase 64 0 - 325 U/L   Lactate Dehydrogenase   Result Value Ref Range     (H) 84 - 246 U/L   Blood Gas Arterial Full Panel   Result Value Ref Range    POCT pH, Arterial 7.52 (H) 7.38 - 7.42 pH    POCT pCO2, Arterial 36 (L) 38 - 42 mm Hg    POCT pO2, Arterial 103 (H) 85 - 95 mm  Hg    POCT SO2, Arterial 99 94 - 100 %    POCT Oxy Hemoglobin, Arterial 97.1 94.0 - 98.0 %    POCT Hematocrit Calculated, Arterial 34.0 (L) 41.0 - 52.0 %    POCT Sodium, Arterial 137 136 - 145 mmol/L    POCT Potassium, Arterial 2.9 (LL) 3.5 - 5.3 mmol/L    POCT Chloride, Arterial 101 98 - 107 mmol/L    POCT Ionized Calcium, Arterial 1.12 1.10 - 1.33 mmol/L    POCT Glucose, Arterial 193 (H) 74 - 99 mg/dL    POCT Lactate, Arterial 2.1 (H) 0.4 - 2.0 mmol/L    POCT Base Excess, Arterial 6.3 (H) -2.0 - 3.0 mmol/L    POCT HCO3 Calculated, Arterial 29.4 (H) 22.0 - 26.0 mmol/L    POCT Hemoglobin, Arterial 11.3 (L) 13.5 - 17.5 g/dL    POCT Anion Gap, Arterial 10 10 - 25 mmo/L    Patient Temperature 37.0 degrees Celsius    FiO2 40 %   Type and screen   Result Value Ref Range    ABO TYPE O     Rh TYPE POS     ANTIBODY SCREEN NEG    Protein, Urine Random   Result Value Ref Range    Total Protein, Urine Random 156 (H) 5 - 25 mg/dL    Creatinine, Urine Random 82.3 20.0 - 370.0 mg/dL    T. Protein/Creatinine Ratio 1.90 (H) 0.00 - 0.17 mg/mg Creat   Protein, Urine Random   Result Value Ref Range    Total Protein, Urine Random 156 (H) 5 - 25 mg/dL     XR abdomen 1 view    Result Date: 12/6/2023    1.  Redemonstration of bibasilar consolidative opacities concerning for multifocal infectious or inflammatory process. 2. Small left pleural effusion. 3. Nonobstructive bowel gas pattern. 4. Medical devices as above.   I personally reviewed the images/study with Aquiles Tinsley MD (Radiology Resident) and I agree with the findings as stated. This study was interpreted at [facility], [place], [place].   MACRO: None     Dictation workstation:   [id number]    XR chest 1 view    Result Date: 12/6/2023    1.  Redemonstration of bibasilar consolidative opacities concerning for multifocal infectious or inflammatory process. 2. Small left pleural effusion. 3. Nonobstructive bowel gas pattern. 4. Medical devices as  above.      XR chest 1 view    Result Date: 12/5/2023    1.  Satisfactory appearance post venous catheter placement. See discussion above.       MACRO: None   Signed by: Joseph Schoenberger 12/5/2023 4:46 PM Dictation workstation:   [id number]    CT soft tissue neck wo IV contrast    Result Date: 12/5/2023    Subcutaneous emphysema extends from the right of the supraclavicular region and axilla anteriorly and medially to the retropharyngeal or danger space.       CT chest abdomen pelvis wo IV contrast    Result Date: 12/5/2023    Chest 1.  Extensive bilateral pneumonia superimposed upon rather severe emphysema/ D. 2. Possible spiculated mass in the right lower lobe which will warrant post treatment follow-up CT in 1-2 months. 3. There is significant soft tissue gas at the base of the neck predominantly to the right extending in the right supraclavicular region and axilla but not into the mediastinum. 4. Slightly increased size of mediastinal nodes as detailed above. Possibly reactive due to pneumonia.   Abdomen-Pelvis 1.  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 2. Cholelithiasis without evidence for acute cholecystitis.      Transthoracic Echo (TTE) Complete    Result Date: 12/5/2023   CONCLUSIONS:  1. Left ventricular systolic function is normal with a 60% estimated ejection fraction.  2. There is no evidence of mitral valve stenosis.  3. Mild mitral valve regurgitation.  4. Mild tricuspid regurgitation is visualized.  5. Moderate aortic valve stenosis.  6. The aortic valve appears tricuspid with restriction.  7. There is moderate aortic valve cusp calcification.  8. Mild to moderately elevated pulmonary artery pressure.     XR chest 1 view    Result Date: 12/5/2023    1. Support apparatus as detailed above. 2. Increasing left basilar consolidation which may represent a combination  of atelectasis and effusion with aspiration or airspace disease not excluded. 3. Similar streaky right basilar opacities, atelectasis and scarring versus airspace disease.        XR chest 1 view    Result Date: 12/4/2023    1.  Interval development of small left-sided pleural effusion and left retrocardiac opacities, due to atelectasis aspiration, or pneumonia. 2. Chronic interstitial and airspace opacities in the right lower lung. 3. Trachea midline. 4. No pneumothorax. 5. Unremarkable cardiac silhouette.           Assessment/Plan     Daniel Robb is a 83 y.o. male with PMH HTN, COPD (2L PRN at baseline), colon cancer s/p end colostomy, PVD, who presented as a transfer from [place] ED with acute hypoxic/hypercapnic respiratory failure requiring intubation. Patient was initially presented to [place] with hypoxia and started on abx for pneumonia and sepsis. Patient course was complicated by respiratory failure requiring intubation. CT with evidence of soft tissue gas at the base of the amarilis extending in the right supraclavicular region and axilla. Patient transferred to [facility] for further evaluation by ENT/ Thoracic surgery.    Recommendation:  - agree with ENT consult  - Low Suspicion for pneumomediastium at this time. Previous CT (12/5) without extension of subQ emphysema into the mediastinum. No plan for acute intervention at this time  - will follow up repeat CT Chest/Neck this morning  - please call with any questions or concerns    Discussed with Dr. Edison Dick MD  General Surgery Resident  Thoracic Surgery [id number]     1200 addendum:  Repeat CT reviewed with stable appearance and no obvious pneumomediastinum. No acute intervention indicated form thoracic surgery, will defer management to ENT and primary team.  Thoracic surgery will follow peripherally, reach out if additional questions or concerns at [phone].  Thoracic Surgery [id number]

## 2023-12-06 NOTE — DISCHARGE SUMMARY
Discharge Diagnosis  Subcutaneous emphysema   Pneumonia of left lung due to infectious organism, unspecified part of lung  Acute on chronic respiratory failure with hypoxia and hypercapnia  CO2 narcosis  Septic Shock  CAP requiring mechanical ventilation  UTI  COPD with acute exacerbation  CHARY  FTT  Hyperammonemia    Issues Requiring Follow-Up  CT soft tissue neck w/o IV contrast with evidence of soft tissue gas at the base of the neck from the right of the supraventricular region and axilla that extends into the retropharyngeal space. General surgery consulted and recommended ENT eval for possible necrotizing head and neck infection    Test Results Pending At Discharge  Pending Labs       Order Current Status    Staphylococcus aureus/MRSA colonization, Culture In process    Blood Culture Preliminary result    Blood Culture Preliminary result    Extra Tubes Preliminary result    Green Top Preliminary result    Light Blue Top Preliminary result    Respiratory Culture/Smear Preliminary result            Hospital Course   12/4:Patient initially admitted to [facility] from SNF for coincidental finding of hypoxia after EMS called for diffuse pain. Initially admitted under hospital medicine for pneumonia and sepsis and was started on antibiotics (vancomycin, cefepime, levaquin, flagyl, and zoysn).    12/5/23:  Vancomycin discontinued for negative MRSA. Rapid response called for SpO2 in the high 80s on 15L Oxymizer. Patient was unresponsive to verbal or painful stimuli at this time. ABGs with pH 7.12, pCO2 118, and pO2 65. Patient intubated for airway protection and respiratory failure. After intubation, patient became hypotensive and was started on fluids and pressors.    12/5/23: Magdalena placed, central line placed. General surgery consulted after imaging showing evidence of soft tissue gas at the base of the neck extending in the right supraclavicular region and axilla. General surgery recommended urgent bedside evaluation of  the patient by ENT. Due to ENT being unavailable at this time, transfer recommended to [facility] for stat ENT evaluation.  ABGs drawn before transfer PO2 143 and PEEP was decreased from 7 to 5. Plan to obtain repeat CT upon arrival to [facility] for rule out necrotizing fascitis. Transferred in critical but stable condition on mechanical ventilation with sedation fentanyl and precedex and vasopressor support vasopressin and norepi.    Pertinent Physical Exam At Time of Discharge  Physical Exam  Constitutional:       Appearance: He is ill-appearing.      Comments: sedated   HENT:      Head: Normocephalic.   Neck:      Comments: Faint crepitus   Cardiovascular:      Rate and Rhythm: Normal rate.   Pulmonary:      Comments: Intubated, coarse lung sounds  Abdominal:      General: Abdomen is flat.   Skin:     General: Skin is warm and dry.      Coloration: Skin is pale.         Home Medications     Medication List      ASK your doctor about these medications     acetaminophen 325 mg tablet; Commonly known as: Tylenol; Ask about:   Which instructions should I use?   ACIDOPHILUS ORAL   * albuterol 108 (90 Base) MCG/ACT inhaler   * albuterol 2.5 mg /3 mL (0.083 %) nebulizer solution   calcium carbonate-vitamin D3 500 mg-5 mcg (200 unit) tablet   fluticasone propion-salmeteroL 100-50 mcg/dose diskus inhaler; Commonly   known as: Advair Diskus   guaiFENesin 100 mg/5 mL syrup; Commonly known as: Robitussin   hydroCHLOROthiazide 25 mg tablet; Commonly known as: HYDRODiuril   ipratropium 0.02 % nebulizer solution; Commonly known as: Atrovent   lovastatin 40 mg tablet; Commonly known as: Mevacor   * metoprolol tartrate 100 mg tablet; Commonly known as: Lopressor   * metoprolol tartrate 50 mg tablet; Commonly known as: Lopressor   * metoprolol tartrate 100 mg tablet; Commonly known as: Lopressor   oxygen DME/Hospice therapy; Commonly known as: O2   tiotropium 18 mcg inhalation capsule; Commonly known as: Spiriva   traMADol 50 mg tablet;  Commonly known as: Ultram  * This list has 5 medication(s) that are the same as other medications   prescribed for you. Read the directions carefully, and ask your doctor or   other care provider to review them with you.         No future appointments.    Sasha Givens APRN student

## 2023-12-06 NOTE — DISCHARGE SUMMARY
Discharge Diagnosis  Subcutaneous emphysema   Pneumonia of left lung due to infectious organism, unspecified part of lung  Acute on chronic respiratory failure with hypoxia and hypercapnia  CO2 narcosis  Septic Shock  CAP requiring mechanical ventilation  UTI  COPD with acute exacerbation  CHARY  FTT  Hyperammonemia    Issues Requiring Follow-Up  CT soft tissue neck w/o IV contrast with evidence of soft tissue gas at the base of the neck from the right of the supraventricular region and axilla that extends into the retropharyngeal space. General surgery consulted and recommended ENT eval for possible necrotizing head and neck infection     Test Results Pending At Discharge  Pending Labs       Order Current Status    Urine Protein Electrophoresis In process    Urine Protein Electrophoresis In process          XR abdomen 1 view, XR chest 1 view  Narrative: STUDY:  XR CHEST 1 VIEW; XR ABDOMEN 1 VIEW;  12/6/2023 12:37 am      INDICATION:  Signs/Symptoms:resp failure, COPD, intubated; Signs/Symptoms:NG  placement.      COMPARISON:  Chest x-ray dated 12/05/2023 and CT of the chest, abdomen, and pelvis  dated 12/05/2023.      ACCESSION NUMBER(S):  [id number]; [id number]      ORDERING CLINICIAN:  DEE FLORES      FINDINGS:  CHEST X-RAY:      Single AP radiograph of the chest. Single AP radiograph of the  abdomen.      Endotracheal tube with tip located 5.6 cm above the gayla. Left IJ  approach central venous catheter with tip projecting over the  expected position of the superior cavoatrial junction. Enteric tube  with tip projecting over the expected position of the proximal  gastric body and side port projecting in close proximity to the  expected position of the gastroesophageal junction; consider  advancement.      CARDIOMEDIASTINAL SILHOUETTE:  Cardiomediastinal silhouette is normal in size and configuration.      LUNGS:  Redemonstration consolidative bibasilar opacities,  left-greater-than-right and similar  compared to prior chest x-ray.  There is mild blunting of left costophrenic angle. No pneumothorax.  Redemonstration of coarse interstitial markings throughout the lungs  bilaterally. BONES:  No acute osseous changes.      ABDOMINAL X-RAY:      Nonobstructive bowel gas pattern. No evidence of pneumoperitoneum,  limited for evaluation on this semi-erect single radiograph of the  abdomen.      Multiple radiopaque circumscribed masses within the left upper  quadrant corresponding with nephrolithiasis on prior CT.      Impression: 1.  Redemonstration of bibasilar consolidative opacities concerning  for multifocal infectious or inflammatory process.  2. Small left pleural effusion.  3. Nonobstructive bowel gas pattern.  4. Medical devices as above.      I personally reviewed the images/study with Aquiles Tinsley MD (Radiology  Resident) and I agree with the findings as stated. This study was  interpreted at [facility],  [place], [place].      MACRO:  None          Dictation workstation:   [id number]       Hospital Course   12/4:Patient initially admitted to [facility] from SNF for coincidental finding of hypoxia after EMS called for diffuse pain. Initially admitted under hospital medicine for pneumonia and sepsis and was started on antibiotics (vancomycin, cefepime, levaquin, flagyl, and zoysn).     12/5/23:  Vancomycin discontinued for negative MRSA. Rapid response called for SpO2 in the high 80s on 15L Oxymizer. Patient was unresponsive to verbal or painful stimuli at this time. ABGs with pH 7.12, pCO2 118, and pO2 65. Patient intubated for airway protection and respiratory failure. After intubation, patient became hypotensive and was started on fluids and pressors.     12/5/23: Sterling placed, central line placed. General surgery consulted after imaging showing evidence of soft tissue gas at the base of the neck extending in the right supraclavicular region and axilla. General surgery  recommended urgent bedside evaluation of the patient by ENT. Due to ENT being unavailable at this time, transfer recommended to [facility] for stat ENT evaluation.  ABGs drawn before transfer PO2 143 and PEEP was decreased from 7 to 5. Plan to obtain repeat CT upon arrival to [facility] for rule out necrotizing fascitis. Transferred in critical but stable condition on mechanical ventilation with sedation fentanyl and precedex and vasopressor support vasopressin and norepi.    Pertinent Physical Exam At Time of Discharge  Constitutional:       Appearance: He is ill-appearing.      Comments: sedated   HENT:      Head: Normocephalic.   Neck:      Comments: Faint crepitus   Cardiovascular:      Rate and Rhythm: Normal rate.   Pulmonary:      Comments: Intubated, coarse lung sounds  Abdominal:      General: Abdomen is flat.   Skin:     General: Skin is warm and dry.      Coloration: Skin is pale.     Home Medications     Medication List      ASK your doctor about these medications     acetaminophen 325 mg tablet; Commonly known as: Tylenol   guaiFENesin 100 mg/5 mL syrup; Commonly known as: Robitussin   hydroCHLOROthiazide 25 mg tablet; Commonly known as: HYDRODiuril   ipratropium 0.02 % nebulizer solution; Commonly known as: Atrovent   lovastatin 40 mg tablet; Commonly known as: Mevacor   * metoprolol tartrate 100 mg tablet; Commonly known as: Lopressor; Ask   about: Which instructions should I use?   * metoprolol tartrate 100 mg tablet; Commonly known as: Lopressor; Ask   about: Which instructions should I use?   oxygen DME/Hospice therapy; Commonly known as: O2   traMADol 50 mg tablet; Commonly known as: Ultram  * This list has 2 medication(s) that are the same as other medications   prescribed for you. Read the directions carefully, and ask your doctor or   other care provider to review them with you.       Outpatient Follow-Up  No future appointments.    Qiana Mayer, APRN-CNP

## 2023-12-06 NOTE — CONSULTS
ENT DEPARTMENT CONSULTATION NOTE  Name: Daniel Robb  MRN: [id number]  : 1940  Consulting Team: MICU [address]  Reason for Consult: Concern for necrotizing fasciitis versus subcutaneous air    History of Present Illness  The patient is a 83 y.o. male who presented to [facility] on 2023 as transfer from [place] after acute hypoxic respiratory failure in the setting of COPD requiring intubation as well as septic shock on levo.  Pan scan shows CT findings and neck concerning for possible necrotizing process versus subcutaneous air in the retropharynx.    MRSA nares PCR negative.  On Vanco and Zosyn.  Patient is intubated and sedated.  Independent history taking is very limited.        Review of Systems  14 point review of systems completed and all negative except as noted in HPI.    Past Medical History  Past Medical History:   Diagnosis Date    Acute on chronic respiratory failure (CMS/HCC)     Aspiration into respiratory tract     Asthma     Bile duct calculus     Choledocholithiasis 2023    Cholelithiasis 2023    COPD (chronic obstructive pulmonary disease) (CMS/HCC)     Dysphagia     HLD (hyperlipidemia)     Hypertension     On home oxygen therapy     PRN basis    Personal history of other malignant neoplasm of rectum, rectosigmoid junction, and anus     History of rectal cancer    Personal history of urinary calculi     History of nephrolithiasis    Protein calorie malnutrition (CMS/HCC)     PVD (peripheral vascular disease) (CMS/HCC) 2023       Past Surgical History  Past Surgical History:   Procedure Laterality Date    BILE DUCT STENT PLACEMENT      COLECTOMY PARTIAL / TOTAL      Partial with end colostomy    ERCP      bile duct stent removal    JOINT REPLACEMENT Right     Hip    TONSILLECTOMY         Allergies  No Known Allergies    Medications    Current Facility-Administered Medications:     polyethylene glycol (Glycolax, Miralax) packet  - Omnicell Override Pull, , , ,      albuterol 2.5 mg /3 mL (0.083 %) nebulizer solution 2.5 mg, 2.5 mg, nebulization, q4h PRN, Sandra Guerra MD    clindamycin in D5W (Cleocin) IVPB 900 mg, 900 mg, intravenous, q8h, Ilan Campuzano MD, Stopped at 12/06/23 0550    dexmedeTOMIDine in NS (Precedex) 400 mcg in 100 mL (4 mcg/mL) infusion, 0.1-1.5 mcg/kg/hr, intravenous, Continuous, Ilan Campuzano MD, Stopped at 12/06/23 0900    fentanyl (Sublimaze) 1000 mcg in sodium chloride 0.9% 100 mL (10 mcg/mL) infusion (premix),  mcg/hr, intravenous, Continuous, Ilan Campuzano MD, Last Rate: 7.5 mL/hr at 12/06/23 0800, 75 mcg/hr at 12/06/23 0800    hydrocortisone sod succ (PF) (Solu-CORTEF) 50 mg in sodium chloride 0.9% 50 mL IVPB, 50 mg, intravenous, q6h, Ilan Campuzano MD, Stopped at 12/06/23 0912    norepinephrine (Levophed) 8 mg in dextrose 5% 250 mL (0.032 mg/mL) infusion (premix), 0.01-1 mcg/kg/min, intravenous, Continuous, Ilan Campuzano MD, Last Rate: 6.23 mL/hr at 12/06/23 0800, 0.06 mcg/kg/min at 12/06/23 0800    oxygen (O2) therapy, , inhalation, Continuous PRN - O2/gases, Ilan Campuzano MD    pantoprazole (ProtoNix) injection 40 mg, 40 mg, intravenous, Daily before breakfast, Aleida Segura MD, 40 mg at 12/06/23 0839    piperacillin-tazobactam-dextrose (Zosyn) IV 3.375 g, 3.375 g, intravenous, q6h, Ilan Campuzano MD, Stopped at 12/06/23 0909    Family History  Family History   Problem Relation Name Age of Onset    Heart disease Mother      Heart disease Father         Social History  Social History     Socioeconomic History    Marital status:      Spouse name: Not on file    Number of children: Not on file    Years of education: Not on file    Highest education level: Not on file   Occupational History    Not on file   Tobacco Use    Smoking status: Former     Types: Cigarettes     Passive exposure: Never    Smokeless tobacco: Never   Vaping Use    Vaping Use: Not on file   Substance and Sexual  Activity    Alcohol use: Never    Drug use: Never    Sexual activity: Never   Other Topics Concern    Not on file   Social History Narrative    Not on file     Social Determinants of Health     Financial Resource Strain: Unknown (12/5/2023)    Overall Financial Resource Strain (CARDIA)     Difficulty of Paying Living Expenses: Patient refused   Food Insecurity: Not on file   Transportation Needs: Unknown (12/5/2023)    PRAPARE - Transportation     Lack of Transportation (Medical): Patient refused     Lack of Transportation (Non-Medical): Patient refused   Physical Activity: Not on file   Stress: Not on file   Social Connections: Not on file   Intimate Partner Violence: Not on file   Housing Stability: Unknown (12/5/2023)    Housing Stability Vital Sign     Unable to Pay for Housing in the Last Year: Patient refused     Number of Places Lived in the Last Year: 0     Unstable Housing in the Last Year: Patient refused       Vital Signs  Vitals:    12/06/23 1100   BP:    Pulse: 50   Resp: 19   Temp:    SpO2: 100%       Physical Examination  GEN: Intubated and sedated  VOICE: Intubated  RESP: Intubated and sedated with bilateral chest rise  CV: Clinically well perfused   EYES: No scleral icterus, eyes reactive to light  NEURO: Limited due to intubation and sedation  HEAD: Scalp is normocephalic and atraumatic  FACE: No abrasions or lacerations, no maxillary or mandibular stepoffs  EARS: Normal external ears  NOSE: External nose appears normal, no significant septal deviation, anterior rhinoscopy limited with no active bleeding or lesions  OC: Limited due to orotracheal intubation.  Normal lips, normal buccal mucosa, normal alveolar ridge, normal floor of mouth, normal tongue, normal hard palate, normal retromolar trigone  NECK: Trachea midline, no significant lymphadenopathy.  No palpable crepitus.  No overlying skin changes or necrotic tissue noted.    Laboratory and Data  Results for orders placed or performed during  the hospital encounter of 12/05/23 (from the past 24 hour(s))   POCT GLUCOSE   Result Value Ref Range    POCT Glucose 165 (H) 74 - 99 mg/dL   CBC and Auto Differential   Result Value Ref Range    WBC 24.7 (H) 4.4 - 11.3 x10*3/uL    nRBC 0.0 0.0 - 0.0 /100 WBCs    RBC 3.60 (L) 4.50 - 5.90 x10*6/uL    Hemoglobin 11.1 (L) 13.5 - 17.5 g/dL    Hematocrit 32.3 (L) 41.0 - 52.0 %    MCV 90 80 - 100 fL    MCH 30.8 26.0 - 34.0 pg    MCHC 34.4 32.0 - 36.0 g/dL    RDW 13.6 11.5 - 14.5 %    Platelets 246 150 - 450 x10*3/uL    Neutrophils % 94.1 40.0 - 80.0 %    Immature Granulocytes %, Automated 0.7 0.0 - 0.9 %    Lymphocytes % 1.7 13.0 - 44.0 %    Monocytes % 3.4 2.0 - 10.0 %    Eosinophils % 0.0 0.0 - 6.0 %    Basophils % 0.1 0.0 - 2.0 %    Neutrophils Absolute 23.20 (H) 1.60 - 5.50 x10*3/uL    Immature Granulocytes Absolute, Automated 0.18 0.00 - 0.50 x10*3/uL    Lymphocytes Absolute 0.41 (L) 0.80 - 3.00 x10*3/uL    Monocytes Absolute 0.85 (H) 0.05 - 0.80 x10*3/uL    Eosinophils Absolute 0.00 0.00 - 0.40 x10*3/uL    Basophils Absolute 0.03 0.00 - 0.10 x10*3/uL   Comprehensive Metabolic Panel   Result Value Ref Range    Glucose 175 (H) 74 - 99 mg/dL    Sodium 140 136 - 145 mmol/L    Potassium 2.9 (LL) 3.5 - 5.3 mmol/L    Chloride 99 98 - 107 mmol/L    Bicarbonate 28 21 - 32 mmol/L    Anion Gap 16 10 - 20 mmol/L    Urea Nitrogen 37 (H) 6 - 23 mg/dL    Creatinine 1.43 (H) 0.50 - 1.30 mg/dL    eGFR 48 (L) >60 mL/min/1.73m*2    Calcium 8.3 (L) 8.6 - 10.6 mg/dL    Albumin 2.5 (L) 3.4 - 5.0 g/dL    Alkaline Phosphatase 68 33 - 136 U/L    Total Protein 6.8 6.4 - 8.2 g/dL    AST 17 9 - 39 U/L    Bilirubin, Total 0.7 0.0 - 1.2 mg/dL    ALT 6 (L) 10 - 52 U/L   Coagulation Screen   Result Value Ref Range    Protime 20.8 (H) 9.8 - 12.8 seconds    INR 1.8 (H) 0.9 - 1.1    aPTT 32 27 - 38 seconds   Lactate   Result Value Ref Range    Lactate 2.1 (H) 0.4 - 2.0 mmol/L   Magnesium   Result Value Ref Range    Magnesium 1.76 1.60 - 2.40 mg/dL    Creatine Kinase   Result Value Ref Range    Creatine Kinase 64 0 - 325 U/L   Lactate Dehydrogenase   Result Value Ref Range     (H) 84 - 246 U/L   Vancomycin   Result Value Ref Range    Vancomycin 10.6 5.0 - 20.0 ug/mL   Blood Gas Arterial Full Panel   Result Value Ref Range    POCT pH, Arterial 7.52 (H) 7.38 - 7.42 pH    POCT pCO2, Arterial 36 (L) 38 - 42 mm Hg    POCT pO2, Arterial 103 (H) 85 - 95 mm Hg    POCT SO2, Arterial 99 94 - 100 %    POCT Oxy Hemoglobin, Arterial 97.1 94.0 - 98.0 %    POCT Hematocrit Calculated, Arterial 34.0 (L) 41.0 - 52.0 %    POCT Sodium, Arterial 137 136 - 145 mmol/L    POCT Potassium, Arterial 2.9 (LL) 3.5 - 5.3 mmol/L    POCT Chloride, Arterial 101 98 - 107 mmol/L    POCT Ionized Calcium, Arterial 1.12 1.10 - 1.33 mmol/L    POCT Glucose, Arterial 193 (H) 74 - 99 mg/dL    POCT Lactate, Arterial 2.1 (H) 0.4 - 2.0 mmol/L    POCT Base Excess, Arterial 6.3 (H) -2.0 - 3.0 mmol/L    POCT HCO3 Calculated, Arterial 29.4 (H) 22.0 - 26.0 mmol/L    POCT Hemoglobin, Arterial 11.3 (L) 13.5 - 17.5 g/dL    POCT Anion Gap, Arterial 10 10 - 25 mmo/L    Patient Temperature 37.0 degrees Celsius    FiO2 40 %   Type and screen   Result Value Ref Range    ABO TYPE O     Rh TYPE POS     ANTIBODY SCREEN NEG    Protein, Urine Random   Result Value Ref Range    Total Protein, Urine Random 156 (H) 5 - 25 mg/dL    Creatinine, Urine Random 82.3 20.0 - 370.0 mg/dL    T. Protein/Creatinine Ratio 1.90 (H) 0.00 - 0.17 mg/mg Creat   Protein, Urine Random   Result Value Ref Range    Total Protein, Urine Random 156 (H) 5 - 25 mg/dL   POCT GLUCOSE   Result Value Ref Range    POCT Glucose 152 (H) 74 - 99 mg/dL   Lactate   Result Value Ref Range    Lactate 1.9 0.4 - 2.0 mmol/L   CBC and Auto Differential   Result Value Ref Range    WBC 21.1 (H) 4.4 - 11.3 x10*3/uL    nRBC 0.0 0.0 - 0.0 /100 WBCs    RBC 3.43 (L) 4.50 - 5.90 x10*6/uL    Hemoglobin 10.3 (L) 13.5 - 17.5 g/dL    Hematocrit 32.3 (L) 41.0 - 52.0 %     MCV 94 80 - 100 fL    MCH 30.0 26.0 - 34.0 pg    MCHC 31.9 (L) 32.0 - 36.0 g/dL    RDW 13.9 11.5 - 14.5 %    Platelets 242 150 - 450 x10*3/uL    Neutrophils % 91.8 40.0 - 80.0 %    Immature Granulocytes %, Automated 0.9 0.0 - 0.9 %    Lymphocytes % 3.5 13.0 - 44.0 %    Monocytes % 3.7 2.0 - 10.0 %    Eosinophils % 0.0 0.0 - 6.0 %    Basophils % 0.1 0.0 - 2.0 %    Neutrophils Absolute 19.38 (H) 1.60 - 5.50 x10*3/uL    Immature Granulocytes Absolute, Automated 0.19 0.00 - 0.50 x10*3/uL    Lymphocytes Absolute 0.75 (L) 0.80 - 3.00 x10*3/uL    Monocytes Absolute 0.79 0.05 - 0.80 x10*3/uL    Eosinophils Absolute 0.00 0.00 - 0.40 x10*3/uL    Basophils Absolute 0.02 0.00 - 0.10 x10*3/uL   Renal Function Panel   Result Value Ref Range    Glucose 165 (H) 74 - 99 mg/dL    Sodium 141 136 - 145 mmol/L    Potassium 3.5 3.5 - 5.3 mmol/L    Chloride 102 98 - 107 mmol/L    Bicarbonate 28 21 - 32 mmol/L    Anion Gap 15 10 - 20 mmol/L    Urea Nitrogen 37 (H) 6 - 23 mg/dL    Creatinine 1.41 (H) 0.50 - 1.30 mg/dL    eGFR 49 (L) >60 mL/min/1.73m*2    Calcium 8.4 (L) 8.6 - 10.6 mg/dL    Phosphorus 3.5 2.5 - 4.9 mg/dL    Albumin 2.2 (L) 3.4 - 5.0 g/dL   Magnesium   Result Value Ref Range    Magnesium 2.32 1.60 - 2.40 mg/dL   Protein, Total   Result Value Ref Range    Total Protein 6.0 (L) 6.4 - 8.2 g/dL   Blood Gas Arterial Full Panel   Result Value Ref Range    POCT pH, Arterial 7.49 (H) 7.38 - 7.42 pH    POCT pCO2, Arterial 39 38 - 42 mm Hg    POCT pO2, Arterial 115 (H) 85 - 95 mm Hg    POCT SO2, Arterial 99 94 - 100 %    POCT Oxy Hemoglobin, Arterial 97.5 94.0 - 98.0 %    POCT Hematocrit Calculated, Arterial 33.0 (L) 41.0 - 52.0 %    POCT Sodium, Arterial 136 136 - 145 mmol/L    POCT Potassium, Arterial 3.4 (L) 3.5 - 5.3 mmol/L    POCT Chloride, Arterial 102 98 - 107 mmol/L    POCT Ionized Calcium, Arterial 1.15 1.10 - 1.33 mmol/L    POCT Glucose, Arterial 177 (H) 74 - 99 mg/dL    POCT Lactate, Arterial 1.9 0.4 - 2.0 mmol/L    POCT  Base Excess, Arterial 5.9 (H) -2.0 - 3.0 mmol/L    POCT HCO3 Calculated, Arterial 29.7 (H) 22.0 - 26.0 mmol/L    POCT Hemoglobin, Arterial 11.1 (L) 13.5 - 17.5 g/dL    POCT Anion Gap, Arterial 8 (L) 10 - 25 mmo/L    Patient Temperature 37.0 degrees Celsius    FiO2 40 %   POCT GLUCOSE   Result Value Ref Range    POCT Glucose 154 (H) 74 - 99 mg/dL       Radiology Reviewed  I have personally reviewed the CT neck and chest and agree with the impression  IMPRESSION:  Subcutaneous emphysema extends from the right of the supraclavicular  region and axilla anteriorly and medially to the retropharyngeal or  danger space.      Assessment  Daniel Robb is a 83 y.o. male who is presenting as a transfer from [place] for acute hypoxic respiratory failure in the setting of sepsis on levo and found to have subcutaneous air in the retropharynx and deep neck space.  ENT consulted for management and rule out of necrotizing fasciitis.  Exam without palpable crepitus or necrosis noted.  No fluctuance.    Recommendations  -Repeat CT neck stat to examine for interval change of deep neck space    Note not final until signed by an attending.     Tho Brown, PGY2  I am the night consult resident. I can only be contacted from 5:30pm to 5am M-F. Page on weekends or off hours.   Otolaryngology - Head & Neck Surgery  ENT Consult pager: [phone]  Peds pager: [phone]  Adult Head & Neck Phone: [phone]  ENT subspecialty team: Alexa individual resident who wrote today's note  Please page if urgent      STAFFING UPDATE:  Seen with attending. Imaging reviewed with small, stable air appreciated, likely secondary to intubation trauma or positive pressure. No crepitus on exam. No need for repeat imaging from ENT perspective unless change in clinical picture.     Recs:  - No acute ENT intervention indicated at this time.   - Please re-engage when extubated for ENT re-eval at that time    Patient seen and discussed with attending Dr. Callejas who agrees  with plan.     Rosana Vaughan MD   PGY-1  Otolaryngology - Head & Neck Surgery  [facility]    ENT Consult Pager: [phone]  Head and Neck Phone: [phone]  ENT Peds Pager: [phone]  ENT Subspecialty Team: Alexa

## 2023-12-06 NOTE — CARE PLAN
Problem: Safety - Medical Restraint  Goal: Remains free of injury from restraints (Restraint for Interference with Medical Device)  Outcome: Progressing     The patient's goals for the shift include unable to communicate due to intubation     The clinical goals for the shift include maintain MAP greater than 65

## 2023-12-06 NOTE — NURSING NOTE
21:56 Patient's son Dayton Robb updated on patient condition and plan to transfer to [facility] MICU.     22:30 Patient left unit with critical care transport.    22:31 Report called to NADIYA Kenny in [facility] MICU.

## 2023-12-06 NOTE — H&P
MEDICAL INTENSIVE CARE UNIT  ADMISSION H&P    Subjective   HPI:  Daniel Robb is a 83 y.o. male with HTN, COPD (2L baseline), colon cancer s/p partial colectomy with end colostomy, PVD who presents as a transfer from [place] ED with AHRF requiring intubation. Transfer initiated for CT findings concerning for upper chest/neck necrotizing process requiring urgent ENT/thoracic surgery evaluation.     Unable to obtain history from patient due to mental status.  History obtained by chart review. Unable to contact family.    Brought in from SNF on 12/4. He was complaining of R hip pain when moving. When EMS arrived his SpO2 was 85% on room air. He has had some mild/mod SOB for a while (unclear timeline) prior to arrival. Also had some diffuse chest pain for unclear amount of time. Denies any cough, trouble swallowing, fevers, chills, runny nose, sore throat, vomiting or diarrhea.     In the SNF, he takes boost TID and has a regular diet.      OSH Course (12/4-12/5):  Triage vitals:   , RR 22, /61, 93% on RA    Initial studies  CMP: bicarb 36 (chronic), Cr 1.4 (bl 1.0), BUN 28, alb 3.2, Tprot 8.5, gamma gap 5.3, Tbili 1.7  CBC: WBC 39 (neutrophilic, elevated basophils 110), hb 14, plt 290  , trop 21 -> 32 -> 27  UA: > 50 WBC, no RBCs, 2+ prot  UCx with multiple organisms, possible contamination   BCx NGTD x1  Sputum culture: few GPCs  Procal 0.8 -> 0.7  Lactate 2.7 -> 1.6    Chest XR 12/4  IMPRESSION:  1.  Interval development of small left-sided pleural effusion and  left retrocardiac opacities, due to atelectasis aspiration, or  pneumonia.  2. Chronic interstitial and airspace opacities in the right lower  lung.  3. Trachea midline.  4. No pneumothorax.  5. Unremarkable cardiac silhouette.    He was initially admitted to the floor and started on vanc/zosyn. Overnight 12/4-5 had rapid response for hypoxemia and unresponsiveness, ABG at the time showed 7.12/118/65 -> intubated and tx to ICU. He was  hypotensive as well, started on levo and given 1.5L IVF.     CT cap wo contrast 12/5:  IMPRESSION:  Chest  1.  Extensive bilateral pneumonia superimposed upon rather severe  emphysema/COPD.  2. Possible spiculated mass (14 x 9 mm) in the right lower lobe which will  warrant post treatment follow-up CT in 1-2 months.  3. There is significant soft tissue gas at the base of the neck  predominantly to the right extending in the right supraclavicular  region and axilla but not into the mediastinum.  4. Slightly increased size of mediastinal nodes as detailed above.  Possibly reactive due to pneumonia.      Abdomen-Pelvis  1.  Multiple left renal calculus to include staghorn stone in the  left kidney which results in mild hydronephrosis though the degree of  this is unchanged from the prior CT.  2. Cholelithiasis without evidence for acute cholecystitis.      ENT urgently consulted overnight for CT findings. They feel it is possible pharyngeal cellulitis and there is concern for nec fasc. Could also be ruptured emphysema bleb. They recommended dedicated CT neck (below).     CT neck soft tissue wo contrast 12/5  IMPRESSION:  Subcutaneous emphysema extends from the right of the supraclavicular  region and axilla anteriorly and medially to the retropharyngeal or  danger space.    He was then transferred to [facility] MICU for urgent ENT and thoracic surgery evaluations and continued ICU management.       TTE 12/5 - done for acute hypotension  CONCLUSIONS:   1. Left ventricular systolic function is normal with a 60% estimated ejection fraction.   2. There is no evidence of mitral valve stenosis.   3. Mild mitral valve regurgitation.   4. Mild tricuspid regurgitation is visualized.   5. Moderate aortic valve stenosis.   6. The aortic valve appears tricuspid with restriction.   7. There is moderate aortic valve cusp calcification.   8. Mild to moderately elevated pulmonary artery pressure.      Medical History:  PMH:  above    Medications: reviewed SNF records    Allergies: NKDA per SNF paperwork  PSH: reviewed, most importantly a distal partial colectomy with end ileostomy  FamHx: unable to obtain due to sedation     SocHx:   Home: came from [facility]  Substance use:   -Alcohol: unknown  -Tobacco: none per last PCP note  -Recreational drugs: unknown    ROS: unable to obtain due to sedation             Objective   Vitals: reviewed  Exam:  Gen: ill-appearing, turns to voice and looks around, on sedation  Head and neck: NCAT, neck supple without LAD, no crepitus or skin findings  HEENT: MM dry, intubated, normal nose without congestion, OG in place  CV: RRR no mrg  Pulm: ventilator sounds, no wheezing or crackles  Abd: scaphoid, soft, non-tender, non-distended  Ext: WWP, no LE edema  Neuro: normal tone, face symmetric, squeezes my hand when instructed         Assessment/Plan   83 y.o. male with HTN, COPD (2L baseline), colon cancer s/p partial colectomy with end colostomy, and PVD who presents as a transfer from [place] ED with AHRF requiring intubation and septic shock on pressors. Transfer initiated for CT findings concerning for necrotizing process of the upper chest/neck requiring urgent ENT/thoracic surgery evaluation.     CNS  #Sedation  -fentanyl and precedex gtts    PULM  #Acute on chronic mixed resp failure  #Pneumonia causing septic shock  #COPD on 2L baseline  -Vent: 18/450/40%/5  -discontinue solumedrol, no wheezing on exam  -continue abx as below    #RLL spiculated mass, 14 x 9 mm   -possibly cancerous, could explain weight loss  -fu CT in 1-2 months    CV  #Septic shock  -continue levo and vaso, MAP goal > 65  -formal TTE 12/5: EF 60%, IVC dilated  -stress dose hydrocort continued from OSH, wean as able  -straight leg raise increased BP, will give 500cc bolus now    ENT/Thoracic  #Subcutaneous emphysema near/in the danger space  #Mediastinal LAD  -CT chest read: significant soft tissue gas at the base of the  neck  predominantly to the right extending in the right supraclavicular  region and axilla but not into the mediastinum  -CT neck: Subcutaneous emphysema extends from the right of the supraclavicular  region and axilla anteriorly and medially to the retropharyngeal or  danger space  -ENT and thoracic surgery consults  [ ] repeat CT neck and chest STAT    ID  #Septic shock  -source(s): PNA, UTI, possible necrotizing neck/chest process  -UCX mixed probable contaminant   -MRSA nares PCR neg, Urine strep and legionella neg, procal 0.8  -WBC downtrending   -vanc for possible nec fas in danger space  -zosyn/azithro for CAP  -continue clinda while nec fas is still on the ddx    FEN/GI  NPO while intubated and on pressors    #Protein calorie malnutrition  -ddx: lung cancer, other malignancy (elevated protein gap), and/or end stage COPD  -nutrition consult  -OG in place    RENAL/  #CHARY  -bl 1.0, peak 1.7, 1.4 on admit to [facility]  -suspect septic shock    #Urinary tract infection  #Staghorn calculus on L, mild hydronephrosis  -consider urology consult if not improving on abx, may be unreachable source of infection   -UCx pending     HEME/ONC  #Acute anemia  -hb 11 on arrival, bl ~13  -low likelihood for hemolysis: LDH barely elevated, no abnormalities reported on smear/diff  -not DIC, aptt wnl  -elevated INR may be nutritional deficiency, no hx of liver disease  -most likely due to septic shock    #Elevated gamma gap  -SPEP, UPEP    MSK/Skin  #Pressure wounds   -wound care consult      FEN: NPO  A: art line, LIJ CVC  O2: vent - 40%  Drips: levo, vaso  Abx: vanc/zosyn/azithro/clinda  DVT PPx: SQH  GI PPx: none      CODE STATUS: FULL (confirmed on SNF paperwork)  SURROGATE DECISION MAKER: Dai, sister-in-law noted in [facility] paperwork, called [phone] but phone out of service.   [place] (child) [phone] not in service    Ilan Campuzano MD  Resident, PGY-3

## 2023-12-06 NOTE — PROGRESS NOTES
"  Wound Care Progress Note     Visit Date: 12/6/2023      Patient Name: Daniel Robb         MRN: [id number]                Reason for Visit: Sacrum/ LUIS ARMANDO anterior knees/ Right ischium/ Colostomy check and pouch change      Wound location: LUIS ARMANDO knees and Sacrum      Undermining: no  Tracking: no  Wound type: Healing wounds   Wound bed: Small <1cm dry intact scabs   Draining: none  Periwound skin: Clean, dry, and intact   Therapeutic surface: Pulmonary progressa    Recommendations: Daily      Assess skin keep clean and dry     Leave LUIS ARMANDO knees open to air      May apply a sacral Mepilex border foam to sacrum for protection.  Check under every shift and as needed.  Change as needed and as soiled.       Wound location: Right ischium    Undermining: no  Tracking: no  Wound type: Stage 3 pressure injury  Wound bed: full thickness with yellow fibrinous tissue at the base  Draining: serous fluid  Periwound skin: Dry intact blanchable erythema    Recommendations: DAILY      Irrigate with normal saline or wound cleanser      Cover wound bed with Medihoney      Apply Aquacel Extra      Cover with Mepilex border dressing     While in bed patient should only be on one EHOB air mattress overlay, a fitted sheet, and one EHOB repositioning sheet with appropriate white chux. Please do not use brief while patient is resting in bed. Turn and reposition patient at least every 2 hours.      Ostomy type: Colostomy        Size: 7/8\"     Color: red   Protruding: budded  Functioning: thin brown stool  Mucocutaneous junction: Clean, dry, and intact   Peristomal skin: Clean, dry, and intact  Pouching: Barrier ring (thinned) and a Srinivasa flat 1 piece pouch applied  Ostomy Education:  The patient is knowledgeable in ostomy care, but needs assistance while inpatient.   Plan: Assess stoma/pouching Twice a week and as needed.     Wound Assessment:  Wound 12/05/23 Pressure Injury Buttocks Right (Active)   Date First Assessed/Time First " Assessed: 12/05/23 0100   Present on Original Admission: Yes  Primary Wound Type: Pressure Injury  Location: Buttocks  Wound Location Orientation: Right      Assessments 12/6/2023  4:27 PM   Site Assessment Fibrinous;Red   Kathleen-Wound Assessment Blanchable erythema   Pressure Injury Stage Stage 3   Wound Length (cm) 4 cm   Wound Width (cm) 2 cm   Wound Surface Area (cm^2) 8 cm^2   State of Healing Slough   Drainage Description Serous   Drainage Amount Scant   Dressing Hydrofiber;Silicone border dressing   Dressing Changed New   Dressing Status Clean;Dry       Active Orders   Date Order Priority Status Authorizing Provider   12/06/23 0034 Inpatient Consult to Wound and Ostomy Nurse Routine Active David Street MD     - Reason for Consult?:    Wound     Colostomy LLQ (Active)   Earliest Known Present: 12/05/23   Location: LLQ   Number of days: 1         Colostomy LLQ (Active)   Stomal Appliance 2 piece;Changed 12/06/23 1631   Site/Stoma Assessment Clean;Intact 12/06/23 1631   Peristomal Assessment Clean;Intact 12/06/23 1631   Treatment Pouch change 12/06/23 1631   Drainage Characteristics Brown 12/06/23 1631   Output (mL) 50 mL 12/06/23 0000     Wound Team Plan: Primary provider, please review recommendation. If you agree with recommendation please enter as wound orders in EMR. Thank you.  Ostomy team will follow patient for colostomy care while inpatient.      While inpatient, Secure chat with questions or if condition changes. For urgent communications please page the wound care team at [phone].      Anahy Hilton RN, CWON  12/6/2023  4:31 PM

## 2023-12-06 NOTE — PROGRESS NOTES
"Vancomycin Dosing by Pharmacy- INITIAL    Daniel Robb is a 83 y.o. year old male who Pharmacy has been consulted for vancomycin dosing for cellulitis, skin and soft tissue. Based on the patient's indication and renal status this patient will be dosed based on a goal trough/random level of 15-20.     Renal function is currently poor. Patient has CHARY (improving) and will be dosed by levels.    Visit Vitals  /55   Pulse (!) 42   Temp 35.6 °C (96.1 °F) (Temporal)   Resp 16        Lab Results   Component Value Date    CREATININE 1.43 (H) 12/05/2023    CREATININE 1.72 (H) 12/05/2023    CREATININE 1.42 (H) 12/04/2023    CREATININE 1.40 (H) 12/04/2023        Patient weight is No results found for: \"PTWEIGHT\"    No results found for: \"CULTURE\"     No intake/output data recorded.  [unfilled]    Lab Results   Component Value Date    PATIENTTEMP 37.0 12/06/2023    PATIENTTEMP  12/05/2023      Comment:      NOTE: Patient Results are Not Corrected for Temperature    PATIENTTEMP  12/05/2023      Comment:      NOTE: Patient Results are Not Corrected for Temperature          Assessment/Plan     Patient received 1000mg yesterday morning. Level came back at 10mcg/mL. Ordered 750mg once.  Follow-up level will be drawn tomorrow morning.  Will continue to monitor renal function daily while on vancomycin and order serum creatinine at least every 48 hours if not already ordered.  Follow for continued vancomycin needs, clinical response, and signs/symptoms of toxicity.       Delfin Zarate, PharmD       "

## 2023-12-06 NOTE — HOSPITAL COURSE
[facility] MEDICAL COURSE:  12/4:Patient initially admitted to [facility] from [facility] for coincidental finding of hypoxia after EMS called for diffuse pain. Initially admitted under hospital medicine for pneumonia and sepsis and was started on antibiotics (vancomycin, cefepime, levaquin, flagyl, and zoysn).     12/5/23:  Vancomycin discontinued for negative MRSA. Rapid response called for SpO2 in the high 80s on 15L Oxymizer. Patient was unresponsive to verbal or painful stimuli at this time. ABGs with pH 7.12, pCO2 118, and pO2 65. Patient intubated for airway protection and respiratory failure. After intubation, patient became hypotensive and was started on fluids and pressors.     12/5/23: Magdalena placed, central line placed. General surgery consulted after imaging showing evidence of soft tissue gas at the base of the neck extending in the right supraclavicular region and axilla. General surgery recommended urgent bedside evaluation of the patient by ENT. Due to ENT being unavailable at this time, transfer recommended to [facility] for stat ENT evaluation.  ABGs drawn before transfer PO2 143 and PEEP was decreased from 7 to 5. Plan to obtain repeat CT upon arrival to [facility] for rule out necrotizing fascitis. Transferred in critical but stable condition on mechanical ventilation with sedation fentanyl and precedex and vasopressor support vasopressin and norepi.    [facility] MEDICAL COURSE:  12/6/23: Pt was rescanned with CT neck / chest STAT. No acute changes. ENT / thoracic surg - no interventions. Pt was weaned off Precedex/fentanyl drip. Unable to pass SBT today.     12/7 - extubated and off sedation. ENT re-evaluated the patient after extubation.     12/8 - pt was scoped by ENT. No acute findings. Worsening oxygen demand and on Airvo now.

## 2023-12-06 NOTE — CONSULTS
Reason For Consult  NSTI of neck    History Of Present Illness  Daniel Robb is a 83 y.o. male presenting with septic shock.  In brief, this is a gentleman with a PMHx significant for COPD on home O2, HTN, rectal cancer, PVD who presented to [facility] afebrile, tachycardic to 123, in respiratory distress.  WBC 38.9K.  Patient was intubated and admitted to ICU for presumed septic shock secondary to pneumonia.  Once stabilized, the patient had further workup demonstrating subcutaneous emphysema in the neck and RUE.  General surgery was consulted for further workup and management.  Unable to talk with patient on exam due to being intubated     Past Medical History  He has a past medical history of Acute on chronic respiratory failure (CMS/HCC), Aspiration into respiratory tract, Asthma, Bile duct calculus, Choledocholithiasis (01/23/2023), Cholelithiasis (01/20/2023), COPD (chronic obstructive pulmonary disease) (CMS/[facility]), Dysphagia, HLD (hyperlipidemia), Hypertension, On home oxygen therapy, Personal history of other malignant neoplasm of rectum, rectosigmoid junction, and anus, Personal history of urinary calculi, Protein calorie malnutrition (CMS/[facility]), and PVD (peripheral vascular disease) (CMS/[facility]) (01/20/2023).    He has no past medical history of AAA (abdominal aortic aneurysm) (CMS/[facility]).    Surgical History  He has a past surgical history that includes ERCP; Colectomy partial / total; Tonsillectomy; Joint replacement (Right); and Bile duct stent placement.     Social History  He reports that he has quit smoking. His smoking use included cigarettes. He has never been exposed to tobacco smoke. He has never used smokeless tobacco. He reports that he does not drink alcohol and does not use drugs.    Family History  Family History   Problem Relation Name Age of Onset    Heart disease Mother      Heart disease Father          Allergies  Patient has no known allergies.    Review of Systems  Unable to  "obtain     Physical Exam  Gen: Intubated, sedated  Neck: Subcutaneous emphysema in right neck, skin intact, no emphysema in RUE that was palpable, no masses, erythema, induration, or draining  Chest: No palpable subcu emphysema     Last Recorded Vitals  Blood pressure 102/56, pulse 68, temperature 36.6 °C (97.9 °F), temperature source Temporal, resp. rate 20, height 1.88 m (6' 2\"), weight 55.4 kg (122 lb 2.2 oz), SpO2 100 %.    Relevant Results  Component      Latest Ref Rng 12/4/2023   WBC      4.4 - 11.3 x10*3/uL 38.9 (H)    nRBC      0.0 - 0.0 /100 WBCs 0.0    RBC      4.50 - 5.90 x10*6/uL 4.66    HEMOGLOBIN      13.5 - 17.5 g/dL 14.3    HEMATOCRIT      41.0 - 52.0 % 44.6    MCV      80 - 100 fL 96    MCH      26.0 - 34.0 pg 30.7    MCHC      32.0 - 36.0 g/dL 32.1    RED CELL DISTRIBUTION WIDTH      11.5 - 14.5 % 13.9    Platelets      150 - 450 x10*3/uL 292    Neutrophils %      40.0 - 80.0 % 90.9    Immature Granulocytes %, Automated      0.0 - 0.9 % 1.1 (H)    Lymphocytes %      13.0 - 44.0 % 2.2    Monocytes %      2.0 - 10.0 % 5.5    Eosinophils %      0.0 - 6.0 % 0.0    Basophils %      0.0 - 2.0 % 0.3    Neutrophils Absolute      1.60 - 5.50 x10*3/uL 35.36 (H)    Immature Granulocytes Absolute, Automated      0.00 - 0.50 x10*3/uL 0.44    Lymphocytes Absolute      0.80 - 3.00 x10*3/uL 0.85    Monocytes Absolute      0.05 - 0.80 x10*3/uL 2.15 (H)    Eosinophils Absolute      0.00 - 0.40 x10*3/uL 0.00    Basophils Absolute      0.00 - 0.10 x10*3/uL 0.11 (H)    POCT pH, Arterial      7.38 - 7.42 pH 7.36 (L)    POCT pCO2, Arterial      38 - 42 mm Hg 67 (H)    POCT pO2, Arterial      85 - 95 mm Hg 146 (H)    POCT SO2, Arterial      94 - 100 % 99    POCT Oxy Hemoglobin, Arterial      94.0 - 98.0 % 96.2    POCT Hematocrit Calculated, Arterial      41.0 - 52.0 % 41.0    POCT Sodium, Arterial      136 - 145 mmol/L 137    POCT Potassium, Arterial      3.5 - 5.3 mmol/L 3.6    POCT Chloride, Arterial      98 - 107 " mmol/L 98    POCT Ionized Calcium, Arterial      1.10 - 1.33 mmol/L 1.16    POCT Glucose, Arterial      74 - 99 mg/dL 113 (H)    POCT Lactate, Arterial      0.4 - 2.0 mmol/L 1.6    POCT Base Excess, Arterial      -2.0 - 3.0 mmol/L 9.7 (H)    POCT HCO3 Calculated, Arterial      22.0 - 26.0 mmol/L 37.9 (H)    POCT Hemoglobin, Arterial      13.5 - 17.5 g/dL 13.6    POCT Anion Gap, Arterial      10 - 25 mmo/L 5 (L)    Patient Temperature --    FiO2      % 15    Critical Called By    Critical Called To    Critical Call Time    Critical Read Back    Ventilator Mode    Ventilator Rate      bpm    Tidal Volume      mL    Peep CHM2O      cm H2O    Apparatus HIGH FLOW CANNULA    Site of Arterial Puncture Brachial Left    Ang's Test Negative    GLUCOSE      74 - 99 mg/dL 96    GLUCOSE       111 (H)    SODIUM      136 - 145 mmol/L 142    SODIUM       141    POTASSIUM      3.5 - 5.3 mmol/L 3.3 (L)    POTASSIUM       3.3 (L)    CHLORIDE      98 - 107 mmol/L 95 (L)    CHLORIDE       95 (L)    Bicarbonate      21 - 32 mmol/L 34 (H)    Bicarbonate       36 (H)    Anion Gap      10 - 20 mmol/L 16    Anion Gap       13    Blood Urea Nitrogen      6 - 23 mg/dL 29 (H)    Blood Urea Nitrogen       28 (H)    Creatinine      0.50 - 1.30 mg/dL 1.42 (H)    Creatinine       1.40 (H)    EGFR      >60 mL/min/1.73m*2 49 (L)    EGFR       50 (L)    Calcium      8.6 - 10.3 mg/dL 9.2    Calcium       9.2    Albumin      3.4 - 5.0 g/dL 3.2 (L)    Albumin       3.2 (L)    Alkaline Phosphatase      33 - 136 U/L 91    Alkaline Phosphatase       80    Total Protein      6.4 - 8.2 g/dL 8.4 (H)    Total Protein       8.5 (H)    AST      9 - 39 U/L 14    AST       13    Bilirubin Total      0.0 - 1.2 mg/dL 1.7 (H)    Bilirubin Total       1.7 (H)    ALT      10 - 52 U/L 7 (L)    ALT       6 (L)    Color, Urine      Straw, Yellow  Flores ! (N)    Appearance, Urine      Clear  Hazy ! (N)    Specific Gravity, Urine      1.005 - 1.035  1.019    pH, Urine       5.0, 5.5, 6.0, 6.5, 7.0, 7.5, 8.0  5.0    Protein, Urine      NEGATIVE mg/dL 100 (2+) ! (N)    Glucose, Urine      NEGATIVE mg/dL NEGATIVE    Blood, Urine      NEGATIVE  SMALL (1+) !    Ketones, Urine      NEGATIVE mg/dL 5 (TRACE) !    Bilirubin, Urine      NEGATIVE  NEGATIVE    Urobilinogen, Urine      <2.0 mg/dL <2.0    Nitrite, Urine      NEGATIVE  NEGATIVE    Leukocyte Esterase, Urine      NEGATIVE  LARGE (3+) !    WBC, Urine      1-5, NONE /HPF >50 !    WBC Clumps, Urine      Reference range not established. /HPF MANY    RBC, Urine      NONE, 1-2, 3-5 /HPF NONE    Squamous Epithelial Cells, Urine      Reference range not established. /HPF 1-9 (SPARSE)    Bacteria, Urine      NONE SEEN /HPF 4+ !    Mucus, Urine      Reference range not established. /LPF 2+    Hemoglobin A1C      see below %    Estimated Average Glucose      Not Established mg/dL    Troponin I, High Sensitivity      0 - 20 ng/L 32 (H)    Troponin I, High Sensitivity       21 (H)    BNP      0 - 99 pg/mL 365 (H)    Lactate      0.4 - 2.0 mmol/L 1.6    Lactate       2.7 (H)    Extra Tube Hold for add-ons.    Procalcitonin      <=0.07 ng/mL 0.79 (H)    MAGNESIUM      1.60 - 2.40 mg/dL    PHOSPHORUS      2.5 - 4.9 mg/dL    Ammonia      16 - 53 umol/L    POCT Lactate, Venous      0.4 - 2.0 mmol/L    Thyroid Stimulating Hormone      0.44 - 3.98 mIU/L    POCT Glucose      74 - 99 mg/dL      Component      Latest Ref Rn 12/5/2023   WBC      4.4 - 11.3 x10*3/uL 29.3 (H)    nRBC      0.0 - 0.0 /100 WBCs 0.0    RBC      4.50 - 5.90 x10*6/uL 4.52    HEMOGLOBIN      13.5 - 17.5 g/dL 13.7    HEMATOCRIT      41.0 - 52.0 % 45.6    MCV      80 - 100 fL 101 (H)    MCH      26.0 - 34.0 pg 30.3    MCHC      32.0 - 36.0 g/dL 30.0 (L)    RED CELL DISTRIBUTION WIDTH      11.5 - 14.5 % 13.8    Platelets      150 - 450 x10*3/uL 283    Neutrophils %      40.0 - 80.0 % 89.9    Immature Granulocytes %, Automated      0.0 - 0.9 % 0.9    Lymphocytes %      13.0 - 44.0 % 3.2     Monocytes %      2.0 - 10.0 % 5.9    Eosinophils %      0.0 - 6.0 % 0.0    Basophils %      0.0 - 2.0 % 0.1    Neutrophils Absolute      1.60 - 5.50 x10*3/uL 26.32 (H)    Immature Granulocytes Absolute, Automated      0.00 - 0.50 x10*3/uL 0.27    Lymphocytes Absolute      0.80 - 3.00 x10*3/uL 0.95    Monocytes Absolute      0.05 - 0.80 x10*3/uL 1.73 (H)    Eosinophils Absolute      0.00 - 0.40 x10*3/uL 0.00    Basophils Absolute      0.00 - 0.10 x10*3/uL 0.04    POCT pH, Arterial      7.38 - 7.42 pH 7.53 (H)    POCT pH, Arterial       7.40    POCT pH, Arterial       7.12 (LL)    POCT pCO2, Arterial      38 - 42 mm Hg 38    POCT pCO2, Arterial       51 (H)    POCT pCO2, Arterial       118 (HH)    POCT pO2, Arterial      85 - 95 mm Hg 163 (H)    POCT pO2, Arterial       387 (H)    POCT pO2, Arterial       65 (L)    POCT SO2, Arterial      94 - 100 % 100    POCT SO2, Arterial       100    POCT SO2, Arterial       85 (L)    POCT Oxy Hemoglobin, Arterial      94.0 - 98.0 % 98.5 (H)    POCT Oxy Hemoglobin, Arterial       98.2 (H)    POCT Oxy Hemoglobin, Arterial       83.1 (L)    POCT Hematocrit Calculated, Arterial      41.0 - 52.0 % 35.0 (L)    POCT Hematocrit Calculated, Arterial       36.0 (L)    POCT Hematocrit Calculated, Arterial       41.0    POCT Sodium, Arterial      136 - 145 mmol/L 137    POCT Sodium, Arterial       135 (L)    POCT Sodium, Arterial       138    POCT Potassium, Arterial      3.5 - 5.3 mmol/L 3.5    POCT Potassium, Arterial       3.7    POCT Potassium, Arterial       4.6    POCT Chloride, Arterial      98 - 107 mmol/L 101    POCT Chloride, Arterial       100    POCT Chloride, Arterial       97 (L)    POCT Ionized Calcium, Arterial      1.10 - 1.33 mmol/L 1.07 (L)    POCT Ionized Calcium, Arterial       1.12    POCT Ionized Calcium, Arterial       1.20    POCT Glucose, Arterial      74 - 99 mg/dL 152 (H)    POCT Glucose, Arterial       144 (H)    POCT Glucose, Arterial       155 (H)    POCT  Lactate, Arterial      0.4 - 2.0 mmol/L 2.0    POCT Lactate, Arterial       2.4 (H)    POCT Lactate, Arterial       1.6    POCT Base Excess, Arterial      -2.0 - 3.0 mmol/L 8.5 (H)    POCT Base Excess, Arterial       5.6 (H)    POCT Base Excess, Arterial       4.8 (H)    POCT HCO3 Calculated, Arterial      22.0 - 26.0 mmol/L 31.8 (H)    POCT HCO3 Calculated, Arterial       31.6 (H)    POCT HCO3 Calculated, Arterial       38.4 (H)    POCT Hemoglobin, Arterial      13.5 - 17.5 g/dL 11.7 (L)    POCT Hemoglobin, Arterial       12.1 (L)    POCT Hemoglobin, Arterial       13.8    POCT Anion Gap, Arterial      10 - 25 mmo/L 8 (L)    POCT Anion Gap, Arterial       7 (L)    POCT Anion Gap, Arterial       7 (L)    Patient Temperature --    Patient Temperature --    Patient Temperature --    FiO2      % 50    FiO2       100    FiO2       100    Critical Called By MARIE NASCIMENTO    Critical Called To FELTON AHMADI    Critical Call Time 45.0000    Critical Read Back Y    Ventilator Mode A/C    Ventilator Rate      bpm 20    Tidal Volume      mL 450    Peep CHM2O      cm H2O 7.0    Apparatus    Site of Arterial Puncture    Ang's Test    GLUCOSE      74 - 99 mg/dL 141 (H)    SODIUM      136 - 145 mmol/L 140    POTASSIUM      3.5 - 5.3 mmol/L 4.5    CHLORIDE      98 - 107 mmol/L 97 (L)    Bicarbonate      21 - 32 mmol/L 35 (H)    Anion Gap      10 - 20 mmol/L 13    Blood Urea Nitrogen      6 - 23 mg/dL 34 (H)    Creatinine      0.50 - 1.30 mg/dL 1.72 (H)    EGFR      >60 mL/min/1.73m*2 39 (L)    Calcium      8.6 - 10.3 mg/dL 8.8    Albumin      3.4 - 5.0 g/dL 3.1 (L)    Alkaline Phosphatase      33 - 136 U/L 77    Total Protein      6.4 - 8.2 g/dL 8.0    AST      9 - 39 U/L 15    Bilirubin Total      0.0 - 1.2 mg/dL 0.8    ALT      10 - 52 U/L 6 (L)    Color, Urine      Straw, Yellow     Appearance, Urine      Clear     Specific Gravity, Urine      1.005 - 1.035     pH, Urine      5.0, 5.5, 6.0, 6.5, 7.0, 7.5, 8.0     Protein, Urine       NEGATIVE mg/dL    Glucose, Urine      NEGATIVE mg/dL    Blood, Urine      NEGATIVE     Ketones, Urine      NEGATIVE mg/dL    Bilirubin, Urine      NEGATIVE     Urobilinogen, Urine      <2.0 mg/dL    Nitrite, Urine      NEGATIVE     Leukocyte Esterase, Urine      NEGATIVE     WBC, Urine      1-5, NONE /HPF    WBC Clumps, Urine      Reference range not established. /HPF    RBC, Urine      NONE, 1-2, 3-5 /HPF    Squamous Epithelial Cells, Urine      Reference range not established. /HPF    Bacteria, Urine      NONE SEEN /HPF    Mucus, Urine      Reference range not established. /LPF    Hemoglobin A1C      see below % 6.0 (H)    Estimated Average Glucose      Not Established mg/dL 126    Troponin I, High Sensitivity      0 - 20 ng/L 27 (H)    BNP      0 - 99 pg/mL    Lactate      0.4 - 2.0 mmol/L 1.6    Lactate       2.1 (H)    Extra Tube Hold for add-ons. (P)   Procalcitonin      <=0.07 ng/mL 0.72 (H)    MAGNESIUM      1.60 - 2.40 mg/dL 2.10    PHOSPHORUS      2.5 - 4.9 mg/dL 8.3 (H)    Ammonia      16 - 53 umol/L 78 (H)    POCT Lactate, Venous      0.4 - 2.0 mmol/L 2.1 (H)    Thyroid Stimulating Hormone      0.44 - 3.98 mIU/L 1.59    POCT Glucose      74 - 99 mg/dL 156 (H)       Legend:  (H) High  (L) Low  ! (N) Normal  ! Abnormal  (LL) Low Panic  (HH) High Panic  (P) Preliminary     Sputum positive for GPC    CT CAP -   Narrative & Impression   Interpreted By:  Schoenberger, Joseph,   STUDY:  CT CHEST ABDOMEN PELVIS WO CONTRAST;  12/5/2023 9:25 am      INDICATION:  Signs/Symptoms:infectious process/sepsis.      COMPARISON:  Chest CT performed 07/01/2021. Abdomen and pelvis CT 08/26/2020.      ACCESSION NUMBER(S):  [id number]      ORDERING CLINICIAN:  SILVIO MAI      TECHNIQUE:  CT of the chest, abdomen and pelvis was performed. Contiguous axial  images were obtained at 3 mm slice thickness through the chest,  abdomen and pelvis. Coronal and sagittal reconstructions at 3 mm  slice thickness were performed.  No  intravenous contrast was  administered; positive oral contrast was given.      FINDINGS:  Please note that the study is limited without intravenous contrast.      CHEST:  Patient is intubated with the tube tip in apparent satisfactory  position below the thoracic inlet 4 cm above gayla.      LUNG/PLEURA/LARGE AIRWAYS:  The large airways appear intact. There are extensive findings of  centrilobular emphysema. There is a prominent area of pulmonary  consolidation involving the inferior aspect of the lingula and  lateral and posteromedial basal segments of the left lower lobe. Air  bronchograms are noted. Additionally, there are multifocal  tree-in-bud and nodular in passes in both lungs. These findings are  suspicious for multifocal pneumonia. Similar areas of more  wedge-shaped consolidation are noted in both lungs 1 in the posterior  left lower lobe and another in the posterior right lower lobe which  could be infectious. Additionally, depending consolidative opacity in  the posterior right lower lobe with air bronchograms suggestive of  pneumonia. There is thickening of the bronchial walls which is also  apparent on prior CT.      In the posterior basal segment of the right lower lobe relatively  centrally, there is in opacity with spiculated margins and convex  contours which measures 14 x 9 mm. It contains no air bronchograms  and has the appearance of a possible mass which may have developed in  the interval since the exam in 2021. Because of this, it is  recommended that post therapy chest CT be performed in 1-2 months to  exclude an underlying mass as it is possible this is part of the  underlying infectious process. Reference series 205, image 181 of  365. There is no pleural effusion or pneumothorax.      VESSELS:  Aorta and pulmonary arteries are normal caliber.  There are moderate  calcifications due to atherosclerosis of the thoracic aorta. There  are moderate coronary atherosclerotic calcifications.       HEART:  Normal size.  No pericardial effusion peer      MEDIASTINUM AND MATT:  There is an enlarged soft tissue lesion to the left of the main  pulmonary artery and anterior to apparently separate from the left  atrial appendage. It measures 10 x 13 mm. Is apparent on the prior  chest CT and is slightly increased in size from comparable prior  measurements of 12 x 9 mm. A 2nd soft tissue lesion just superior to  this measures 8 mm and has also increased from 4 mm previously.  Subcarinal lymph node unchanged in size. No other enlarged nodes are  noted. In light of the infectious process in the lungs these could  merely represent reactive lymph nodes. Nasogastric tube in esophagus.      CHEST WALL AND LOWER NECK:  There is no axillary or supraclavicular adenopathy. There is abnormal  soft tissue gas without soft tissue swelling or fluid collection in  the neck extending from the right side of the neck into the  prevertebral tissues as well as in the right supraclavicular tissues  to the right axilla. The superior extent of this is not in the imaged  field of the chest CT. Etiology of this is uncertain on the basis of  the submitted images. The thyroid appears unremarkable      ABDOMEN:      LIVER:  No focal lesion within limits of unenhanced exam      BILE DUCTS:  No dilation      GALLBLADDER:  No wall thickening or pericholecystic fluid. Prominent dependent  layering gallstone unchanged from prior.      PANCREAS:  The pancreas appears unremarkable.      SPLEEN:  Within normal limits.      ADRENAL GLANDS:  Within normal limits.      KIDNEYS AND URETERS:  No definite renal cortical lesions are seen. There is a rather large  staghorn stone in the renal pelvis of the left kidney greatest  measurement 2.4 cm unchanged from prior. This does result in mild  hydronephrosis again unchanged from prior. Several other smaller  stones are noted more peripherally in the left kidney again  unchanged. No right renal stones are  seen. No hydroureter or ureter  stone is noted though exam is limited by the presence of bilateral  hip arthroplasties obscuring the lower pelvis.      PELVIS:      BLADDER:  Appears to be decompressed by Peralta catheter      REPRODUCTIVE ORGANS:  Obscured by artifact from hip prosthetics      BOWEL:  Is nasogastric tube points laterally in the stomach. It appears to  tent the lateral wall of the stomach laterally though no extraluminal  gas is noted. It could be retracted 2-3 cm. Small bowel loops normal  in caliber without mural thickening. Visible segments of colon normal  in caliber without evidence for mural thickening. Lower pelvis is  obscured by artifact from bilateral hip prosthetics. The appendix is  not seen      VESSELS:  The aorta and IVC are within normal limits.      PERITONEUM/RETROPERITONEUM/LYMPH NODES:  There is no free or loculated fluid collection, no free  intraperitoneal air.  The retroperitoneum appears unremarkable.  No  abdominopelvic lymphadenopathy is present.      ABDOMINAL WALL:  Intact      BONES:  No suspicious osseous lesions are identified.      IMPRESSION:  Chest  1.  Extensive bilateral pneumonia superimposed upon rather severe  emphysema/ D.  2. Possible spiculated mass in the right lower lobe which will  warrant post treatment follow-up CT in 1-2 months.  3. There is significant soft tissue gas at the base of the neck  predominantly to the right extending in the right supraclavicular  region and axilla but not into the mediastinum.  4. Slightly increased size of mediastinal nodes as detailed above.  Possibly reactive due to pneumonia.      Abdomen-Pelvis  1.  Multiple left renal calculus to include staghorn stone in the  left kidney which results in mild hydronephrosis though the degree of  this is unchanged from the prior CT.  2. Cholelithiasis without evidence for acute cholecystitis.       CT neck - IMPRESSION:  Subcutaneous emphysema extends from the right of the  supraclavicular  region and axilla anteriorly and medially to the retropharyngeal or  danger space.      I personally reviewed the images/study and I agree with the findings  as stated by Nilay Bethea MD. This study was interpreted at  [facility], [place], [place].      Assessment/Plan     84 y/o male with severe sepsis of unclear etiology.  Patient has GPC on sputum and evidence of pneumonia on CT, however it appears that his illness is out of proportion of his resp. Findings on scan.  To my examination, the subcutaneous emphysema appears to be connected to the thoracic cavity, possibly due to a ruptured bleb, however it is concievable that this gas is due to some necrotizing process.  On physical exam, the external portions of exam were not as concerning as I would expect for a necrotizing infection.  Further, if this is a necrotizing infection, it appears the majority is in the head and neck region which would be more appropriate for evaluation and treatment with ENT (ie. Danger space).    -No general surgical intervention at this time  -Recommend ENT evaluation for possible necrotizing head and neck infection  -Will continue to follow for now, please call back sooner if patient deteriorates with concern that this is secondary to a necrotizing infection, however in that situation, I would need to intervene with ENT  -Cont. Excellent care in ICU    Ilan Cameron MD  12/05/23  8:18 PM      I spent 53 minutes in the professional and overall care of this patient.      Ilan Cameron MD  8:18 PM  12/05/23

## 2023-12-06 NOTE — CONSULTS
"Nutrition Initial Assessment:   Nutrition Assessment         Patient is a 83 y.o. male presenting with Acute hypoxia respiratory failure. PMH of colon cancer status post partial colectomy with end colostomy, acute pancreatitis, difficulty swallowing, HTN, PVD, COPD, HLD, pneumonia. Pt was transferred to [facility] from [place] ED with AHRF requiring intubation.       Nutrition History:  Food and Nutrient History: Met briefly with Pt at bedside for Nutrition assessment. Pt is intubated and sedated, RD was not able to obtain subjective information at this time. Pt on levophed drip. Visible muscle and fat wasting noted. RN report Pt will be extubated today.  Per EMR review Pt has been eating less and was consuming Boost TID and had a regular diet PTA in SNF.   Energy Intake:  (Pt currently NPO)    Anthropometrics:  Height: 188 cm (6' 2.02\")   Weight: 57.9 kg (127 lb 10.3 oz)   BMI (Calculated): 16.38  IBW/kg (Dietitian Calculated): 86.36 kg  Percent of IBW: 67 %       Weight History:   Wt Readings from Last 10 Encounters:   12/05/23 57.9 kg (127 lb 10.3 oz)   12/05/23 55.4 kg (122 lb 2.2 oz)   01/18/21 64.4 kg (142 lb)   12/08/20 68.1 kg (150 lb 2.1 oz)   10/02/20 67.6 kg (149 lb)      Weight Change %:  Weight History / % Weight Change: Noting limited weight history per EMR, unable to assess weight changes           Nutrition Focused Physical Exam Findings:    Subcutaneous Fat Loss:   Orbital Fat Pads: Mild-Moderate (slight dark circles and slight hollowing)  Buccal Fat Pads: Mild-Moderate (flat cheeks, minimal bounce)  Muscle Wasting:  Temporalis: Mild-Moderate (slight depression)  Pectoralis (Clavicular Region): Mild-Moderate (some protrusion of clavicle)  Edema:  Edema: none  Edema Location: No edema noted 12/5  Physical Findings:  Skin:  (Noting Pressure injury in right Butocks and Sacrum)    Nutrition Significant Labs:  BMP Trend: Reviewed  Results from last 7 days   Lab Units 12/06/23  0654 12/05/23  7246 " 12/05/23  0104 12/04/23  1332   GLUCOSE mg/dL 165* 175* 141* 96   CALCIUM mg/dL 8.4* 8.3* 8.8 9.2   SODIUM mmol/L 141 140 140 142   POTASSIUM mmol/L 3.5 2.9* 4.5 3.3*   CO2 mmol/L 28 28 35* 34*   CHLORIDE mmol/L 102 99 97* 95*   BUN mg/dL 37* 37* 34* 29*   CREATININE mg/dL 1.41* 1.43* 1.72* 1.42*    , Renal Lab Trend: Reviewed  Results from last 7 days   Lab Units 12/06/23  0654 12/05/23  2356 12/05/23  0104 12/04/23  1332 12/04/23  1332   POTASSIUM mmol/L 3.5 2.9* 4.5  --  3.3*   PHOSPHORUS mg/dL 3.5  --  8.3*   < >  --    SODIUM mmol/L 141 140 140  --  142   MAGNESIUM mg/dL 2.32 1.76 2.10   < >  --    EGFR mL/min/1.73m*2 49* 48* 39*  --  49*   BUN mg/dL 37* 37* 34*  --  29*   CREATININE mg/dL 1.41* 1.43* 1.72*  --  1.42*    < > = values in this interval not displayed.        Nutrition Specific Medications: All med's reviewed noting- clindamycin, pantoprazole, enoxaparin, fentanyl, norepinephrine, polyethylene glycol       I/O:   Last BM Date: 12/06/23; Stool Appearance: Soft (12/06/23 0800)        Dietary Orders (From admission, onward)       Start     Ordered    12/05/23 2340  NPO Diet; Effective now  Diet effective now         12/05/23 2340                     Estimated Needs:   Total Energy Estimated Needs (kCal): 1414 kCal  Method for Estimating Needs: PSU  Total Protein Estimated Needs (g): 87 g (87- 116 gm/day)  Method for Estimating Needs: 1.5-2 gm/kg of ABW  Total Fluid Estimated Needs (mL): 1414 mL  Method for Estimating Needs: 1mL/kcal or per team        Nutrition Diagnosis   Nutrition Diagnosis:  Malnutrition Diagnosis  Patient has Malnutrition Diagnosis:  yes  Malnutrition Diagnosis: Moderate malnutrition related to chronic disease or condition  As evidence by: mild to moderato muscle mass and fat loss, BMI <16, Pt is 67% of IBW   Additional Assessment Information: Per EMR review Pt with history of protein calorie malnutrition, suspect moderate to severe malnutrition due to BMI <16, indicating  underweight status, muscle mass and adipose tissue loss, failure to thrive, Pt using ONS PTA.     Nutrition Diagnosis  Patient has Nutrition Diagnosis: Yes  Diagnosis Status (1): New  Nutrition Diagnosis 1: Increased nutrient needs  Related to (1): Increased metabolic needs  As Evidenced by (1): Acute hypoxemic respiratory failure on vent       Nutrition Interventions/Recommendations   Nutrition Interventions and Recommendations:        Nutrition Prescription:  When appropriate start Isosource 1.5 @10 mL/ hr, monitor for tolerance. Advance by 10 mL/ Q 6-8 hr until goal rate @40 mL/hr  FWF per team   Provide additional 1 packet prostat to aid in Protein intake  TF + prostat provides--> 1540 kcals, 82 gm PRO, 733 mL water      Nutrition Monitoring and Evaluation   Monitoring/Evaluation:   Food/Nutrient Related History Monitoring  Monitoring and Evaluation Plan: Enteral and parenteral nutrition intake  Enteral and Parenteral Nutrition Intake: Enteral nutrition formula/solution  Criteria: Tf meeting >75% of needs    Body Composition/Growth/Weight History  Monitoring and Evaluation Plan: Weight  Weight: Measured weight  Criteria: No unplanned significant weight changes    Biochemical Data, Medical Tests and Procedures  Monitoring and Evaluation Plan: Electrolyte/renal panel  Electrolyte and Renal Panel: Magnesium, Phosphorus, Potassium  Criteria: WNL      Time Spent/Follow-up Reminder:   Follow Up  Time Spent (min): 45 minutes  Last Date of Nutrition Visit: 12/06/23  Nutrition Follow-Up Needed?: Dietitian to reassess per policy

## 2023-12-06 NOTE — PROGRESS NOTES
"Daniel Robb is a 83 y.o. male on Hospital Day: 2 of admission presenting with Acute hypoxemic respiratory failure (CMS/HCC).    SUBJECTIVE    Overnight Events: Sedated earlier this morning. Received his CT neck soft tissue / chest scan and results were reviewed with thoracic surg / ENT team. No interventions at this time. Pt was responsive to commands and appeared comfortable. He says he has no pain when asked in the early afternoon.     OBJECTIVE  Vitals  Visit Vitals  /55   Pulse 54   Temp 36.3 °C (97.3 °F) (Temporal)   Resp 14   Ht 1.88 m (6' 2.02\")   Wt 57.9 kg (127 lb 10.3 oz)   SpO2 100%   BMI 16.38 kg/m²   Smoking Status Former   BSA 1.74 m²       Physical Exam   Physical Exam  Vitals reviewed.   Constitutional:       Appearance: He is ill-appearing.      Comments: Lethargic, but responsive.    HENT:      Head: Normocephalic and atraumatic.      Mouth/Throat:      Mouth: Mucous membranes are moist.      Comments: intubated  Eyes:      Extraocular Movements: Extraocular movements intact.      Pupils: Pupils are equal, round, and reactive to light.   Cardiovascular:      Rate and Rhythm: Normal rate and regular rhythm.      Pulses: Normal pulses.      Heart sounds: Normal heart sounds. No murmur heard.     No gallop.   Pulmonary:      Effort: Pulmonary effort is normal.      Breath sounds: Normal breath sounds.   Abdominal:      General: Abdomen is flat. The ostomy site is clean. There is no distension.      Palpations: Abdomen is soft.      Tenderness: There is no abdominal tenderness.      Comments: Lower left ostomy bag in place with good output   Musculoskeletal:      Right lower leg: No edema.      Left lower leg: No edema.   Skin:     General: Skin is warm and dry.   Neurological:      Comments: Responsive to commands, able to squeeze hands           IOs    Intake/Output Summary (Last 24 hours) at 12/6/2023 1438  Last data filed at 12/6/2023 1423  Gross per 24 hour   Intake 1148.42 ml   Output " "380 ml   Net 768.42 ml       Vent settings:   Vent Mode: Volume control/assist control  FiO2 (%):  [40 %-50 %] 40 %  S RR:  [14-20] 14  S VT:  [450 mL] 450 mL  PEEP/CPAP (cm H2O):  [5 cm H20-7 cm H20] 5 cm H20  MAP (cm H2O):  [9-17] 9     Labs:   Results from last 72 hours   Lab Units 12/06/23  0654 12/05/23 2356 12/05/23  0104   SODIUM mmol/L 141 140 140   POTASSIUM mmol/L 3.5 2.9* 4.5   CHLORIDE mmol/L 102 99 97*   CO2 mmol/L 28 28 35*   BUN mg/dL 37* 37* 34*   CREATININE mg/dL 1.41* 1.43* 1.72*   GLUCOSE mg/dL 165* 175* 141*   CALCIUM mg/dL 8.4* 8.3* 8.8   ANION GAP mmol/L 15 16 13   EGFR mL/min/1.73m*2 49* 48* 39*   PHOSPHORUS mg/dL 3.5  --  8.3*      Results from last 72 hours   Lab Units 12/06/23  0654 12/05/23 2356 12/05/23  0105   WBC AUTO x10*3/uL 21.1* 24.7* 29.3*   HEMOGLOBIN g/dL 10.3* 11.1* 13.7   HEMATOCRIT % 32.3* 32.3* 45.6   PLATELETS AUTO x10*3/uL 242 246 283   NEUTROS PCT AUTO % 91.8 94.1 89.9   LYMPHS PCT AUTO % 3.5 1.7 3.2   MONOS PCT AUTO % 3.7 3.4 5.9   EOS PCT AUTO % 0.0 0.0 0.0      Lab Results   Component Value Date    CALCIUM 8.4 (L) 12/06/2023    PHOS 3.5 12/06/2023      Lab Results   Component Value Date    CRP 7.55 (A) 08/29/2020      [unfilled]     Micro/ID:   No results found for the last 90 days.                   No lab exists for component: \"AGALPCRNB\"   .ID  Lab Results   Component Value Date    URINECULTURE (A) 12/04/2023     Multiple organisms present, probable contamination. Repeat culture if clinically indicated.    BLOODCULT No growth at 1 day 12/04/2023       Images  XR abdomen 1 view, XR chest 1 view  Narrative: Interpreted By:  Angela Hong  and Alvina Kwan   STUDY:  XR CHEST 1 VIEW; XR ABDOMEN 1 VIEW;  12/6/2023 12:37 am      INDICATION:  Signs/Symptoms:resp failure, COPD, intubated; Signs/Symptoms:NG  placement.      COMPARISON:  Chest x-ray dated 12/05/2023 and CT of the chest, abdomen, and pelvis  dated 12/05/2023.      ACCESSION NUMBER(S):  [id number]; " [id number]      ORDERING CLINICIAN:  DEE FLORES      FINDINGS:  CHEST X-RAY:      Single AP radiograph of the chest. Single AP radiograph of the  abdomen.      Endotracheal tube with tip located 5.6 cm above the gayla. Left IJ  approach central venous catheter with tip projecting over the  expected position of the superior cavoatrial junction. Enteric tube  with tip projecting over the expected position of the proximal  gastric body and side port projecting in close proximity to the  expected position of the gastroesophageal junction; consider  advancement.      CARDIOMEDIASTINAL SILHOUETTE:  Cardiomediastinal silhouette is normal in size and configuration.      LUNGS:  Redemonstration consolidative bibasilar opacities,  left-greater-than-right and similar compared to prior chest x-ray.  There is mild blunting of left costophrenic angle. No pneumothorax.  Redemonstration of coarse interstitial markings throughout the lungs  bilaterally. Hyperinflated lungs noted.      BONES:  No acute osseous changes.      ABDOMINAL X-RAY:      Nonobstructive bowel gas pattern. No evidence of pneumoperitoneum,  limited for evaluation on this semi-erect single radiograph of the  abdomen.      Multiple radiopaque circumscribed masses within the left upper  quadrant corresponding with nephrolithiasis on prior CT.      Impression: 1.  Redemonstration of bibasilar consolidative opacities concerning  for multifocal pneumonia.  2. Small left pleural effusion.  3. Nonobstructive bowel gas pattern.  4. Findings of large left kidney renal pelvis nephrolith better  evaluated on the previous CT  5. Medical devices as above.      I personally reviewed the images/study with Aquiles Tinsley MD (Radiology  Resident) and I agree with the findings as stated. This study was  interpreted at [facility],  [place], [place].      MACRO:  None      Signed by: Angela oHng 12/6/2023 12:40 PM  Dictation workstation:    [id number]  CT soft tissue neck wo IV contrast  Narrative: Interpreted By:  Debbie Fontenot and Benza Andrew   STUDY:  CT SOFT TISSUE NECK WO IV CONTRAST;  12/6/2023 6:27 am      INDICATION:  Signs/Symptoms:necrotizing process of the deep neck space, septic  shock.      COMPARISON:  CT neck dated 12/05/2023      ACCESSION NUMBER(S):  [id number]      ORDERING CLINICIAN:  DEE FLORES      TECHNIQUE:  Axial CT images of the neck were obtained without contrast. The  images were reformatted in angled axial, coronal and sagittal planes.      FINDINGS:  Oral Cavity, Pharynx and Larynx:  The patient is edentulous. Within  the limitations of this unenhanced examination, the nasopharyngeal  and oropharyngeal soft tissues are grossly unremarkable. The  hypopharyngeal and laryngeal structures are unremarkable.      Retropharyngeal and Prevertebral Soft Tissues: Similar appearance  compared to prior of soft tissue gas at the base of the neck from the  right supraclavicular region and axilla extending anteriorly and  medially into the retropharyngeal space. No gross retropharyngeal  fluid collection is evident.      Lymph nodes: Evaluation for cervical lymphadenopathy is suboptimal on  the basis of this unenhanced examination. There are scattered,  nonspecific cervical lymph nodes bilaterally.      Neck vessels: The cervical vessels can not be adequately assessed on  this unenhanced examination. There are atherosclerotic changes of the  carotid bifurcations and proximal ICAs.      Thyroid gland: The thyroid gland is somewhat degraded by artifact and  is mildly heterogeneous.      Parotid and submandibular glands: Bilateral parotid and submandibular  glands are unremarkable in appearance.      Paranasal Sinuses and Mastoids: There is mucosal thickening within  scattered paranasal sinuses and opacification of inferior left-sided  mastoid air cells. There is nonspecific opacity in the left external  auditory canal  which could represent cerumen or debris. Correlation  with direct visualization is recommended.      There are multilevel degenerative changes of the cervical spine with  associated central canal and neuroforaminal stenosis.      Bilateral native lens extractions. Otherwise, visualized orbital  structures are unremarkable.      There is extensive apical pleural thickening and nodularity on the  right and to a lesser extent on the left. There are emphysematous and  fibrotic changes of the visualized upper lungs bilaterally. There are  scattered nodular foci bilaterally as well.      Endotracheal tube terminates above the gayla. Enteric tube is  partially visualized within the esophagus. There are secretions  within the esophagus.      Impression: Redemonstration of multifocal soft tissue emphysema within the deep  spaces of the neck, primarily involving the retropharyngeal, right  carotid, and right parapharyngeal spaces and inferiorly extending  into the right axilla. The etiology remains uncertain.      Right greater than left apical pleural thickening and nodularity with  extensive emphysematous and fibrotic changes. There are also several  small indeterminate nodular foci. Please correlate with prior chest  CT from December 5, 2023.      I personally reviewed the images/study and I agree with the findings  as stated above by resident physician, Ravindra Darnell MD.          MACRO:  None.      Signed by: Debbie Fontenot 12/6/2023 7:42 AM  Dictation workstation:   [id number]  CT chest wo IV contrast  Narrative: STUDY:  CT CHEST WO IV CONTRAST;  12/6/2023 6:27 am      INDICATION:  Signs/Symptoms:pneumomediastinum.      COMPARISON:  CT chest abdomen pelvis dated 12/05/2023      ACCESSION NUMBER(S):  [id number]      ORDERING CLINICIAN:  DEE FLORES      TECHNIQUE:  Helical data acquisition of the chest was obtained  without IV  contrast material.  Images were reformatted in axial, coronal, and  sagittal  planes.      FINDINGS:  LUNGS AND AIRWAYS:  The trachea and central airways are patent. No endobronchial lesion.  Endotracheal tube again terminates 4 cm above the gayla.      Again seen is extensive centrilobular emphysema. There is similar  appearance of prominent consolidation involving the inferior lingula  and lateral and posteromedial basal segments of the left lower lobe.  Multifocal tree-in-bud and nodular opacities are again seen in the  bilateral lungs. Similar appearance of additional consolidative  opacities in the bilateral posterior lower lobes. Bronchial wall  thickening is again noted. No pleural effusion or pneumothorax.      MEDIASTINUM AND MATT, LOWER NECK AND AXILLA:  The visualized thyroid gland is within normal limits.      Prominent mediastinal lymph nodes are unchanged measuring up to 1 cm  in short axis diameter.      Esophagus appears within normal limits as seen.      HEART AND VESSELS:  The thoracic aorta is of normal course and caliber with severe  vascular calcifications.      Main pulmonary artery and its branches are normal in caliber.      Severe coronary artery calcifications are seen. The study is not  optimized for evaluation of coronary arteries.      The cardiac chambers are not enlarged.      No evidence of pericardial effusion.      UPPER ABDOMEN:  A calcified stone is noted in the gallbladder lumen. Partial  visualization of left renal staghorn calculus. Otherwise the  visualized subdiaphragmatic structures demonstrate no remarkable  findings. Enteric tube terminates in the gastric body.      CHEST WALL AND OSSEOUS STRUCTURES:  There is similar appearance of soft tissue gas in predominantly the  right neck extending to the prevertebral soft tissues. There are no  suspicious osseous lesions. Multilevel degenerative changes are  present      Impression: 1.  Similar appearance of the chest compared to prior examination  with extensive bilateral pneumonia superimposed on severe  emphysema.  2. Visualized nodular opacities are likely infectious. However,  recommend post treatment follow-up CT in 1-2 months to rule out mass.  3. Similar appearance of soft tissue gas predominantly in the right  neck.  4. Mediastinal lymphadenopathy is likely reactive.      I personally reviewed the images/study and I agree with the findings  as stated above by resident physician, Ravindra Darnell MD. This study  was interpreted at [facility],  [place], [place].      MACRO:  None          Dictation workstation:   [id number]      Meds  Scheduled medications  clindamycin, 900 mg, intravenous, q8h  heparin (porcine), 5,000 Units, subcutaneous, q8h  hydrocortisone sod succ (PF) (Solu-CORTEF) 50 mg in sodium chloride 0.9% 50 mL IVPB, 50 mg, intravenous, q6h  pantoprazole, 40 mg, intravenous, Daily before breakfast  piperacillin-tazobactam, 3.375 g, intravenous, q6h      Continuous medications  dexmedeTOMIDine, 0.1-1.5 mcg/kg/hr, Last Rate: Stopped (12/06/23 0900)  fentaNYL,  mcg/hr, Last Rate: Stopped (12/06/23 1310)  norepinephrine, 0.01-1 mcg/kg/min, Last Rate: 0.04 mcg/kg/min (12/06/23 1310)      PRN medications  PRN medications: albuterol, oxygen, polyethylene glycol     ASSESSMENT/PLAN  83 y.o. male with HTN, possible COPD (2L baseline), colon cancer s/p partial colectomy with end colostomy, and PVD who presents as a transfer from [place] ED with AHRF requiring intubation and septic shock on pressors. Transfer initiated for CT findings concerning for necrotizing process of the upper chest/neck requiring urgent ENT/thoracic surgery evaluation.      CNS  #Sedation  -off precedex / fentanyl drips since 12/6 afternoon     PULM  #Acute on chronic mixed resp failure  #Pneumonia causing septic shock  #COPD on 2L baseline  -Vent: 16/430/40%/5  -discontinue solumedrol, no wheezing on exam  -continue abx as below  -tried CPAP but only pulled 150 tidal, will reattempt later   [ ] will  extubate if stable/pulling larger tidal volumes     #RLL spiculated mass, 14 x 9 mm   -possibly cancerous, could explain weight loss  -F/u CT in 1-2 months     CV  #Septic shock  -on levo this AM - wean as tolerating  -formal TTE 12/5: EF 60%, IVC dilated  -stress dose hydrocort continued from OSH, wean as able    #Hx of hypertension  -holding home metoprolol / HCTZ     ENT/Thoracic  #Subcutaneous emphysema near/in the danger space  #Mediastinal LAD  -CT chest read: significant soft tissue gas at the base of the neck predominantly to the right extending in the right supraclavicular region and axilla but not into the mediastinum  -CT neck: Subcutaneous emphysema extends from the right of the supraclavicular region and axilla anteriorly and medially to the retropharyngeal or danger space  -repeat CT neck / chest - no significant changes  -ENT / thoracic surg - no interventions at this time     ID  #Septic shock  -source(s): PNA, UTI, possible necrotizing neck/chest process  -UCX mixed probable contaminant   -MRSA nares PCR neg, Urine strep and legionella neg, procal 0.8  -WBC downtrending   -cont vanc/zosyn/clinda while cultures return     FEN/GI  -NPO while intubated and on pressors  [ ] can restart feeding soon, close to extubation     #Protein calorie malnutrition  -ddx: lung cancer, other malignancy (elevated protein gap), and/or end stage COPD  -nutrition consult  -OG in place     RENAL/  #CHARY  -bl 1.0, peak 1.7, 1.4 [place] [facility]  -suspect septic shock  -fowler cath in place      #Urinary tract infection  #Staghorn calculus on L, mild hydronephrosis  -Repeat UCx pending      HEME/ONC  #Acute anemia  -baseline ~13, 10.3 this AM  -low likelihood for hemolysis: LDH barely elevated, no abnormalities reported on smear/diff  -not DIC, aptt wnl  -elevated INR may be nutritional deficiency, no hx of liver disease  -most likely due to septic shock     #Elevated gamma gap  -SPEP, UPEP     MSK/Skin  #Pressure wounds    -wound care consult     FEN: NPO  A: art line, LIJ CVC  O2: vent - 40%  Drips: levo  Abx: Rocephin/Azithro (12/4), Cefepime, Unasyn, Levaquin, Flagyl (12/5), vanc, zosyn, clinda (12/5-current)  DVT PPx: SQH  GI PPx: Protonix 40mg     CODE STATUS: FULL (confirmed on SNF paperwork)  SURROGATE DECISION MAKER: [place] ([place]) - [phone]    Dispo - 83 y.o. male admitted for AHRF and septic shock. Will attempt to extubate later this afternoon or tomorrow with SBT. No surgical interventions at this time.      Edis Page, DO  Internal Medicine, PGY-I

## 2023-12-07 NOTE — PROGRESS NOTES
"Vancomycin Dosing by Pharmacy- FOLLOW UP    Daniel Robb is a 83 y.o. year old male who Pharmacy has been consulted for vancomycin dosing for cellulitis, skin and soft tissue. Based on the patient's indication and renal status this patient is being dosed based on a goal AUC of 400-600.     Renal function is currently stable.    Current vancomycin dose: 750 mg x 1 dose by level.    Most recent random level: 11.5 mcg/mL. Will switch to AUC dosing due to renal stability.    Visit Vitals  /55   Pulse 52   Temp 36.1 °C (97 °F) (Temporal)   Resp 16        Lab Results   Component Value Date    CREATININE 1.40 (H) 12/07/2023    CREATININE 1.41 (H) 12/06/2023    CREATININE 1.43 (H) 12/05/2023    CREATININE 1.72 (H) 12/05/2023        Patient weight is No results found for: \"PTWEIGHT\"    No results found for: \"CULTURE\"     I/O last 3 completed shifts:  In: 2547.8 (41.5 mL/kg) [I.V.:287.8 (4.7 mL/kg); NG/GT:60; IV Piggyback:2200]  Out: 765 (12.5 mL/kg) [Urine:715 (0.3 mL/kg/hr); Stool:50]  Weight: 61.4 kg   [unfilled]    Lab Results   Component Value Date    PATIENTTEMP 37.0 12/06/2023    PATIENTTEMP 37.0 12/06/2023    PATIENTTEMP  12/05/2023      Comment:      NOTE: Patient Results are Not Corrected for Temperature        Assessment/Plan    Within goal AUC. Will switch to scheduled dosing of 750 mg Q24hr.    This dosing regimen is predicted by [employer] to result in the following pharmacokinetic parameters:  Loading dose: N/A  Regimen: 750 mg IV every 24 hours.  Start time: 08:43 on 12/07/2023  Exposure target: AUC24 (range)400-600 mg/L.hr   AUC24,ss: 465 mg/L.hr  Probability of AUC24 > 400: 79 %  Ctrough,ss: 14.6 mg/L  Probability of Ctrough,ss > 20: 10 %  Probability of nephrotoxicity (Lodise TERENCE 2009): 10 %      The next level will be obtained on 12/10 at morning draw. May be obtained sooner if clinically indicated.   Will continue to monitor renal function daily while on vancomycin and order serum creatinine " at least every 48 hours if not already ordered.  Follow for continued vancomycin needs, clinical response, and signs/symptoms of toxicity.       Cally Rodgers, PharmD

## 2023-12-07 NOTE — SIGNIFICANT EVENT
ENT Progress Note    Subjective:  Patient extubated. Doing well on 4L NC. No stridor    Objective:  Vitals reviewed  General: No acute distress  Resp: Breathing comfortably on 4L NC, no stridor  Head: Atraumatic, normocephalic  Oral Cavity: mucous membranes dry  Ears: Normal external ears  Nose: Normal external nose    Assessment  Daniel Robb is a 83 y.o. male who is presenting as a transfer from [place] for acute hypoxic respiratory failure in the setting of sepsis on levo and found to have subcutaneous air in the retropharynx and deep neck space.  ENT consulted for management and rule out of necrotizing fasciitis.  Exam without palpable crepitus or necrosis noted.  No fluctuance.     Recommendations  - No further ENT interventions needed    Norma Ariza MD  PGY-3  Otolaryngology - Head and Neck Surgery  Personal: [id number] (Epic Chat preferred), Adult ENT Consults: [id number], Peds ENT Team: [id number]

## 2023-12-07 NOTE — PROGRESS NOTES
SOCIAL WORK NOTE   Patient not able to participate in assessment. SW spoke with son for additional information (please see flowsheets for further details). Patient normally lives in [facility], and has since 2019. Prior to admission care staff noted concerns about patient not cooperating with care and possibly needing to go to different level of care. PT/OT pending at time of phone call, but son would be agreeable to go to SNF with [facility]. Currently denied needs for social work. Social work to follow.   Samantha Alexander, EDE, LISW-S ([id number])

## 2023-12-07 NOTE — PROGRESS NOTES
"Daniel Robb is a 83 y.o. male on Hospital Day: 3 of admission presenting with Acute hypoxemic respiratory failure (CMS/HCC).    SUBJECTIVE    Overnight Events: Extubated this morning after passing SBT. Weaned off pressors this AM. Appears well otherwise and comfortable.     OBJECTIVE  Vitals  Visit Vitals  /55   Pulse 73   Temp 36.4 °C (97.5 °F) (Temporal)   Resp 14   Ht 1.88 m (6' 2.02\")   Wt 61.4 kg (135 lb 5.8 oz)   SpO2 98%   BMI 17.37 kg/m²   Smoking Status Former   BSA 1.79 m²       Physical Exam   Physical Exam  Vitals reviewed.   Constitutional:       Appearance: He is ill-appearing.   HENT:      Head: Normocephalic and atraumatic.      Mouth/Throat:      Mouth: Mucous membranes are moist.   Eyes:      Extraocular Movements: Extraocular movements intact.      Pupils: Pupils are equal, round, and reactive to light.   Cardiovascular:      Rate and Rhythm: Normal rate and regular rhythm.      Pulses: Normal pulses.      Heart sounds: Normal heart sounds. No murmur heard.     No gallop.   Pulmonary:      Effort: Pulmonary effort is normal.      Breath sounds: Normal breath sounds.   Abdominal:      General: Abdomen is flat. The ostomy site is clean. There is no distension.      Palpations: Abdomen is soft.      Tenderness: There is no abdominal tenderness.      Comments: Lower left ostomy bag in place with good output   Musculoskeletal:      Right lower leg: No edema.      Left lower leg: No edema.   Skin:     General: Skin is warm and dry.   Neurological:      Mental Status: Mental status is at baseline.      Comments: Responsive to commands, able to squeeze hands   Psychiatric:         Mood and Affect: Mood normal.         Behavior: Behavior normal.         IOs    Intake/Output Summary (Last 24 hours) at 12/7/2023 1316  Last data filed at 12/7/2023 1200  Gross per 24 hour   Intake 1717.81 ml   Output 455 ml   Net 1262.81 ml       Vent settings:   Vent Mode: Pressure support  FiO2 (%):  [30 %] 30 " "%  S RR:  [14] 14  S VT:  [450 mL] 450 mL  PEEP/CPAP (cm H2O):  [5 cm H20] 5 cm H20  AZ SUP:  [5 cm H20] 5 cm H20  MAP (cm H2O):  [9-15] 9     Labs:   Results from last 72 hours   Lab Units 12/07/23  0443 12/06/23  0654 12/05/23  2356 12/05/23  0104   SODIUM mmol/L 142 141 140 140   POTASSIUM mmol/L 3.9 3.5 2.9* 4.5   CHLORIDE mmol/L 104 102 99 97*   CO2 mmol/L 26 28 28 35*   BUN mg/dL 35* 37* 37* 34*   CREATININE mg/dL 1.40* 1.41* 1.43* 1.72*   GLUCOSE mg/dL 90 165* 175* 141*   CALCIUM mg/dL 8.5* 8.4* 8.3* 8.8   ANION GAP mmol/L 16 15 16 13   EGFR mL/min/1.73m*2 50* 49* 48* 39*   PHOSPHORUS mg/dL 3.2 3.5  --  8.3*      Results from last 72 hours   Lab Units 12/07/23 0443 12/06/23  0654 12/05/23  2356   WBC AUTO x10*3/uL 16.6* 21.1* 24.7*   HEMOGLOBIN g/dL 9.5* 10.3* 11.1*   HEMATOCRIT % 29.7* 32.3* 32.3*   PLATELETS AUTO x10*3/uL 208 242 246   NEUTROS PCT AUTO % 90.7 91.8 94.1   LYMPHS PCT AUTO % 3.5 3.5 1.7   MONOS PCT AUTO % 5.0 3.7 3.4   EOS PCT AUTO % 0.0 0.0 0.0      Lab Results   Component Value Date    CALCIUM 8.5 (L) 12/07/2023    PHOS 3.2 12/07/2023      Lab Results   Component Value Date    CRP 7.55 (A) 08/29/2020      [unfilled]     Micro/ID:   Susceptibility data from last 90 days.  Collected Specimen Info Organism   12/05/23 Fluid from Tracheal Aspirate Normal throat shanel                    No lab exists for component: \"AGALPCRNB\"   .ID  Lab Results   Component Value Date    URINECULTURE No significant growth 12/06/2023    BLOODCULT No growth at 2 days 12/04/2023       Images  CT chest wo IV contrast  Narrative: Interpreted By:  John Bone and Benza Andrew   STUDY:  CT CHEST WO IV CONTRAST;  12/6/2023 6:27 am      INDICATION:  Signs/Symptoms:pneumomediastinum.      COMPARISON:  CT chest abdomen pelvis dated 12/05/2023      ACCESSION NUMBER(S):  [id number]      ORDERING CLINICIAN:  DEE FLORES      TECHNIQUE:  Helical data acquisition of the chest was obtained  without " IV  contrast material.  Images were reformatted in axial, coronal, and  sagittal planes.      FINDINGS:  LUNGS AND AIRWAYS:  The trachea and central airways are patent. No endobronchial lesion.  Endotracheal tube again terminates 4 cm above the gayla.      Again seen is extensive centrilobular emphysema. There is similar  appearance of prominent consolidation involving the inferior lingula  and lateral and posteromedial basal segments of the left lower lobe.  Multifocal tree-in-bud and nodular opacities are again seen in the  bilateral lungs. Similar appearance of additional consolidative  opacities in the bilateral posterior lower lobes. Bronchial wall  thickening is again noted. No pleural effusion or pneumothorax.      MEDIASTINUM AND MATT, LOWER NECK AND AXILLA:  The visualized thyroid gland is within normal limits.      Prominent mediastinal lymph nodes are unchanged measuring up to 1 cm  in short axis diameter.      NG tube within the esophagus. There is a fluid filled esophagus and  can not exclude esophageal thickening.      HEART AND VESSELS:  The thoracic aorta is of normal course and caliber with severe  vascular calcifications.      Main pulmonary artery and its branches are normal in caliber.      Severe coronary artery calcifications are seen. The study is not  optimized for evaluation of coronary arteries.      The cardiac chambers are not enlarged.      No evidence of pericardial effusion.      UPPER ABDOMEN:  A calcified stone is noted in the gallbladder lumen. Partial  visualization of left renal staghorn calculus. Otherwise the  visualized subdiaphragmatic structures demonstrate no remarkable  findings. Enteric tube terminates in the gastric body.      CHEST WALL AND OSSEOUS STRUCTURES:  There is slight interval decrease in subcutaneous emphysema. There is  residual pneumomediastinum.      Impression: 1.  Similar appearance of the chest compared to prior examination  with extensive bilateral  pneumonia superimposed on severe emphysema.  2. Visualized nodular opacities are likely infectious. However,  recommend post treatment follow-up CT in 1-2 months to rule out mass.  3. Slight interval improvement in pneumomediastinum. Mediastinal air  adjacent to the esophagus and a fluid-filled esophagus. Correlate  with concern for esophageal pathology.  4. Mediastinal lymphadenopathy is likely reactive.      I personally reviewed the images/study and I agree with the findings  as stated above by resident physician, Ravindra Darnell MD. This study  was interpreted at [facility],  [place], [place].      MACRO:  None      Signed by: John Bone 12/7/2023 7:32 AM  Dictation workstation:   [id number]      Meds  Scheduled medications  heparin (porcine), 5,000 Units, subcutaneous, q8h  piperacillin-tazobactam, 3.375 g, intravenous, q6h      Continuous medications     PRN medications  PRN medications: albuterol, oxygen, polyethylene glycol     ASSESSMENT/PLAN  83 y.o. male with HTN, possible COPD (2L baseline), colon cancer s/p partial colectomy with end colostomy, and PVD who presents as a transfer from [place] ED with AHRF requiring intubation and septic shock on pressors. Transfer initiated for CT findings concerning for necrotizing process of the upper chest/neck requiring urgent ENT/thoracic surgery evaluation.      Interval events:  -off sedation and extubated  -ENT contacted for re-evaluation  -needs SLP/swallow eval after clearing ENT evaluation  -discontinue vanc/clinda  -consider art / LIJ removal if pressures are stable    CNS  #Recent sedation  -off fentanyl this AM and extubated  -to be evaluated by ENT   -needs SLP / swallow eval     PULM  #Acute on chronic mixed resp failure  #Hospital acquired pneumonia  #suspected COPD/emphysema - 2L baseline  -discontinue solumedrol, no wheezing on exam  -continue abx as below     #RLL spiculated mass, 14 x 9 mm   -possibly cancerous,  could explain weight loss  -F/u CT in 1-2 months     CV  #Septic shock  -formal TTE 12/5: EF 60%, IVC dilated  -off pressors / steroids     #Hx of hypertension  -holding home metoprolol / HCTZ     ENT/Thoracic  #Subcutaneous emphysema near/in the danger space  #Mediastinal LAD  -CT chest read: significant soft tissue gas at the base of the neck predominantly to the right extending in the right supraclavicular region and axilla but not into the mediastinum  -CT neck: Subcutaneous emphysema extends from the right of the supraclavicular region and axilla anteriorly and medially to the retropharyngeal or danger space  -repeat CT neck / chest - no significant changes  -Thoracic surg - no interventions at this time  -ENT - to re-evaluate patient.     ID  #Septic shock  #HAP  -source(s): PNA, UTI  -UCX mixed probable contaminant   -MRSA nares PCR neg, Urine strep and legionella neg, procal 0.8  -WBC downtrending   -cont zosyn while cultures return - day 4/7 (last day 12/10)     FEN/GI  -NPO while intubated and on pressors  -needs SLP     #Protein calorie malnutrition  -ddx: lung cancer, other malignancy (elevated protein gap), and/or end stage COPD  -nutrition consult     RENAL/  #CHARY  -bl 1.0, peak 1.7, 1.4 [place] [facility] [place]suspect septic shock  -fowler cath in place      #Urinary tract infection  #Staghorn calculus on L, mild hydronephrosis  -Repeat UCx pending      HEME/ONC  #Acute anemia  -baseline ~13, 9.5 this AM  -low likelihood for hemolysis: LDH barely elevated, no abnormalities reported on smear/diff  -not DIC, aptt wnl  -elevated INR may be nutritional deficiency, no hx of liver disease  -most likely due to septic shock     #Elevated gamma gap  -SPEP, UPEP     MSK/Skin  #Pressure wounds   -wound care consult     FEN: NPO  A: art line, LIJ CVC  O2: vent - 40%  Drips: levo  Abx: Rocephin/Azithro (12/4), Cefepime, Unasyn, Levaquin, Flagyl (12/5), vanc, clinda (12/5-12/7), Zosyn (12/5-current)  DVT PPx: SQH  GI  PPx: Protonix 40mg     CODE STATUS: FULL (confirmed on SNF paperwork)  SURROGATE DECISION MAKER: [place] (son) - [phone]     Dispo - 83 y.o. male admitted for AHRF and septic shock. Extubated. ENT to re-evaluate. Possible transfer to floor tomorrow.     Edis Page, DO  Internal Medicine, PGY-I

## 2023-12-07 NOTE — CONSULTS
Vancomycin Dosing by Pharmacy- Cessation of Therapy    Consult to pharmacy for vancomycin dosing has been discontinued by the prescriber, pharmacy will sign off at this time.    Please call pharmacy if there are further questions or re-enter a consult if vancomycin is resumed.     Cally Rodgers, PharmD

## 2023-12-08 NOTE — CARE PLAN
The patient's goals for the shift include velasquez    The clinical goals for the shift include maintain MAP greater than 65    Problem: Skin  Goal: Decreased wound size/increased tissue granulation at next dressing change  Outcome: Progressing  Goal: Participates in plan/prevention/treatment measures  Outcome: Progressing  Goal: Prevent/manage excess moisture  Outcome: Progressing  Goal: Prevent/minimize sheer/friction injuries  Outcome: Progressing  Goal: Promote/optimize nutrition  Outcome: Progressing  Goal: Promote skin healing  Outcome: Progressing     Problem: Chronic Conditions and Co-morbidities  Goal: Patient's chronic conditions and co-morbidity symptoms are monitored and maintained or improved  Outcome: Progressing     Problem: Discharge Planning  Goal: Discharge to home or other facility with appropriate resources  Outcome: Progressing     Problem: Safety - Adult  Goal: Free from fall injury  Outcome: Progressing     Problem: Pain - Adult  Goal: Verbalizes/displays adequate comfort level or baseline comfort level  Outcome: Progressing

## 2023-12-08 NOTE — CARE PLAN
Problem: PT Problem  Goal: Pt will perform supine to sit transfer with mod A   Outcome: Progressing  Goal: Pt will perform bed to chair transfer with mod A and no LOB  Outcome: Progressing  Goal: Pt will perform sit to stand transfer with mod A and LRAD  Outcome: Progressing

## 2023-12-08 NOTE — PROGRESS NOTES
"Daniel Robb is a 83 y.o. male on Hospital Day: 4 of admission presenting with Acute hypoxemic respiratory failure (CMS/HCC).    SUBJECTIVE    Overnight Events: Choked on secretions overnight requiring suctioning. Repeat CXR showed worsening consolidations. IJ line not removed.     This morning, he appears to be having worsening oxygen requirements. Currently on Airvo 30/65. No pressors required. He says he does not feel well and has a lot of congestion and productive cough.     OBJECTIVE  Vitals  Visit Vitals  /79   Pulse (!) 123   Temp 36 °C (96.8 °F) (Temporal)   Resp (!) 31   Ht 1.88 m (6' 2.02\")   Wt 59.5 kg (131 lb 2.8 oz)   SpO2 99%   BMI 16.83 kg/m²   Smoking Status Former   BSA 1.76 m²       Physical Exam   Physical Exam  Vitals reviewed.   Constitutional:       Appearance: He is ill-appearing and toxic-appearing.   HENT:      Head: Normocephalic and atraumatic.      Nose:      Comments: Airvo in place.     Mouth/Throat:      Mouth: Mucous membranes are moist.   Eyes:      Extraocular Movements: Extraocular movements intact.      Pupils: Pupils are equal, round, and reactive to light.   Cardiovascular:      Rate and Rhythm: Normal rate and regular rhythm.      Pulses: Normal pulses.      Heart sounds: Normal heart sounds. No murmur heard.     No gallop.   Pulmonary:      Effort: Pulmonary effort is normal.      Breath sounds: Normal breath sounds.   Abdominal:      General: Abdomen is flat. The ostomy site is clean. There is no distension.      Palpations: Abdomen is soft.      Tenderness: There is no abdominal tenderness.      Comments: Lower left ostomy bag in place with good output   Musculoskeletal:      Right lower leg: No edema.      Left lower leg: No edema.   Skin:     General: Skin is warm and dry.   Neurological:      Mental Status: Mental status is at baseline.      Comments: Responsive to commands, able to squeeze hands   Psychiatric:         Mood and Affect: Mood normal.         " "Behavior: Behavior normal.           IOs    Intake/Output Summary (Last 24 hours) at 12/8/2023 1542  Last data filed at 12/8/2023 1300  Gross per 24 hour   Intake 367.92 ml   Output 1100 ml   Net -732.08 ml       Vent settings:   FiO2 (%):  [30 %-90 %] 90 %     Labs:   Results from last 72 hours   Lab Units 12/08/23 0524 12/07/23  0443 12/06/23  0654   SODIUM mmol/L 145 142 141   POTASSIUM mmol/L 3.2* 3.9 3.5   CHLORIDE mmol/L 105 104 102   CO2 mmol/L 28 26 28   BUN mg/dL 34* 35* 37*   CREATININE mg/dL 1.29 1.40* 1.41*   GLUCOSE mg/dL 67* 90 165*   CALCIUM mg/dL 8.5* 8.5* 8.4*   ANION GAP mmol/L 15 16 15   EGFR mL/min/1.73m*2 55* 50* 49*   PHOSPHORUS mg/dL 3.4 3.2 3.5      Results from last 72 hours   Lab Units 12/08/23  0524 12/07/23  0443 12/06/23  0654   WBC AUTO x10*3/uL 13.3*  13.3* 16.6* 21.1*   HEMOGLOBIN g/dL 9.5*  9.5* 9.5* 10.3*   HEMATOCRIT % 30.4*  30.4* 29.7* 32.3*   PLATELETS AUTO x10*3/uL 180  180 208 242   NEUTROS PCT AUTO % 83.8 90.7 91.8   LYMPHS PCT AUTO % 6.7 3.5 3.5   MONOS PCT AUTO % 8.7 5.0 3.7   EOS PCT AUTO % 0.0 0.0 0.0      Lab Results   Component Value Date    CALCIUM 8.5 (L) 12/08/2023    PHOS 3.4 12/08/2023      Lab Results   Component Value Date    CRP 7.55 (A) 08/29/2020      [unfilled]     Micro/ID:   Susceptibility data from last 90 days.  Collected Specimen Info Organism   12/05/23 Fluid from Tracheal Aspirate Normal throat shanel                    No lab exists for component: \"AGALPCRNB\"   .ID  Lab Results   Component Value Date    URINECULTURE No significant growth 12/06/2023    BLOODCULT No growth at 3 days 12/04/2023       Images  XR chest 1 view  Narrative: Interpreted By:  Bert Oro,  and Lencho Kaur   STUDY:  XR CHEST 1 VIEW;  12/8/2023 11:41 am      INDICATION:  Signs/Symptoms:Possible aspiration. Increasing oxygen demands..      COMPARISON:  Chest x-ray 12/07/2023 and CT scan 12/06/2023.      ACCESSION NUMBER(S):  [id number]      ORDERING " CLINICIAN:  HELENA EVERETT      FINDINGS:  AP radiograph of the chest was provided. Patient is again rotated  towards left. Left IJ approach central venous catheter tip projects  over lower SVC, unchanged.      CARDIOMEDIASTINAL SILHOUETTE:  Cardiomediastinal silhouette is stable in size and configuration.  Aortic knob calcifications.      LUNGS:  There has been interval increased lower lung opacities,  left-greater-than-right, when compared to the prior exam. There are  coarse interstitial markings throughout bilateral lungs. Again noted  are lucencies in bilateral lung bases.      ABDOMEN:  No remarkable upper abdominal findings.      BONES:  No acute osseous changes.      Impression: 1. Interval slight increase in bibasilar opacification, left more  than right, which may represent increasing pleural effusion and/or  consolidations.  2. Medical devices as above.              MACRO:  None      Signed by: Bert Oro 12/8/2023 12:10 PM  Dictation workstation:   [id number]  ECG 12 lead  Sinus bradycardia with Premature atrial complexes  Right bundle branch block  Abnormal ECG  When compared with ECG of 22-SEP-2022 17:06,  Previous ECG has undetermined rhythm, needs review  Right bundle branch block is now Present  XR chest 1 view  Narrative: Interpreted By:  Yosef Chester and Benza Andrew   STUDY:  XR CHEST 1 VIEW;  12/7/2023 8:42 pm      INDICATION:  Signs/Symptoms:Desaturation.      COMPARISON:  Chest radiograph dated 12/06/2023; chest CT dated 12/06/2023, CT neck  12/06/2023      ACCESSION NUMBER(S):  [id number]      ORDERING CLINICIAN:  BEHZAD CUNNINGHAM      FINDINGS:  AP radiograph of the chest was provided.      Left internal jugular approach central venous catheter tip projects  over the lower SVC.      CARDIOMEDIASTINAL SILHOUETTE:  Cardiomediastinal silhouette is normal in size and configuration.  Aortic knob calcifications are noted.      LUNGS:  Advanced chronic obstructive pulmonary disease and emphysema  is  noted. Apical pleural opacity within the right upper lobe is noted.  Several loculated air collections are noted within the right lung  apex and overlying the right lower neck. Increased lower lobe  opacities are demonstrated in comparison to previous study  12/03/2023. Coarse interstitial lung markings are demonstrated  bilaterally.      Subcutaneous emphysema is noted within the lower right neck.      ABDOMEN:  No remarkable upper abdominal findings.      BONES:  No acute osseous changes.      Impression: 1. Increased pulmonary opacity within the lung bases consistent with  infiltrate/atelectasis and small bilateral pleural effusions within  lower lobe segments.  2. Apical pleural fluid density within the right upper lobe with  associated focal areas of air.  3. Pneumomediastinum and subcutaneous air within the lower neck is  confirmed on prior CT examinations.          I personally reviewed the images/study and I agree with the findings  as stated above by resident physician, Ravindra Darnell MD. This study  was interpreted at [place], [place].          MACRO:  None.      Signed by: Yosef Chester 12/8/2023 9:51 AM  Dictation workstation:   [id number]      Meds  Scheduled medications  heparin (porcine), 5,000 Units, subcutaneous, q8h  piperacillin-tazobactam, 3.375 g, intravenous, q6h  potassium chloride, 40 mEq, intravenous, Once      Continuous medications  dextrose 10 % in water (D10W), 50 mL/hr, Last Rate: 50 mL/hr (12/08/23 1312)      PRN medications  PRN medications: albuterol, dextrose 10 % in water (D10W), dextrose, glucagon, oxygen, polyethylene glycol     ASSESSMENT/PLAN  83 y.o. male with HTN, possible COPD (2L baseline), colon cancer s/p partial colectomy with end colostomy, and PVD who presents as a transfer from [place] ED with AHRF requiring intubation and septic shock on pressors. Transfer initiated for CT findings concerning for necrotizing process  of the upper chest/neck requiring urgent ENT/thoracic surgery evaluation.      Interval events:  -increasing oxygen demands - on Airvo now  -ENT contacted for re-evaluation - scoped - no fistula found  -SLP eval ordered - will hold off for now due to aspiration concerns and possible need for intubation  -starting D10w as blood sugar is low, monitor Na levels with repeat evening RFP  -family updated - currently out of town. Will stop in tomorrow to discuss goals of care  -SPEP/UPEP results back - recommend further evaluation after current illness     CNS  #Recent sedation  -off fentanyl this AM and extubated  -Holding SLP / swallow eval until breathing improves     PULM  #Acute on chronic mixed resp failure  #Hospital acquired pneumonia  #suspected COPD/emphysema - 2L baseline  -continue abx as below  -worsening oxygen requirements - currently on Airvo  -may require intubation for airway protection, discussed with family and family agreed     #RLL spiculated mass, 14 x 9 mm   -possibly cancerous, could explain weight loss  -F/u CT in 1-2 months     CV  #Septic shock  -formal TTE 12/5: EF 60%, IVC dilated  -off pressors / steroids     #Hx of hypertension  -holding home metoprolol / HCTZ     ENT/Thoracic  #Subcutaneous emphysema near/in the danger space  #Mediastinal LAD  -CT chest read: significant soft tissue gas at the base of the neck predominantly to the right extending in the right supraclavicular region and axilla but not into the mediastinum  -CT neck: Subcutaneous emphysema extends from the right of the supraclavicular region and axilla anteriorly and medially to the retropharyngeal or danger space  -repeat CT neck / chest - no significant changes  -Thoracic surg - no interventions at this time  -ENT scoped pt through nares - no acute findings and signing off     ID  #Septic shock  #HAP  -source(s): PNA, UTI  -UCX mixed probable contaminant   -MRSA nares PCR neg, Urine strep and legionella neg, procal  0.8  -WBC downtrending   -cont zosyn while cultures return - day 5/7 (last day 12/10)     FEN/GI  -NPO while intubated and on pressors  -needs SLP     #Protein calorie malnutrition  -ddx: lung cancer, other malignancy (elevated protein gap), and/or end stage COPD  -nutrition consult     RENAL/  #CHARY - improving  -bl 1.0, peak 1.7, 1.4 on admit to [facility]  -suspect septic shock  -fowler cath in place      #Urinary tract infection  #Staghorn calculus on L, mild hydronephrosis  -Repeat UCx - neg      HEME/ONC  #Acute anemia  -baseline ~13, 9.5 this AM  -low likelihood for hemolysis: LDH barely elevated, no abnormalities reported on smear/diff  -not DIC, aptt wnl  -elevated INR may be nutritional deficiency, no hx of liver disease  -most likely due to septic shock     #Elevated gamma gap  -SPEP - elevated m-protein / gamma band - recommend repeat testing after resolution of illness  -UPEP - elevated m-protein, free light chains 9% of total protein - recommend evaluation of plasma cell dyscrasia     MSK/Skin  #Pressure wounds   -wound care consult     FEN: NPO  A: left arterial line, LIJ CVC  O2: Airvo   Drips: D10W  Abx: Rocephin/Azithro (12/4), Cefepime, Unasyn, Levaquin, Flagyl (12/5), vanc, clinda (12/5-12/7), Zosyn (12/5-current)  DVT PPx: SQH  GI PPx: None     CODE STATUS: FULL (confirmed on SNF paperwork)  SURROGATE DECISION MAKER: [place] (son) - [phone]     Dispo - 83 y.o. male admitted for AHRF and septic shock. Extubated. ENT - no acute findings. Increasing oxygen demands and currently on Airvo. Possible intubation.      Edis Page, DO  Internal Medicine, PGY-I

## 2023-12-08 NOTE — PROGRESS NOTES
Occupational Therapy    Evaluation/Treatment    Patient Name: Daniel Robb  MRN: [id number]  : 1940  Today's Date: 23  Time Calculation  Start Time: 953  Stop Time:   Time Calculation (min): 31 min     Assessment:  OT Assessment: impaired ADLs/transfers  Prognosis: Good  End of Session Communication: Bedside nurse  End of Session Patient Position: Bed, 3 rail up, Alarm off, not on at start of session  OT Assessment Results: Decreased ADL status, Decreased upper extremity range of motion, Decreased upper extremity strength, Decreased endurance, Decreased functional mobility  Prognosis: Good  Plan:  Treatment Interventions: ADL retraining, Functional transfer training, UE strengthening/ROM, Endurance training, Patient/family training, Equipment evaluation/education, Neuromuscular reeducation, Compensatory technique education  OT Frequency: 3 times per week  OT Discharge Recommendations: Moderate intensity level of continued care  OT - OK to Discharge: Yes  Treatment Interventions: ADL retraining, Functional transfer training, UE strengthening/ROM, Endurance training, Patient/family training, Equipment evaluation/education, Neuromuscular reeducation, Compensatory technique education    Subjective   General:   OT Received On: 23  General  Reason for Referral: presenting as a transfer from [place] for acute hypoxic respiratory failure in the setting of sepsis on levo and found to have subcutaneous air in the retropharynx and deep neck space. ENT consulted for management and rule out of necrotizing fasciitis.  Past Medical History Relevant to Rehab: HTN, possible COPD (2L baseline), colon cancer s/p partial colectomy with end colostomy, and PVD  Family/Caregiver Present: No  Co-Treatment: PT  Co-Treatment Reason: to maximize patient safety, mobility and therapeutic gains 2/2 needing x2 skilled assist, unsuccessful mobilization attempts by nursing  Prior to Session Communication: Bedside  nurse  Patient Position Received: Bed, 3 rail up, Alarm off, not on at start of session  General Comment: patient supine in bed, pleasant and agreeable to OT; tele, IV, AirVo 30L/50%  Precautions:  Hearing/Visual Limitations: hearing is grossly WFL  Medical Precautions: Fall precautions, Oxygen therapy device and L/min  Vital Signs:  Heart Rate:  (pre 85, post 82)  Resp:  (pre 26, post 20)  SpO2:  (pre 96%, post 92%)  BP:  (pre 133/48, /51, post 141/70)  Pain:  Pain Assessment  Pain Assessment: 0-10  Pain Score: 7 (front trunk chest/abdomen pain, RN notified)    Objective   Cognition:  Overall Cognitive Status: Within Functional Limits  Arousal/Alertness: Appropriate responses to stimuli  Orientation Level: Oriented X4 (CAM-ICU (-))  Following Commands: Follows one step commands with repetition     Home Living:  Type of Home: Assisted living  Lives With: Alone  Home Adaptive Equipment: Wheelchair-manual  Home Layout: One level  Home Access: No concerns  Bathroom Shower/Tub: Walk-in shower  Prior Function:  Level of Bibb:  (Patient reports that he was independent with transfers to and from w/c with slide board, but has become progressively weaker in recent weeks. Patient reports assist with IADLs, but was independent with ADLs prior to admission.)  Receives Help From:  (staff at facility)  ADL Assistance: Independent  Homemaking Assistance: Needs assistance  Ambulatory Assistance:  (was independent at wheelchair level)  Hand Dominance: Right  IADL History:     ADL:  Eating Assistance: Independent  Eating Deficit:  (anticipated)  Grooming Assistance: Stand by  Bathing Assistance: Moderate  Bathing Deficit:  (anticipated)  UE Dressing Assistance: Minimal  UE Dressing Deficit:  (anticipated)  LE Dressing Assistance: Total  LE Dressing Deficit: Don/doff R sock, Don/doff L sock  Toileting Assistance with Device: Maximal  Toileting Deficit:  (anticipated)     Bed Mobility/Transfers: Bed Mobility  Bed  Mobility: Yes  Bed Mobility 1  Bed Mobility 1: Supine to sitting, Sitting to supine  Level of Assistance 1: Dependent, Moderate verbal cues  Bed Mobility Comments 1: x2 assist; HOB elevated + draw sheet    Transfers  Transfer: No    Sitting Balance:  Static Sitting Balance  Static Sitting-Level of Assistance: Contact guard  Dynamic Sitting Balance  Dynamic Sitting-Comments: min-CGA     Therapy/Activity: Therapeutic Activity  Therapeutic Activity Performed: Yes  Therapeutic Activity 1: Patient sat EOB x8 additional minutes with CGA-min A for dynamic balance. Patient participated in dynamic UE and LE movement with min A for balance 2/2 posterior lean.     Vision:Vision - Basic Assessment  Current Vision: Wears glasses all the time  Sensation:  Light Touch:  (patient did not report numbness/tingling)  Strength:  Strength Comments: (B)  3+/5, (B) elbows >/= 3/5, (B) shoulder <3/5     Extremities: RUE   RUE :  ((R) shoulder AAROM ~0-90 degrees, (R) distal joints WFL) and LUE   LUE:  ((L) shoulder AAROM ~0-90 degrees, (L) distal joints WFL)    Outcome Measures: [facility] Daily Activity  Putting on and taking off regular lower body clothing: Total  Bathing (including washing, rinsing, drying): A lot  Putting on and taking off regular upper body clothing: A little  Toileting, which includes using toilet, bedpan or urinal: A lot  Taking care of personal grooming such as brushing teeth: A little  Eating Meals: None  Daily Activity - Total Score: 15    , Confusion Assessment Method-ICU (CAM-ICU)  Feature 1: Acute Onset or Fluctuating Course: Negative  Overall CAM-ICU: Negative  ,    , and E = Exercise and Early Mobility  Current Activity: Sitting at edge of bed    Education Documentation  Body Mechanics, taught by Susana Adkins OT at 12/8/2023  2:39 PM.  Learner: Patient  Readiness: Acceptance  Method: Explanation  Response: Needs Reinforcement    ADL Training, taught by Susana Adkins OT at 12/8/2023  2:39 PM.  Learner:  Patient  Readiness: Acceptance  Method: Explanation  Response: Needs Reinforcement    Education Comments  No comments found.       Goals:  Encounter Problems       Encounter Problems (Active)       ADLs       Patient with complete upper body dressing with independent level of assistance donning and doffing all UE clothes with no adaptive equipment. (Progressing)       Start:  12/08/23    Expected End:  12/29/23            Patient with complete lower body dressing with minimal assist  level of assistance donning and doffing all LE clothes  with PRN adaptive equipment. (Progressing)       Start:  12/08/23    Expected End:  12/29/23            Patient will complete daily grooming tasks with supervision level of assistance and PRN adaptive equipment while edge of bed. (Progressing)       Start:  12/08/23    Expected End:  12/29/23            Patient will complete toileting including hygiene clothing management/hygiene with stand by assist level of assistance. (Progressing)       Start:  12/08/23    Expected End:  12/29/23               BALANCE       Pt will maintain dynamic sitting balance during ADL task with stand by assist level of assistance in order to demonstrate decreased risk of falling and improved postural control. (Progressing)       Start:  12/08/23    Expected End:  12/29/23               EXERCISE/STRENGTHENING       Patient will complete BUE exercises in order to improve strength and activity tolerance for ADL performance.  (Progressing)       Start:  12/08/23    Expected End:  12/29/23               TRANSFERS       Patient will perform bed mobility moderate assist level of assistance in order to improve safety and independence with mobility (Progressing)       Start:  12/08/23    Expected End:  12/29/23            Patient will complete functional transfers with slideboard/LRD with moderate assist level of assistance. (Progressing)       Start:  12/08/23    Expected End:  12/29/23               Susana  Lucille, OTR/L

## 2023-12-08 NOTE — PROGRESS NOTES
Physical Therapy    Physical Therapy Evaluation & Treatment    Patient Name: Daniel Robb  MRN: [id number]  Today's Date: 12/8/2023   Time Calculation  Start Time: 0954  Stop Time: 1023  Time Calculation (min): 29 min    Assessment/Plan   PT Assessment  PT Assessment Results: Decreased strength, Impaired balance, Decreased endurance, Decreased mobility  Rehab Prognosis: Good  Evaluation/Treatment Tolerance: Patient tolerated treatment well  Medical Staff Made Aware: Yes  End of Session Communication: Bedside nurse  Assessment Comment: Pt presents to physical therapy following admission for respiratory failure. Pt participated in supine to sit transfer during session and demonstrated impaired dynamic balance, strength and endurance. It is recommended that the patient continue to receive skilled physical therapy to improve overall functional mobility.  End of Session Patient Position: Bed, 3 rail up, Alarm on   IP OR SWING BED PT PLAN  Inpatient or Swing Bed: Inpatient  PT Plan  Treatment/Interventions: Bed mobility, Transfer training, Gait training, Balance training, Stair training, Strengthening, Therapeutic exercise, Range of motion, Therapeutic activity, Home exercise program, Positioning, Postural re-education, Neuromuscular re-education, Endurance training  PT Plan: Skilled PT  PT Frequency: 3 times per week  PT Discharge Recommendations: Moderate intensity level of continued care  Equipment Recommended upon Discharge:  (TBD)  PT Recommended Transfer Status: Assist x2  PT - OK to Discharge: Yes (Once medically cleared)      Subjective     General Visit Information:  General  Reason for Referral: Daniel Robb is a 83 y.o. male who is presenting as a transfer from [place] for acute hypoxic respiratory failure in the setting of sepsis on levo and found to have subcutaneous air in the retropharynx and deep neck space.  ENT consulted for management and rule out of necrotizing fasciitis.  Past Medical History  Relevant to Rehab: HTN, possible COPD (2L baseline), colon cancer s/p partial colectomy with end colostomy, and PVD  Family/Caregiver Present: No  Co-Treatment: OT  Co-Treatment Reason: To improve mobility and maximize safety  Prior to Session Communication: Bedside nurse  Patient Position Received: Bed, 4 rail up, Alarm on  General Comment: Pt found supine in bed with (+) PIV, airvo 50% 20L  Home Living:  Home Living  Type of Home: Assisted living  Lives With: Alone  Home Adaptive Equipment: Wheelchair-manual  Home Layout: One level  Home Access:  (no stairs to enter facility)  Bathroom Shower/Tub:  (pt typically sponge bathes)  Prior Level of Function:  Prior Function Per Pt/Caregiver Report  Level of Pope: Other (Comment) (Pt reports that he was independent with transfers to and from w/c with slide board, but has become progressively weaker in recent weeks. Pt reports assist with IADLs, but was independent with ADLs prior to admission.,)  Receives Help From:  (facility care)  ADL Assistance: Independent  Homemaking Assistance: Needs assistance  Homemaking Assistance Comments: see above  Ambulatory Assistance: Needs assistance (see above)  Precautions:  Precautions  Medical Precautions: Fall precautions  Vital Signs:  Vital Signs  Heart Rate:  (pre: 92 post: 86)  SpO2:  (pre: 98% post: 91%)  BP:  (pre: 133/48 (76) post: 141/70)    Objective   Pain:  Pain Assessment  Pain Assessment: 0-10  Pain Score: 7  Pain Location: Back  Pain Interventions: Repositioned  Response to Interventions: PT to tolerance          Activity Tolerance  Endurance: Tolerates 10 - 20 min exercise with multiple rests    Postural Control  Postural Control: Impaired  Posture Comment: pt demonstrated increased thoracic kyphosis    Static Sitting Balance  Static Sitting-Balance Support: Feet supported, Bilateral upper extremity supported  Static Sitting-Level of Assistance: Contact guard  Dynamic Sitting Balance  Dynamic Sitting-Balance  Support: Feet supported, Bilateral upper extremity supported  Dynamic Sitting-Balance: Forward lean  Dynamic Sitting-Comments: CGA/min A       Functional Assessments:  Bed Mobility  Bed Mobility: Yes  Bed Mobility 1  Bed Mobility 1: Supine to sitting, Sitting to supine  Level of Assistance 1: Dependent  Bed Mobility Comments 1: assistx2  Extremity/Trunk Assessments:  RLE   RLE :  (RLE lacking 30 degrees knee extension)  LLE   LLE :  (LLE lacking 30 degrees knee extension)  Treatments:  Therapeutic Exercise  Therapeutic Exercise Performed: Yes  Therapeutic Exercise Activity 1: Pt performed ankle [id number], LAQx10 and seated [id number] bilaterally  Outcome Measures:  [facility] Basic Mobility  Turning from your back to your side while in a flat bed without using bedrails: Total  Moving from lying on your back to sitting on the side of a flat bed without using bedrails: Total  Moving to and from bed to chair (including a wheelchair): Total  Standing up from a chair using your arms (e.g. wheelchair or bedside chair): Total  To walk in hospital room: Total  Climbing 3-5 steps with railing: Total  Basic Mobility - Total Score: 6    FSS-ICU  Ambulation: Unable to attempt due to weakness  Rolling: Maximal assistance (performs 25% - 49% of task)  Sitting: Supervision or set-up only  Transfer Sit-to-Stand: Unable to perform  Transfer Supine-to-Sit: Total assistance (performs 25% or requires another person)  Total Score: 8    Encounter Problems       Encounter Problems (Active)       PT Problem       Pt will perform supine to sit transfer with mod A  (Progressing)       Start:  12/08/23    Expected End:  12/29/23            Pt will perform bed to chair transfer with mod A and no LOB (Progressing)       Start:  12/08/23    Expected End:  12/29/23            Pt will perform sit to stand transfer with mod A and LRAD (Progressing)       Start:  12/08/23    Expected End:  12/29/23               Pain - Adult              Education  Documentation  Body Mechanics, taught by Alba Fuller, PT at 12/8/2023 12:56 PM.  Learner: Patient  Readiness: Acceptance  Method: Explanation  Response: Verbalizes Understanding    Mobility Training, taught by Alba Fuller, FAHEEM at 12/8/2023 12:56 PM.  Learner: Patient  Readiness: Acceptance  Method: Explanation  Response: Verbalizes Understanding    Education Comments  No comments found.

## 2023-12-08 NOTE — PROGRESS NOTES
"Daniel Robb is a 83 y.o. male on day 3 of admission presenting with Acute hypoxemic respiratory failure (CMS/HCC). No acute events overnight. Flap checks stable.    Subjective   ENT re-engaged this morning due to concern for increasing oxygen requirements and primary team requested scope evaluation. Desatted to 70 upon walking into the room before any instrumentation, recovered spontaneously without intervention to 90s over about 30 seconds.        Objective     General: Inc work of breathing, able to generate very minimal cough even with effort    Resp: Airvo in place, no stridor  Head: Atraumatic, normocephalic  Oral Cavity: mucous membranes dry  Ears: Normal external ears  Nose: Normal external nose    Last Recorded Vitals  Blood pressure 156/79, pulse (!) 123, temperature 36 °C (96.8 °F), temperature source Temporal, resp. rate (!) 31, height 1.88 m (6' 2.02\"), weight 59.5 kg (131 lb 2.8 oz), SpO2 99 %.  Intake/Output last 3 Shifts:  I/O last 3 completed shifts:  In: 1174.9 (19.7 mL/kg) [I.V.:74.9 (1.3 mL/kg); IV Piggyback:1100]  Out: 1465 (24.6 mL/kg) [Urine:965 (0.5 mL/kg/hr); Stool:500]  Weight: 59.5 kg     Relevant Results        Scheduled medications  heparin (porcine), 5,000 Units, subcutaneous, q8h  piperacillin-tazobactam, 3.375 g, intravenous, q6h  potassium chloride, 40 mEq, intravenous, Once      Continuous medications  dextrose 10 % in water (D10W), 50 mL/hr, Last Rate: 50 mL/hr (12/08/23 1312)      PRN medications  PRN medications: albuterol, dextrose 10 % in water (D10W), dextrose, glucagon, oxygen, polyethylene glycol     Procedure Note: Flexible Nasolaryngoscopy  Verbal informed consent was obtained from the patient/patient's guardian. Given concern for respiratory status and risk for aspiration, no afrin or lidocaine were instilled into nares. A flexible fiberoptic nasolaryngoscope was placed into the patient's left naris. The nasal cavity, nasopharynx, oropharynx, hypopharynx, and all " endolaryngeal structures were visualized and were normal except as listed below. Significant findings included:  -No obvious lesions, moderate pooling of secretions       Assessment/Plan   Principal Problem:    Acute hypoxemic respiratory failure (CMS/HCC)    Daniel Robb is a 83 y.o. male who presented as a transfer from [place] for acute hypoxic respiratory failure in the setting of sepsis on levo and found to have subcutaneous air in the retropharynx and deep neck space.  ENT consulted for management and rule out of necrotizing fasciitis.  Exam without palpable crepitus or necrosis noted.  No fluctuance. No acute ENT intervention indicated at that time. ENT re-engaged 12/8 due to concern for increasing oxygen requirement; scope without evidence of lesions, noted to have minimal cough despite effort.      Recommendations  - No ENT interventions needed at this time   - ENT will sign off, please re-consult with any further questions or concerns     Discussed with attending Dr. Bach.       Rosana Vaughan MD   PGY-1  Otolaryngology - Head & Neck Surgery  [facility]    ENT Consult Pager: [phone]  Head and Neck Phone: [phone]  ENT Peds Pager: [phone]  ENT Subspecialty Team: Alexa

## 2023-12-08 NOTE — CARE PLAN
Problem: Safety - Medical Restraint  Goal: Remains free of injury from restraints (Restraint for Interference with Medical Device)  12/8/2023 1012 by Phuong Orozco RN  Outcome: Met  12/8/2023 1011 by Phuong Orozco RN  Outcome: Progressing  Goal: Free from restraint(s) (Restraint for Interference with Medical Device)  12/8/2023 1012 by Phuong Orozco RN  Outcome: Met  12/8/2023 1011 by Phuong Orozco RN  Outcome: Progressing   The patient's goals for the shift include velasquez    The clinical goals for the shift include maintain MAP greater than 65

## 2023-12-08 NOTE — PROGRESS NOTES
Speech-Language Pathology  Therapy Communication Note  Patient Name: Daniel Robb  MRN: [id number]  Today's Date: 12/8/2023   Time: 1030    Discipline: Speech-Language Pathology    Reason: Attempt    Comment:  Bedside swallow evaluation attempted. Per RN, pt being kept NPO 2/2 current respiratory status; is requiring HFNC at this time.     Will hold off on swallow evaluation until pt appropriate/schedule permits.

## 2023-12-09 NOTE — PROCEDURES
Intubation Note    It was an emergent procedure. Patient was verbally consented for the procedure.     A time out was performed. My hands were washed immediately prior to the procedure. I wore a surgical cap, mask with protective eyewear, gown and gloves throughout the procedure. The patient was placed on a cardiac monitor including continuous pulse oximetry.  The patient received 100mcg fentanyl as well as 20mg etomidate  and 100mg rocuroniumfor the procedure.   Using a glidescope and a size MAC S3 7.5 endotracheal tube with stylet, the patient was intubated on the first attempt. The stylet was removed and cuff balloon was inflated. Appropriate endotracheal tube position was confirmed by direct visualization of vocal cord passage, fogging of the tube, CO2 colormetric indicator and symmetric breath sounds. The tube was secured at 25 cm at the lips. Post intubation chest x-ray was showed appropriate position of the ET tube.

## 2023-12-09 NOTE — SIGNIFICANT EVENT
Revisited goals of care conversation with Mr. Robb's son, Dayton ([phone]) and Mr. Robb's daughter in law.    We discussed moving forward that if trach and peg are not within Mr. Robb's goals of care, we can either continue to do our best to optimize him for extubation or perform a terminal extubation knowing that regardless of what we do, he will inevitably continue to aspirate and likely decompensate.    They were clear that we should not reintubate Mr. Robb if he is extubated.    As far as extubation, given that Mr. Robb is on minimal vent settings, we could perform an SBT to see how he may do after extubation. We decided that regardless of whether or not Mr. Robb passes his SBT, we will extubate him and we will use the SBT to prognosticate how much time he may have after extubation given that he was able to pass the SBT the other day and did okay from a respiratory standpoint for about 12 hours.    We also discussed stopping pressors once we extubate and they agreed that stopping them after extubation makes sense.    Dayton stated that Mr. Robb was estranged from his extended family and there is not anyone else who would want to come see Mr. Robb before he dies. Dayton and his wife are planning to leave this afternoon and do not feel that they need to be at his bedside for his extubation.    Conclusions:    Code status is DNAR/DNI- we will not reintubate once extubated.    We will perform SBT and regardless of whether Mr. Robb passes the SBT or not, we will extubate him.    I will call Dayton after the SBT and let him know that we will be extubating.    We will transition to comfort measures only after extubation. If he is not exhibiting symptoms, we will not give him any morphine or other medications. If he is having symptoms like anxiety or shortness of breath, we will use medications like morphine and other medications in the comfort care orderset to keep   Clarisse comfortable as he dies.    We will call Dayton if and when Mr. Robb dies.    Dayton's phone number is [phone].    Aleida Segura MD  Internal Medicine PGY3

## 2023-12-09 NOTE — PROGRESS NOTES
Pharmacy to Dose Vancomycin:  Daniel Robb is a 83 y.o. male ordered pharmacy to dose vancomycin for sepsis. Today is day 1 of therapy.  Goal: Trough 15-20mg/L  Results from last 7 days   Lab Units 12/09/23  0101 12/08/23  1738 12/08/23  0524 12/07/23  0443   BUN mg/dL 27* 29* 34* 35*   CREATININE mg/dL 1.16 1.08 1.29 1.40*   WBC AUTO x10*3/uL 15.6*  --  13.3*  13.3* 16.6*   VANCOMYCIN RM ug/mL  --   --   --  11.5      Staph/MRSA Screen Culture   Date/Time Value Ref Range Status   12/05/2023 03:31 AM No Staphylococcus aureus isolated  Final     Urine Culture   Date/Time Value Ref Range Status   12/06/2023 11:34 AM No significant growth  Final     Blood Culture   Date/Time Value Ref Range Status   12/04/2023 02:10 PM No growth at 4 days -  FINAL REPORT  Final     Gram Stain   Date/Time Value Ref Range Status   12/05/2023 03:30 AM (2+) Few Gram positive cocci (A)  Final   12/05/2023 03:30 AM (3+) Moderate Polymorphonuclear leukocytes (A)  Final      Renal function is unstable so will dose by level.    Plan:  Give vanco 1.5G x 1 dose today.  Follow-up level ordered for 12/10 w/ AM labs unless clinically indicated sooner.  Pharmacy will continue daily vancomycin monitoring. Please contact the pharmacy at extension [phone] with any questions.    Thank you,  Ronak Gibbs [facility]

## 2023-12-09 NOTE — SIGNIFICANT EVENT
Around 2155, patient had episode hypoxemia (84% O2 sat) with tachypnea. Patient was transitioned to NRB mask but no improvement noted. The situation was explained to the patient and agreed for intubation and mechanical support. Dayton his son was also called and informed about his current status. He also agreed about the  intubation  and mechanical ventilation. He said  will come to visit the patient tomorrow.

## 2023-12-09 NOTE — SIGNIFICANT EVENT
Held a conversation with Mr. Robb's son and next of kin, Dayton ([phone]) and Dayton's partner regarding Mr. Robb's clinical course and what options we have moving forward.    I began the conversation by reviewing that Mr. Robb was initially admitted for respiratory failure from pneumonia that we thought was because of aspiration given the findings on his CT chest. I then explained that we treat pneumonia with antibiotics and that seemed to improve which is why we were able to extubated him. I then discussed how when he was extubated, he had gurgling in the back of his throat and a weak cough which were concerning that he would be unable to keep his oral secretions out of his lung. These eventually made his breathing worse, even though we were not allowing him to eat, meaning he was not even able to tolerate his saliva, which resulted in being intubated again.    We discussed where things can go from here. I discussed trach and peg and talked about how if we pursued that, Mr. Robb would not be able to eat by mouth, at least initially, and likely indefinitely given that we have no reversible cause of his aspiration. Dayton felt strongly that Mr. Robb would not enjoy being unable to eat.    Dayton and his partner also talked about how Mr. 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 They felt strongly that Mr. Robb would not want to live in a nursing facility as he was clinging onto his independence by living in an assisted living facility even though he probably should have already made that transition given his decline over the last month or so.    I then explained that the alternative to trach and peg is to shift our focus from tests and treatments to managing any symptoms that may arise for Mr. Robb and stop doing  things that are not focused on his comfort. This would mean turning off his IV medications and removing the ventilator, knowing that he would continue to aspirate and eventually this would mean he would die. I explained that we would make sure he is not anxious or uncomfortable and I explained that we do not know how long he would survive without the ventilator and that it could be hours to days but unlikely to be weeks.    Dayton cited his experience with his mother's death which was also in an ICU with hospice support and he recalled a similar conversation that was had with him and his father with regard to his mother's illness and Dayton felt that Mr. Robb would not want to be kept alive with machines.    I explained that given what Dayton told me about his father's personality and how he lived his life, not doing trach and peg would make sense. Dayton agreed and said that comfort care measures would likely make sense.    At the end of our conversation, we decided to change Mr. Robb's code status to DNAR and we would discuss later about if/when to remove the ventilator.    Aleida Segura MD  Internal Medicine PGY3

## 2023-12-09 NOTE — PROGRESS NOTES
Daniel Robb is a 83 y.o. male on day 4 of admission presenting with Acute hypoxemic respiratory failure (CMS/HCC).    Subjective   Patient electively intubated overnight in setting of respiratory fatigue with desat to 84%. OG placed.    Noted to have only 300 ml urine output over past 24 hours. Bladder scan overnight negative, Peralta replaced this morning with additional 400 mL out.     This morning, moderately sedated on fentanyl and propofol. Opening eyes and following commands. Required 0.02 Levo with initiation of sedation, now requiring 0.07 despite weaning of sedation this AM.        Objective     Physical Exam  Constitutional:       Comments: Intubated. Remains alert and interactive   HENT:      Head: Normocephalic and atraumatic.      Nose: Nose normal.      Mouth/Throat:      Mouth: Mucous membranes are dry.      Pharynx: No oropharyngeal exudate or posterior oropharyngeal erythema.   Eyes:      General: No scleral icterus.     Extraocular Movements: Extraocular movements intact.      Comments: Pin-point pupils, reactive to light   Cardiovascular:      Rate and Rhythm: Normal rate and regular rhythm.      Pulses: Normal pulses.      Heart sounds: Normal heart sounds.   Pulmonary:      Effort: Pulmonary effort is normal.      Comments: Coarse breath sounds b/l  Abdominal:      General: Abdomen is flat. There is no distension.      Palpations: Abdomen is soft.      Tenderness: There is no abdominal tenderness.   Musculoskeletal:         General: Normal range of motion.      Cervical back: Normal range of motion and neck supple.      Right lower leg: No edema.      Left lower leg: No edema.   Skin:     General: Skin is warm and dry.      Findings: No lesion or rash.   Neurological:      General: No focal deficit present.   Psychiatric:      Comments: Unable to assess     Last Recorded Vitals  Blood pressure (!) 85/29, pulse 70, temperature 36.9 °C (98.4 °F), temperature source Temporal, resp. rate 18,  "height 1.88 m (6' 2.02\"), weight 59.5 kg (131 lb 2.8 oz), SpO2 100 %.  Intake/Output last 3 Shifts:  I/O last 3 completed shifts:  In: 3010.2 (50.6 mL/kg) [I.V.:1110.2 (18.7 mL/kg); IV Piggyback:1900]  Out: 1425 (23.9 mL/kg) [Urine:900 (0.4 mL/kg/hr); Stool:525]  Weight: 59.5 kg     Scheduled medications  esomeprazole, 20 mg, nasogastric tube, Daily before breakfast  heparin (porcine), 5,000 Units, subcutaneous, q8h  lactated Ringer's, 500 mL, intravenous, Once  piperacillin-tazobactam, 3.375 g, intravenous, q6h  polyethylene glycol, 17 g, oral, Daily  potassium phosphate, 21 mmol, intravenous, Once  sennosides, 2 tablet, oral, BID      Continuous medications  fentaNYL, 0-300 mcg/hr, Last Rate: 50 mcg/hr (12/09/23 1032)  norepinephrine, 0-3.3 mcg/kg/min, Last Rate: Stopped (12/09/23 0741)  propofol, 5-50 mcg/kg/min, Last Rate: 20 mcg/kg/min (12/09/23 0722)      PRN medications  PRN medications: albuterol, dextrose 10 % in water (D10W), dextrose, fentaNYL, glucagon, oxygen, oxygen, polyethylene glycol      Relevant Results  Results for orders placed or performed during the hospital encounter of 12/05/23 (from the past 24 hour(s))   Blood Gas Arterial Full Panel Unsolicited   Result Value Ref Range    POCT pH, Arterial 7.34 (L) 7.38 - 7.42 pH    POCT pCO2, Arterial 55 (H) 38 - 42 mm Hg    POCT pO2, Arterial 93 85 - 95 mm Hg    POCT SO2, Arterial 98 94 - 100 %    POCT Oxy Hemoglobin, Arterial 96.5 94.0 - 98.0 %    POCT Hematocrit Calculated, Arterial 32.0 (L) 41.0 - 52.0 %    POCT Sodium, Arterial 140 136 - 145 mmol/L    POCT Potassium, Arterial 4.0 3.5 - 5.3 mmol/L    POCT Chloride, Arterial 109 (H) 98 - 107 mmol/L    POCT Ionized Calcium, Arterial 1.15 1.10 - 1.33 mmol/L    POCT Glucose, Arterial 93 74 - 99 mg/dL    POCT Lactate, Arterial 0.9 0.4 - 2.0 mmol/L    POCT Base Excess, Arterial 3.0 -2.0 - 3.0 mmol/L    POCT HCO3 Calculated, Arterial 29.7 (H) 22.0 - 26.0 mmol/L    POCT Hemoglobin, Arterial 10.8 (L) 13.5 - " 17.5 g/dL    POCT Anion Gap, Arterial 5 (L) 10 - 25 mmo/L    Patient Temperature 37.0 degrees Celsius   POCT GLUCOSE   Result Value Ref Range    POCT Glucose 88 74 - 99 mg/dL   Renal Function Panel   Result Value Ref Range    Glucose 96 74 - 99 mg/dL    Sodium 143 136 - 145 mmol/L    Potassium 4.1 3.5 - 5.3 mmol/L    Chloride 107 98 - 107 mmol/L    Bicarbonate 30 21 - 32 mmol/L    Anion Gap 10 10 - 20 mmol/L    Urea Nitrogen 29 (H) 6 - 23 mg/dL    Creatinine 1.08 0.50 - 1.30 mg/dL    eGFR 68 >60 mL/min/1.73m*2    Calcium 8.5 (L) 8.6 - 10.6 mg/dL    Phosphorus 3.3 2.5 - 4.9 mg/dL    Albumin 2.3 (L) 3.4 - 5.0 g/dL   Troponin I, High Sensitivity   Result Value Ref Range    Troponin I, High Sensitivity 29 0 - 53 ng/L   POCT GLUCOSE   Result Value Ref Range    POCT Glucose 93 74 - 99 mg/dL   Blood Gas Arterial Full Panel   Result Value Ref Range    POCT pH, Arterial 7.36 (L) 7.38 - 7.42 pH    POCT pCO2, Arterial 56 (H) 38 - 42 mm Hg    POCT pO2, Arterial 60 (L) 85 - 95 mm Hg    POCT SO2, Arterial 90 (L) 94 - 100 %    POCT Oxy Hemoglobin, Arterial 88.4 (L) 94.0 - 98.0 %    POCT Hematocrit Calculated, Arterial 35.0 (L) 41.0 - 52.0 %    POCT Sodium, Arterial 140 136 - 145 mmol/L    POCT Potassium, Arterial 4.2 3.5 - 5.3 mmol/L    POCT Chloride, Arterial 107 98 - 107 mmol/L    POCT Ionized Calcium, Arterial 1.17 1.10 - 1.33 mmol/L    POCT Glucose, Arterial 104 (H) 74 - 99 mg/dL    POCT Lactate, Arterial 1.2 0.4 - 2.0 mmol/L    POCT Base Excess, Arterial 4.9 (H) -2.0 - 3.0 mmol/L    POCT HCO3 Calculated, Arterial 31.6 (H) 22.0 - 26.0 mmol/L    POCT Hemoglobin, Arterial 11.7 (L) 13.5 - 17.5 g/dL    POCT Anion Gap, Arterial 6 (L) 10 - 25 mmo/L    Patient Temperature 37.0 degrees Celsius    FiO2 100 %   Troponin I, High Sensitivity   Result Value Ref Range    Troponin I, High Sensitivity 30 0 - 53 ng/L   POCT GLUCOSE   Result Value Ref Range    POCT Glucose 97 74 - 99 mg/dL   Blood Gas Arterial Full Panel   Result Value Ref  Range    POCT pH, Arterial 7.30 (L) 7.38 - 7.42 pH    POCT pCO2, Arterial 58 (H) 38 - 42 mm Hg    POCT pO2, Arterial 303 (H) 85 - 95 mm Hg    POCT SO2, Arterial 100 94 - 100 %    POCT Oxy Hemoglobin, Arterial 98.0 94.0 - 98.0 %    POCT Hematocrit Calculated, Arterial 32.0 (L) 41.0 - 52.0 %    POCT Sodium, Arterial 140 136 - 145 mmol/L    POCT Potassium, Arterial 3.5 3.5 - 5.3 mmol/L    POCT Chloride, Arterial 107 98 - 107 mmol/L    POCT Ionized Calcium, Arterial 1.16 1.10 - 1.33 mmol/L    POCT Glucose, Arterial 119 (H) 74 - 99 mg/dL    POCT Lactate, Arterial 1.0 0.4 - 2.0 mmol/L    POCT Base Excess, Arterial 1.2 -2.0 - 3.0 mmol/L    POCT HCO3 Calculated, Arterial 28.5 (H) 22.0 - 26.0 mmol/L    POCT Hemoglobin, Arterial 10.5 (L) 13.5 - 17.5 g/dL    POCT Anion Gap, Arterial 8 (L) 10 - 25 mmo/L    Patient Temperature 37.0 degrees Celsius    FiO2 100 %   CBC and Auto Differential   Result Value Ref Range    WBC 15.6 (H) 4.4 - 11.3 x10*3/uL    nRBC 0.0 0.0 - 0.0 /100 WBCs    RBC 3.62 (L) 4.50 - 5.90 x10*6/uL    Hemoglobin 10.5 (L) 13.5 - 17.5 g/dL    Hematocrit 35.0 (L) 41.0 - 52.0 %    MCV 97 80 - 100 fL    MCH 29.0 26.0 - 34.0 pg    MCHC 30.0 (L) 32.0 - 36.0 g/dL    RDW 13.9 11.5 - 14.5 %    Platelets 176 150 - 450 x10*3/uL    Neutrophils % 89.8 40.0 - 80.0 %    Immature Granulocytes %, Automated 0.6 0.0 - 0.9 %    Lymphocytes % 3.7 13.0 - 44.0 %    Monocytes % 5.7 2.0 - 10.0 %    Eosinophils % 0.1 0.0 - 6.0 %    Basophils % 0.1 0.0 - 2.0 %    Neutrophils Absolute 13.97 (H) 1.60 - 5.50 x10*3/uL    Immature Granulocytes Absolute, Automated 0.10 0.00 - 0.50 x10*3/uL    Lymphocytes Absolute 0.57 (L) 0.80 - 3.00 x10*3/uL    Monocytes Absolute 0.89 (H) 0.05 - 0.80 x10*3/uL    Eosinophils Absolute 0.01 0.00 - 0.40 x10*3/uL    Basophils Absolute 0.02 0.00 - 0.10 x10*3/uL   Troponin I, High Sensitivity   Result Value Ref Range    Troponin I, High Sensitivity 31 0 - 53 ng/L   Renal Function Panel   Result Value Ref Range     Glucose 132 (H) 74 - 99 mg/dL    Sodium 143 136 - 145 mmol/L    Potassium 3.3 (L) 3.5 - 5.3 mmol/L    Chloride 105 98 - 107 mmol/L    Bicarbonate 26 21 - 32 mmol/L    Anion Gap 15 10 - 20 mmol/L    Urea Nitrogen 27 (H) 6 - 23 mg/dL    Creatinine 1.16 0.50 - 1.30 mg/dL    eGFR 62 >60 mL/min/1.73m*2    Calcium 8.3 (L) 8.6 - 10.6 mg/dL    Phosphorus 2.3 (L) 2.5 - 4.9 mg/dL    Albumin 2.3 (L) 3.4 - 5.0 g/dL   Magnesium   Result Value Ref Range    Magnesium 1.84 1.60 - 2.40 mg/dL   Blood Gas Arterial Full Panel   Result Value Ref Range    POCT pH, Arterial 7.51 (H) 7.38 - 7.42 pH    POCT pCO2, Arterial 33 (L) 38 - 42 mm Hg    POCT pO2, Arterial 118 (H) 85 - 95 mm Hg    POCT SO2, Arterial 100 94 - 100 %    POCT Oxy Hemoglobin, Arterial 97.4 94.0 - 98.0 %    POCT Hematocrit Calculated, Arterial 32.0 (L) 41.0 - 52.0 %    POCT Sodium, Arterial 140 136 - 145 mmol/L    POCT Potassium, Arterial 3.0 (L) 3.5 - 5.3 mmol/L    POCT Chloride, Arterial 107 98 - 107 mmol/L    POCT Ionized Calcium, Arterial 1.14 1.10 - 1.33 mmol/L    POCT Glucose, Arterial 135 (H) 74 - 99 mg/dL    POCT Lactate, Arterial 1.7 0.4 - 2.0 mmol/L    POCT Base Excess, Arterial 3.4 (H) -2.0 - 3.0 mmol/L    POCT HCO3 Calculated, Arterial 26.3 (H) 22.0 - 26.0 mmol/L    POCT Hemoglobin, Arterial 10.8 (L) 13.5 - 17.5 g/dL    POCT Anion Gap, Arterial 10 10 - 25 mmo/L    Patient Temperature 37.0 degrees Celsius    FiO2 50 %   POCT GLUCOSE   Result Value Ref Range    POCT Glucose 123 (H) 74 - 99 mg/dL   POCT GLUCOSE   Result Value Ref Range    POCT Glucose 89 74 - 99 mg/dL   Blood Gas Arterial Full Panel   Result Value Ref Range    POCT pH, Arterial 7.51 (H) 7.38 - 7.42 pH    POCT pCO2, Arterial 31 (L) 38 - 42 mm Hg    POCT pO2, Arterial 122 (H) 85 - 95 mm Hg    POCT SO2, Arterial 100 94 - 100 %    POCT Oxy Hemoglobin, Arterial 97.7 94.0 - 98.0 %    POCT Hematocrit Calculated, Arterial 28.0 (L) 41.0 - 52.0 %    POCT Sodium, Arterial 140 136 - 145 mmol/L    POCT  Potassium, Arterial 3.9 3.5 - 5.3 mmol/L    POCT Chloride, Arterial 113 (H) 98 - 107 mmol/L    POCT Ionized Calcium, Arterial 1.07 (L) 1.10 - 1.33 mmol/L    POCT Glucose, Arterial 92 74 - 99 mg/dL    POCT Lactate, Arterial 1.4 0.4 - 2.0 mmol/L    POCT Base Excess, Arterial 1.9 -2.0 - 3.0 mmol/L    POCT HCO3 Calculated, Arterial 24.7 22.0 - 26.0 mmol/L    POCT Hemoglobin, Arterial 9.3 (L) 13.5 - 17.5 g/dL    POCT Anion Gap, Arterial 6 (L) 10 - 25 mmo/L    Patient Temperature 37.0 degrees Celsius    FiO2 50 %   Calcium, ionized   Result Value Ref Range    POCT Calcium, Ionized 1.08 (L) 1.1 - 1.33 mmol/L   POCT GLUCOSE   Result Value Ref Range    POCT Glucose 118 (H) 74 - 99 mg/dL   Blood Gas Venous Full Panel   Result Value Ref Range    POCT pH, Venous 7.42 7.33 - 7.43 pH    POCT pCO2, Venous 39 (L) 41 - 51 mm Hg    POCT pO2, Venous 94 (H) 35 - 45 mm Hg    POCT SO2, Venous 99 (H) 45 - 75 %    POCT Oxy Hemoglobin, Venous 96.6 (H) 45.0 - 75.0 %    POCT Hematocrit Calculated, Venous 36.0 (L) 41.0 - 52.0 %    POCT Sodium, Venous 139 136 - 145 mmol/L    POCT Potassium, Venous 3.3 (L) 3.5 - 5.3 mmol/L    POCT Chloride, Venous 107 98 - 107 mmol/L    POCT Ionized Calicum, Venous 1.08 (L) 1.10 - 1.33 mmol/L    POCT Glucose, Venous 148 (H) 74 - 99 mg/dL    POCT Lactate, Venous 1.1 0.4 - 2.0 mmol/L    POCT Base Excess, Venous 0.8 -2.0 - 3.0 mmol/L    POCT HCO3 Calculated, Venous 25.3 22.0 - 26.0 mmol/L    POCT Hemoglobin, Venous 12.0 (L) 13.5 - 17.5 g/dL    POCT Anion Gap, Venous 10.0 10.0 - 25.0 mmol/L    Patient Temperature 37.0 degrees Celsius    FiO2 40 %     Imaging:   CT CHEST WO IV CONTRAST;  12/6/2023 6:27 am   IMPRESSION:  1.  Similar appearance of the chest compared to prior examination  with extensive bilateral pneumonia superimposed on severe emphysema.  2. Visualized nodular opacities are likely infectious. However,  recommend post treatment follow-up CT in 1-2 months to rule out mass.  3. Slight interval  improvement in pneumomediastinum. Mediastinal air  adjacent to the esophagus and a fluid-filled esophagus. Correlate  with concern for esophageal pathology.  4. Mediastinal lymphadenopathy is likely reactive.      XR CHEST 1 VIEW;  12/9/2023 9:04 am   IMPRESSION:  1.  Left-greater-than-right basilar opacities, with improvement noted  in the left lower lung opacities.  2. Stable small left and probable trace right pleural effusions.  3. Medical devices as above.        This patient has a urinary catheter   Reason for the urinary catheter remaining today? critically ill patient who need accurate urinary output measurements     Assessment/Plan   Principal Problem:    Acute hypoxemic respiratory failure (CMS/HCC)    Mr. Robb is an 83 y.o. male presenting with AHRF and septic shock 2/2 gram positive HAP vs aspiration pneumonia. Transferred from [place] to  on 12/5 for ENT evaluation to r/o necrotizing fascitis in the setting of subcutaneous emphysema in right supraclavicular region. CT chest notable for numerous b/l opacities suggestive of recurrent aspiration.     Per ENT eval, low concern for necrotizing infection. Patient initially improving with antibiotics but had worsening O2 requirement 12/6 with significant secretions/gurgling and minimal cough reflex. Underwent scope with ENT on 12/8 with no evidence of structural lesions.     Re-intubated overnight, now with increasing pressor requirement. Mild interval increase in WBC count from 13 to 15 overnight. Suspect some degree of sedation-induced hypotension and/or volume depletion (overall appears volume-down on exam). However, cannot rule-out new/worsening infection.     Conversation held with patient's son and daughter-in-law at bedside after rounds (see goals of care note). Family has decided to change code status to DNR to better reflect the patient's wishes, considering possible comfort care transition later this afternoon.    Updates 12/6:   -Will give 500  mL bolus now   -Will repeat blood cultures and re-start vancomycin given new pressor requirement  -Start PPI for GI ppx in setting of re-intubation  -Will hold Tfs for time being, as family considering possible transition to comfort care/terminal extubation later this afternoon    NEURO  #Sedation  -On propofol 15 mcg/kg/min and fentanyl 50 mcg/hr  -Weaning as tolerated     PULM  #Acute on chronic mixed resp failure in setting of suspected recurrent aspiration pneumonia  #Hospital acquired vs aspiration pneumonia  #Suspected COPD/emphysema - 2L baseline  -Intubated 12/4-12/6, re-intubated 12/8  -Antibiotic management as per ID section    #RLL spiculated mass, 14 x 9 mm   -possibly cancerous, could explain weight loss  -F/u CT in 1-2 months     CV  #Septic shock  -formal TTE 12/5: EF 60%, IVC dilated  -Pressors restarted overnight    #Hx of hypertension  -holding home metoprolol / HCTZ     ENT/Thoracic  #Subcutaneous emphysema near/in the danger space  #Mediastinal LAD  -CT chest read: significant soft tissue gas at the base of the neck predominantly to the right extending in the right supraclavicular region and axilla but not into the mediastinum  -CT neck: Subcutaneous emphysema extends from the right of the supraclavicular region and axilla anteriorly and medially to the retropharyngeal or danger space  -repeat CT neck / chest - no significant changes  -Thoracic surg - no interventions at this time  -ENT scoped pt through nares - no acute findings and signing off     FEN/GI  #Malnutrition  #Diet  -Nutrition following  -Will maintain NPO for time being, pending possible terminal extubation this afternoon    RENAL/  #CHARY - improving  -bl 1.0, peak 1.7, 1.4 on [facility]  -suspect septic shock  -fowler cath in place     #Staghorn calculus on L, mild hydronephrosis  -Initial urine cultures growing mixed shanel, probable contaminant   -Repeat UCx  12/6- neg      HEME/ONC  #Acute anemia  -baseline ~13, 10.5 this  AM  -low likelihood for hemolysis: LDH barely elevated, no abnormalities reported on smear/diff  -elevated INR may be nutritional deficiency, no hx of liver disease  -most likely due to septic shock. Will continue to monitor     #Elevated gamma gap  -SPEP - elevated m-protein / gamma band - recommend repeat testing after resolution of illness  -UPEP - elevated m-protein, free light chains 9% of total protein - recommend evaluation of plasma cell dyscrasia      ID  #Septic shock  #HAP  -source(s): PNA, UTI  -UCX mixed probable contaminant   -MRSA nares PCR neg, Urine strep and legionella neg, procal 0.8  -Repeat blood cultures and re-broadening to vanc/zosyn given new pressor requirement overnight    MSK/Skin  #Pressure wounds   -Wound care following    ENDO  #Hypoglycemia  -Started on D10W drip, BG trending in 80s-90s     FEN: NPO  A: left arterial line, LIJ CVC  O2: VC/AC 40%, PEEP 5, Vt 440  Drips: D10W  Abx: Vanc (12/9-), Zosyn (12/5-); prior: Rocephin/Azithro (12/4), Cefepime, Unasyn, Levaquin, Flagyl (12/5), vanc, clinda (12/5-12/7)  DVT PPx: SQH  GI PPx: esomeprazole     CODE STATUS: DNR/DNI  SURROGATE DECISION MAKER: [place] ([place]) - [phone]               Rianna Bro MD

## 2023-12-09 NOTE — CARE PLAN
PT in bilateral soft wrist restraints to maintain patient safety and protect medical devices. Will continue to monitor and reassess the need for restraints. See restraint flow sheet for further documentation.      Fall prevention continued, bed wheels locked, side rails up x3, bed in lowest position, bed alarm on, room door open and lights on for ease of visibility.    Skin care plan continued, site care performed, minimal linens placed underneath and PT turned every two hours and PT checked for incontinence.

## 2023-12-09 NOTE — CARE PLAN
The patient's goals for the shift include velasquez    The clinical goals for the shift include maintain MAP greater than 65      Problem: Pain - Adult  Goal: Verbalizes/displays adequate comfort level or baseline comfort level  Outcome: Progressing     Problem: Safety - Adult  Goal: Free from fall injury  Outcome: Progressing     Problem: Discharge Planning  Goal: Discharge to home or other facility with appropriate resources  Outcome: Progressing     Problem: Chronic Conditions and Co-morbidities  Goal: Patient's chronic conditions and co-morbidity symptoms are monitored and maintained or improved  Outcome: Progressing

## 2023-12-10 NOTE — PROGRESS NOTES
"Daniel Robb is a 83 y.o. male on day 5 of admission presenting with Acute hypoxemic respiratory failure (CMS/HCC).    Subjective   Patient seen and evaluated at bedside. No active complaints.       Objective     Physical Exam    Lethargic  Chest decreased air entry  Cardiac normal s1 and s2  Abdomen soft  No LE edema      Last Recorded Vitals  Blood pressure 113/72, pulse 94, temperature 36.7 °C (98.1 °F), resp. rate 16, height 1.88 m (6' 2.02\"), weight 62.8 kg (138 lb 7.2 oz), SpO2 96 %.  Intake/Output last 3 Shifts:  I/O last 3 completed shifts:  In: 3884.6 (61.9 mL/kg) [I.V.:827.6 (13.2 mL/kg); IV Piggyback:3057]  Out: 1075 (17.1 mL/kg) [Urine:1000 (0.4 mL/kg/hr); Stool:75]  Weight: 62.8 kg     Relevant Results    Results for orders placed or performed during the hospital encounter of 12/05/23 (from the past 24 hour(s))   POCT GLUCOSE   Result Value Ref Range    POCT Glucose 118 (H) 74 - 99 mg/dL   Blood Gas Venous Full Panel   Result Value Ref Range    POCT pH, Venous 7.42 7.33 - 7.43 pH    POCT pCO2, Venous 39 (L) 41 - 51 mm Hg    POCT pO2, Venous 94 (H) 35 - 45 mm Hg    POCT SO2, Venous 99 (H) 45 - 75 %    POCT Oxy Hemoglobin, Venous 96.6 (H) 45.0 - 75.0 %    POCT Hematocrit Calculated, Venous 36.0 (L) 41.0 - 52.0 %    POCT Sodium, Venous 139 136 - 145 mmol/L    POCT Potassium, Venous 3.3 (L) 3.5 - 5.3 mmol/L    POCT Chloride, Venous 107 98 - 107 mmol/L    POCT Ionized Calicum, Venous 1.08 (L) 1.10 - 1.33 mmol/L    POCT Glucose, Venous 148 (H) 74 - 99 mg/dL    POCT Lactate, Venous 1.1 0.4 - 2.0 mmol/L    POCT Base Excess, Venous 0.8 -2.0 - 3.0 mmol/L    POCT HCO3 Calculated, Venous 25.3 22.0 - 26.0 mmol/L    POCT Hemoglobin, Venous 12.0 (L) 13.5 - 17.5 g/dL    POCT Anion Gap, Venous 10.0 10.0 - 25.0 mmol/L    Patient Temperature 37.0 degrees Celsius    FiO2 40 %   POCT GLUCOSE   Result Value Ref Range    POCT Glucose 147 (H) 74 - 99 mg/dL     Scheduled medications     Continuous medications     PRN " "medications  PRN medications: glycopyrrolate, hyoscyamine, LORazepam, morphine, morphine, OLANZapine, oxygen, oxygen, polyethylene glycol                 Assessment/Plan   Principal Problem:    Acute hypoxemic respiratory failure (CMS/HCC)    Summary of stay per ICU provider \"Mr. Robb is an 83 y.o. male presenting with AHRF and septic shock 2/2 gram positive HAP vs aspiration pneumonia. Transferred from [place] to  on 12/5 for ENT evaluation to r/o necrotizing fascitis in the setting of subcutaneous emphysema in right supraclavicular region. CT chest notable for numerous b/l opacities suggestive of recurrent aspiration.      Per ENT eval, low concern for necrotizing infection. Patient initially improving with antibiotics but had worsening O2 requirement 12/6 with significant secretions/gurgling and minimal cough reflex. Underwent scope with ENT on 12/8 with no evidence of structural lesions.      Re-intubated Friday night, now with increasing pressor requirement. Mild interval increase in WBC count from 13 to 15 overnight. Suspect some degree of sedation-induced hypotension and/or volume depletion (overall appears volume-down on exam). However, cannot rule-out new/worsening infection.      Conversation held with patient's son and daughter-in-law at bedside after rounds (see goals of care note). Family has decided to change code status to DNR. Patient was extubated this afternoon and is now comfort care only. \"      Comfort measures     Patient was requesting a meal this am. Considering he was placed on comfort measure diet advanced to soft. Continue PRN management of pain and symptoms.   Consulted SW for hospice        I spent 35 minutes in the professional and overall care of this patient.      Sharmaine Castillo MD      "

## 2023-12-10 NOTE — CONSULTS
Vancomycin Dosing by Pharmacy- Cessation of Therapy    Consult to pharmacy for vancomycin dosing has been discontinued by the prescriber, pharmacy will sign off at this time.    Please call pharmacy if there are further questions or re-enter a consult if vancomycin is resumed.     Samuel Peguero, NathanD

## 2023-12-10 NOTE — SIGNIFICANT EVENT
ICU to [facility] Transfer Summary     I:  ICU Admission Reason & Brief ICU Course:    Mr. Robb is an 83 y.o. male presenting with AHRF and septic shock 2/2 gram positive HAP vs aspiration pneumonia. Transferred from [place] to  on 12/5 for ENT evaluation to r/o necrotizing fascitis in the setting of subcutaneous emphysema in right supraclavicular region. CT chest notable for numerous b/l opacities suggestive of recurrent aspiration.      Per ENT eval, low concern for necrotizing infection. Patient initially improving with antibiotics but had worsening O2 requirement 12/6 with significant secretions/gurgling and minimal cough reflex. Underwent scope with ENT on 12/8 with no evidence of structural lesions.      Re-intubated Friday night, now with increasing pressor requirement. Mild interval increase in WBC count from 13 to 15 overnight. Suspect some degree of sedation-induced hypotension and/or volume depletion (overall appears volume-down on exam). However, cannot rule-out new/worsening infection.      Conversation held with patient's son and daughter-in-law at bedside after rounds (see goals of care note). Family has decided to change code status to DNR. Patient was extubated this afternoon and is now comfort care only.         C: Code Status/DPOA Info/Goals of Care/ACP Note    DNR Comfort Measures Only  DPOA/Contact Number: [place] (son) - [phone]     U: Unprescribing & Pertinent High-Risk Medications    Changes to home meds: switched goals to comfort, all home meds held unless for comfort     Anticoagulation: No Reason for no VTE prophylaxis:procedure not indicated    Antibiotics:   [x] N/A - no current planned antimicrobioals      P: Pending Tests at the Time of Transfer   None      A: Active consultants, including Rehab:   []  Subspecialty Consultants:  none    U: Uncertainty Measure/Diagnostic Pause:    Working diagnosis at the time of transfer septic shock.     Diagnosis Degree of Certainty: 1. High  degree of certainty about the clinical diagnosis.     S: Summary of Major Problems and To-Dos:   #Comfort care  -continue protocol  -discuss hospice options in AM with family and SW      E: Exam, including Lines/Drains/Airways & Data Review:   Gen: resting comfortably  Head and neck: NCAT  HEENT: MMM, normal nose without congestion  CV: regular rate  Pulm: normal WOB   Ext: no LE edema, thin  Neuro: asleep but arousable, normal tone, face symmetric, moves all extremities spontaneously    Difficult airway? N/A    Within 30 minutes of the patient physically leaving the floor, a Floor Readiness Note needs to be placed with updated vitals.

## 2023-12-10 NOTE — NURSING NOTE
Pt comfort care on 2L NC. Full head to toe assessment deferred at this time. Pt is able to answer orientation questions appropriately but is forgetful at times. Pt denies pain and is resting comfortably. No family at bedside. Possible floor transfer tonight.

## 2023-12-11 NOTE — CARE PLAN
The patient's goals for the shift include velasquez    The clinical goals for the shift include Pt will remain safe and free of injury during night.    Pt remained safe and free of injury during night. Tarchy and repiratory distress at times with rhonchi. Oxygen adjusted accordingly and robinul administered. Right hip pain reported and morphine administered. Son updated on patient condition. Call light in reach. Sleeping quietly at this time.     Kalpana Leonard RN

## 2023-12-11 NOTE — PROGRESS NOTES
Wound Care Progress Note     Visit Date: 2023      Patient Name: Daniel Robb         MRN: [id number]                Reason for Visit: Stoma assessment and ostomy pouch change         Wound History: s/p colostomy     Ostomy Assessment:       Colostomy LLQ (Active)   Earliest Known Present: 23   Location: LLQ   Number of days: 6     Colostomy LLQ (Active)   Stomal Appliance 2 piece;Changed 23 1400   Site/Stoma Assessment Clean;Intact;Red 23 1400   Peristomal Assessment Clean;Intact 23 1400   Treatment Pouch change;Site care 23 1400   Drainage Characteristics Brown 23 1700   Output (mL) 0 mL 12/10/23 0000     Ostomy type: colostomy       size: 3/4 inch      color: red and moist      protruding: budded  Jamal: none  Functioning: loose brown stool  Mucocutaneous junction: intact   Peristomal skin: clean and intact   Pouchin piece [place] RED flat wafer and lock n roll pouch   Ostomy Education: Patient was very sleeping during the ostomy pouch change and didn't participate.  Plan: assess stoma/pouching      Wound Team Plan: See the patient twice weekly for pouch changes      Saúl Rosado RN-BC, CWON  2023  6:38 PM

## 2023-12-12 NOTE — NURSING NOTE
Pt stated yelling help , comfort care, dr squires notified , pt desating on oxygen , increased to 5 l still desating in 60's, told to give ativan, pt complained of pain asked for morphine refused at first, Dr Squires wanted him to have IM zyrexa.  Berta Garg RN    Update  assessed, soon after 1609 pt passed, dr notified  Berta Garg RN

## 2023-12-12 NOTE — DISCHARGE SUMMARY
Discharge Diagnosis  Acute hypoxemic respiratory failure (CMS/HCC)  Septic Shock   Aspiration   Acute heart failure   Hospice care     Hospital Course  Mr. Robb is an 83 y.o. male presenting with AHRF and septic shock 2/2 gram positive HAP and aspiration pneumonia. Transferred from [place] to  on 12/5 for ENT evaluation to r/o necrotizing fascitis in the setting of subcutaneous emphysema in right supraclavicular region. CT chest notable for numerous b/l opacities suggestive of recurrent aspiration.      Per ENT eval, low concern for necrotizing infection. Patient initially improving with antibiotics but had worsening O2 requirement 12/6 with significant secretions/gurgling and minimal cough reflex. Underwent scope with ENT on 12/8 with no evidence of structural lesions.      Re-intubated Friday night, now with increasing pressor requirement. Mild interval increase in WBC count from 13 to 15 overnight. Conversation held with patient's son and daughter-in-law at bedside after rounds and due to poor quality of life and concerns for aspiration the patient was transition to DNR comfort care.  Family was supposed to meet with hospice tomorrow but patient oxygen kept on desaturating and he was getting agitated and delirious.  Patient was given sublingual ativan 0.5 mg with no improvement. Zyprexa was ordered.  Patient continued to desaturate and nurse was instructed to place him in venti mask but patient continued to desaturate and passed away. Family was informed     Pertinent Physical Exam At Time of Discharge  Patient not responsive to stimuli   Pupils fixed and dilated. Absent corneal and gag reflexes   Absent cardiopulmonary sounds, auscultated for > 60 seconds   Absent pulses, Palpated for > 60 seconds     Home Medications     Medication List      ASK your doctor about these medications     acetaminophen 325 mg tablet; Commonly known as: Tylenol   guaiFENesin 100 mg/5 mL syrup; Commonly known as: Robitussin    hydroCHLOROthiazide 25 mg tablet; Commonly known as: HYDRODiuril   ipratropium 0.02 % nebulizer solution; Commonly known as: Atrovent   lovastatin 40 mg tablet; Commonly known as: Mevacor   * metoprolol tartrate 100 mg tablet; Commonly known as: Lopressor; Ask   about: Which instructions should I use?   * metoprolol tartrate 100 mg tablet; Commonly known as: Lopressor; Ask   about: Which instructions should I use?   oxygen DME/Hospice therapy; Commonly known as: O2   traMADol 50 mg tablet; Commonly known as: Ultram  * This list has 2 medication(s) that are the same as other medications   prescribed for you. Read the directions carefully, and ask your doctor or   other care provider to review them with you.       Outpatient Follow-Up  No future appointments.    30 minutes spent coordinating the discharge of the patient     Katerine Watson, DO

## 2023-12-12 NOTE — PROGRESS NOTES
Physical Therapy                 Therapy Communication Note    Patient Name: Daniel Robb  MRN: [id number]  Today's Date: 12/12/2023     Discipline: Physical Therapy    Missed Visit Reason: Missed Visit Reason:  (Pt is now on comfort care.  PT will sign off but will remain available for reconsult should there be any changes.)    Missed Time: Attempt    Comment:

## 2023-12-12 NOTE — SIGNIFICANT EVENT
Called by RN as patient had no vitals sign. Patient was DNAR CC    Patient not responsive to stimuli   Pupils fixed and dilated. Absent corneal and gag reflexes   Absent cardiopulmonary sounds, auscultated for > 60 seconds   Absent pulses, Palpated for > 60 seconds       <<Son was contacted>>

## 2023-12-12 NOTE — CARE PLAN
The patient's goals for the shift include velasquez    The clinical goals for the shift include Pt will remain safe and free of injury during night.    Pt remained safe and free of injury during night. Couple of times respiratory distress noted and oxygen adjusted accordingly. Robinul and morphine administered as requested. Call light in reach.     Kalpana Leonard RN

## 2023-12-12 NOTE — PROGRESS NOTES
"Daniel Robb is a 83 y.o. male on day 6 of admission presenting with Acute hypoxemic respiratory failure (CMS/HCC).    Subjective   Patient was seen and examined at bedside this morning was barely verbal, with inaudible sentences, not in distress, and no overnight events reported by nursing staff.       Objective     Physical Exam  Constitutional: Elderly gentleman, barely verbal, alert active, cooperative not in acute distress  Eyes: PERRLA, clear sclera  ENMT: Moist mucosal membranes, no exudate  Head / Neck: Atraumatic, normocephalic, supple neck, JVP not visualized.  Left IJ triple-lumen in place  Lungs: Patent airways, CTABL  Heart: RRR, S1S2, no murmurs appreciated, palpable pulses in all extremities  GI: Soft, NT, ND, bowel sounds present in all quadrants  MSK: Unable to assess range of motion, cannot follow command  Extremities: No peripheral edema  : Peralta catheter inserted  Breast: Deferred  Neurological: AAO x 1 to person, facial muscles symmetrical, sensation intact, incomplete neuroexam to inability to follow command  Psychological: Appropriate mood and behavior    Last Recorded Vitals  Blood pressure 117/65, pulse 93, temperature 36.5 °C (97.7 °F), resp. rate 18, height 1.88 m (6' 2.02\"), weight 62.8 kg (138 lb 7.2 oz), SpO2 97 %.  Intake/Output last 3 Shifts:  I/O last 3 completed shifts:  In: - (0 mL/kg)   Out: 800 (12.7 mL/kg) [Urine:700 (0.3 mL/kg/hr); Stool:100]  Weight: 62.8 kg     Relevant Results              This patient has a central line   Reason for the central line remaining today? Parenteral medication    This patient has a urinary catheter   Reason for the urinary catheter remaining today? urinary retention/bladder outlet obstruction, acute or chronic         Assessment/Plan   Principal Problem:    Acute hypoxemic respiratory failure (CMS/HCC)    83 y.o. male with HTN, COPD (2L baseline), colon cancer s/p partial colectomy with end colostomy, PVD who presents as a transfer from " "[place] ED with AHRF requiring intubation. Transfer initiated for CT findings concerning for upper chest/neck necrotizing process requiring urgent ENT/thoracic surgery evaluation.     Summary of stay per ICU provider \"Mr. Robb is an 83 y.o. male presenting with AHRF and septic shock 2/2 gram positive HAP vs aspiration pneumonia. Transferred from [place] to  on 12/5 for ENT evaluation to r/o necrotizing fascitis in the setting of subcutaneous emphysema in right supraclavicular region. CT chest notable for numerous b/l opacities suggestive of recurrent aspiration.      -Per ENT eval, low concern for necrotizing infection. Patient initially improving with antibiotics but had worsening O2 requirement 12/6 with significant secretions/gurgling and minimal cough reflex. Underwent scope with ENT on 12/8 with no evidence of structural lesions.      - Re-intubated Friday night, now with increasing pressor requirement. Mild interval increase in WBC count from 13 to 15 overnight. Suspect some degree of sedation-induced hypotension and/or volume depletion (overall appears volume-down on exam). However, cannot rule-out new/worsening infection.   Currently on 4 L O2 via nasal cannula with saturation of 94%     - Conversation reportedly held with patient's son and daughter-in-law at bedside after rounds on December 10, 2023 (see goals of care note). Family has decided to change code status to DNR. Patient was extubated this afternoon and is now comfort care only. \"     Comfort measures   Continue PRN management of pain and symptoms.   Consulted SW for hospice: Assessment pending         I spent 30 minutes in the professional and overall care of this patient.      Stef Villatoro, DO      "

## 2023-12-12 NOTE — PROGRESS NOTES
Daniel Robb is a 83 y.o. male on day 7 of admission presenting with Acute hypoxemic respiratory failure (CMS/HCC).    SW spoke with patient's son [place] Robb [phone] in reference to patient being referred to hospice.  Son agreed to have [facility] contact him and discuss possible services.  Referral placed in [facility].  SW will continue to follow up and assist with dispo planning and additional referrals as needed.      DELL ERNST LCSW

## 2023-12-13 LAB
ATRIAL RATE: 101 BPM
ATRIAL RATE: 127 BPM
ATRIAL RATE: 51 BPM
BACTERIA BLD CULT: NORMAL
BACTERIA BLD CULT: NORMAL
HOLD SPECIMEN: NORMAL
HOLD SPECIMEN: NORMAL
P AXIS: 60 DEGREES
P AXIS: 66 DEGREES
P AXIS: 79 DEGREES
P OFFSET: 164 MS
P OFFSET: 168 MS
P OFFSET: 193 MS
P ONSET: 130 MS
P ONSET: 133 MS
P ONSET: 139 MS
PR INTERVAL: 144 MS
PR INTERVAL: 152 MS
PR INTERVAL: 176 MS
Q ONSET: 205 MS
Q ONSET: 206 MS
Q ONSET: 227 MS
QRS COUNT: 17 BEATS
QRS COUNT: 20 BEATS
QRS COUNT: 9 BEATS
QRS DURATION: 102 MS
QRS DURATION: 106 MS
QRS DURATION: 132 MS
QT INTERVAL: 316 MS
QT INTERVAL: 352 MS
QT INTERVAL: 540 MS
QTC CALCULATION(BAZETT): 450 MS
QTC CALCULATION(BAZETT): 456 MS
QTC CALCULATION(BAZETT): 497 MS
QTC FREDERICIA: 400 MS
QTC FREDERICIA: 418 MS
QTC FREDERICIA: 512 MS
R AXIS: 178 DEGREES
R AXIS: 25 DEGREES
R AXIS: 71 DEGREES
T AXIS: 11 DEGREES
T AXIS: 18 DEGREES
T AXIS: 60 DEGREES
T OFFSET: 364 MS
T OFFSET: 381 MS
T OFFSET: 497 MS
VENTRICULAR RATE: 101 BPM
VENTRICULAR RATE: 122 BPM
VENTRICULAR RATE: 51 BPM

## 2023-12-18 LAB
ATRIAL RATE: 50 BPM
P AXIS: 85 DEGREES
P OFFSET: 177 MS
P ONSET: 132 MS
PR INTERVAL: 192 MS
Q ONSET: 228 MS
QRS COUNT: 8 BEATS
QRS DURATION: 120 MS
QT INTERVAL: 534 MS
QTC CALCULATION(BAZETT): 486 MS
QTC FREDERICIA: 502 MS
R AXIS: 68 DEGREES
T AXIS: 60 DEGREES
T OFFSET: 495 MS
VENTRICULAR RATE: 50 BPM